# Patient Record
Sex: FEMALE | Race: BLACK OR AFRICAN AMERICAN | Employment: FULL TIME | URBAN - METROPOLITAN AREA
[De-identification: names, ages, dates, MRNs, and addresses within clinical notes are randomized per-mention and may not be internally consistent; named-entity substitution may affect disease eponyms.]

---

## 2017-06-19 ENCOUNTER — OFFICE VISIT (OUTPATIENT)
Dept: FAMILY MEDICINE CLINIC | Facility: CLINIC | Age: 42
End: 2017-06-19

## 2017-06-19 VITALS
HEART RATE: 68 BPM | HEIGHT: 65.5 IN | BODY MASS INDEX: 31.67 KG/M2 | SYSTOLIC BLOOD PRESSURE: 120 MMHG | DIASTOLIC BLOOD PRESSURE: 72 MMHG | WEIGHT: 192.38 LBS | TEMPERATURE: 98 F | RESPIRATION RATE: 12 BRPM

## 2017-06-19 DIAGNOSIS — Z13.1 SCREENING FOR DIABETES MELLITUS: ICD-10-CM

## 2017-06-19 DIAGNOSIS — Z12.31 ENCOUNTER FOR SCREENING MAMMOGRAM FOR BREAST CANCER: ICD-10-CM

## 2017-06-19 DIAGNOSIS — Z13.29 SCREENING FOR THYROID DISORDER: ICD-10-CM

## 2017-06-19 DIAGNOSIS — Z01.419 WELL WOMAN EXAM: Primary | ICD-10-CM

## 2017-06-19 DIAGNOSIS — Z13.220 SCREENING, LIPID: ICD-10-CM

## 2017-06-19 DIAGNOSIS — N83.201 RIGHT OVARIAN CYST: ICD-10-CM

## 2017-06-19 DIAGNOSIS — Z00.00 BLOOD TESTS FOR ROUTINE GENERAL PHYSICAL EXAMINATION: ICD-10-CM

## 2017-06-19 DIAGNOSIS — Z13.0 SCREENING, ANEMIA, DEFICIENCY, IRON: ICD-10-CM

## 2017-06-19 PROCEDURE — 90471 IMMUNIZATION ADMIN: CPT | Performed by: FAMILY MEDICINE

## 2017-06-19 PROCEDURE — 99396 PREV VISIT EST AGE 40-64: CPT | Performed by: FAMILY MEDICINE

## 2017-06-19 PROCEDURE — 90715 TDAP VACCINE 7 YRS/> IM: CPT | Performed by: FAMILY MEDICINE

## 2017-06-19 NOTE — PROGRESS NOTES
:HPI:   Isabelle Parsons is a 39year old female who presents for a complete physical exam.     No kidney stone pain. Avoiding hot foods. Watching diet-salads, grilled cheese. Splurges once a week.       Wt Readings from Last 3 Encounters:  06/19 Temp(Src) 97.7 °F (36.5 °C) (Oral)  Resp 12  Ht 65.5\"  Wt 192 lb 6.4 oz  BMI 31.52 kg/m2  Body mass index is 31.52 kg/(m^2). GENERAL: well developed, well nourished,in no apparent distress.   Mood, affect and behavior is normal.    SKIN: no rashes,no jose maria results found for: T4F      Lipids  (most recent labs)   No results found for: A1C       ASSESSMENT:     Well woman exam    PLAN:     Ifeoma Car was seen today for physical.    Diagnoses and all orders for this visit:    Well woman exam    Encounter for scre

## 2017-06-26 ENCOUNTER — LAB ENCOUNTER (OUTPATIENT)
Dept: LAB | Facility: HOSPITAL | Age: 42
End: 2017-06-26
Attending: FAMILY MEDICINE
Payer: COMMERCIAL

## 2017-06-26 ENCOUNTER — HOSPITAL ENCOUNTER (OUTPATIENT)
Dept: ULTRASOUND IMAGING | Facility: HOSPITAL | Age: 42
Discharge: HOME | End: 2017-06-26
Attending: FAMILY MEDICINE
Payer: COMMERCIAL

## 2017-06-26 DIAGNOSIS — Z00.00 BLOOD TESTS FOR ROUTINE GENERAL PHYSICAL EXAMINATION: ICD-10-CM

## 2017-06-26 DIAGNOSIS — Z13.29 SCREENING FOR THYROID DISORDER: ICD-10-CM

## 2017-06-26 DIAGNOSIS — N83.201 RIGHT OVARIAN CYST: ICD-10-CM

## 2017-06-26 DIAGNOSIS — Z13.0 SCREENING, ANEMIA, DEFICIENCY, IRON: ICD-10-CM

## 2017-06-26 DIAGNOSIS — Z13.1 SCREENING FOR DIABETES MELLITUS: ICD-10-CM

## 2017-06-26 PROCEDURE — 76856 US EXAM PELVIC COMPLETE: CPT | Performed by: FAMILY MEDICINE

## 2017-06-26 PROCEDURE — 76830 TRANSVAGINAL US NON-OB: CPT | Performed by: FAMILY MEDICINE

## 2017-06-26 PROCEDURE — 80053 COMPREHEN METABOLIC PANEL: CPT

## 2017-06-26 PROCEDURE — 84443 ASSAY THYROID STIM HORMONE: CPT

## 2017-06-26 PROCEDURE — 85025 COMPLETE CBC W/AUTO DIFF WBC: CPT

## 2017-06-26 PROCEDURE — 36415 COLL VENOUS BLD VENIPUNCTURE: CPT

## 2017-06-27 NOTE — PROGRESS NOTES
Discussed US of pelvis results with Coleman by phone, telling her the results were normal per [facility] [place]. Coleman verbalizes understanding.

## 2017-06-27 NOTE — PROGRESS NOTES
Discussed test results with Lester Mantilla by phone, telling her the labs are normal per [facility]. Patient verbalizes understanding.

## 2017-11-15 ENCOUNTER — OFFICE VISIT (OUTPATIENT)
Dept: FAMILY MEDICINE CLINIC | Facility: CLINIC | Age: 42
End: 2017-11-15

## 2017-11-15 VITALS
HEART RATE: 66 BPM | BODY MASS INDEX: 32.51 KG/M2 | WEIGHT: 197.5 LBS | TEMPERATURE: 98 F | SYSTOLIC BLOOD PRESSURE: 110 MMHG | RESPIRATION RATE: 18 BRPM | DIASTOLIC BLOOD PRESSURE: 72 MMHG | HEIGHT: 65.5 IN

## 2017-11-15 DIAGNOSIS — R14.0 SENSATION OF GASEOUS ABDOMINAL FULLNESS: Primary | ICD-10-CM

## 2017-11-15 DIAGNOSIS — Z23 NEED FOR VIRAL IMMUNIZATION: ICD-10-CM

## 2017-11-15 PROCEDURE — 99214 OFFICE O/P EST MOD 30 MIN: CPT | Performed by: FAMILY MEDICINE

## 2017-11-15 PROCEDURE — 90471 IMMUNIZATION ADMIN: CPT | Performed by: FAMILY MEDICINE

## 2017-11-15 PROCEDURE — 90686 IIV4 VACC NO PRSV 0.5 ML IM: CPT | Performed by: FAMILY MEDICINE

## 2017-11-15 NOTE — PATIENT INSTRUCTIONS
Take a daily probiotic (Diego's brand is fine!). Take calcium 1,000-1,200 daily. Take Benefiber once daily mixed in water.

## 2017-11-15 NOTE — PROGRESS NOTES
Facundo Whaley is a 43year old female. S:  Patient presents today with the following concerns:  · Eating healthy-raw veggies and smoothies.   Mixed greens, kale, spinach, onions, etc.  For 2 months feeling bloating, feels a \"movement\" in abd no edema  NEURO: Oriented times three,cranial nerves are intact,motor and sensory are grossly intact    ASSESSMENT AND PLAN:  Marshal Clifford is a 43year old female.   Sensation of gaseous abdominal fullness  (primary encounter diagnosis)  Need fo

## 2018-05-30 ENCOUNTER — OFFICE VISIT (OUTPATIENT)
Dept: FAMILY MEDICINE CLINIC | Facility: CLINIC | Age: 43
End: 2018-05-30

## 2018-05-30 VITALS
BODY MASS INDEX: 34.07 KG/M2 | DIASTOLIC BLOOD PRESSURE: 88 MMHG | WEIGHT: 207 LBS | SYSTOLIC BLOOD PRESSURE: 128 MMHG | HEIGHT: 65.5 IN | TEMPERATURE: 99 F | HEART RATE: 84 BPM | RESPIRATION RATE: 16 BRPM

## 2018-05-30 DIAGNOSIS — Z12.31 ENCOUNTER FOR SCREENING MAMMOGRAM FOR BREAST CANCER: ICD-10-CM

## 2018-05-30 DIAGNOSIS — Z00.00 LABORATORY EXAMINATION ORDERED AS PART OF A ROUTINE GENERAL MEDICAL EXAMINATION: ICD-10-CM

## 2018-05-30 DIAGNOSIS — Z13.21 ENCOUNTER FOR VITAMIN DEFICIENCY SCREENING: ICD-10-CM

## 2018-05-30 DIAGNOSIS — Z13.0 SCREENING, ANEMIA, DEFICIENCY, IRON: ICD-10-CM

## 2018-05-30 DIAGNOSIS — Z01.419 WELL WOMAN EXAM: Primary | ICD-10-CM

## 2018-05-30 PROCEDURE — 99396 PREV VISIT EST AGE 40-64: CPT | Performed by: FAMILY MEDICINE

## 2018-05-30 NOTE — PROGRESS NOTES
:HPI:   Nida Dumont is a 43year old female who presents for a complete physical exam.     Patient complains of weight gain. Wondering if related to IUD.     Wt Readings from Last 3 Encounters:  05/30/18 : 207 lb  11/15/17 : 197 lb 8 oz  06/19 Resp 16   Ht 65.5\"   Wt 207 lb   BMI 33.92 kg/m²   Body mass index is 33.92 kg/m². GENERAL: well developed, well nourished,in no apparent distress.   Mood, affect and behavior is normal.    SKIN: no rashes,no suspicious lesions  HEENT: atraumatic, norm (most recent labs)   No results found for: A1C       ASSESSMENT:     Well woman exam    PLAN:     Petra Bardales was seen today for physical and contraception.     Diagnoses and all orders for this visit:    Well woman exam    Encounter for screening mammogram fo

## 2018-06-26 ENCOUNTER — LAB ENCOUNTER (OUTPATIENT)
Dept: LAB | Age: 43
End: 2018-06-26
Attending: FAMILY MEDICINE
Payer: COMMERCIAL

## 2018-06-26 DIAGNOSIS — Z13.21 ENCOUNTER FOR VITAMIN DEFICIENCY SCREENING: ICD-10-CM

## 2018-06-26 DIAGNOSIS — Z00.00 LABORATORY EXAMINATION ORDERED AS PART OF A ROUTINE GENERAL MEDICAL EXAMINATION: ICD-10-CM

## 2018-06-26 DIAGNOSIS — Z13.0 SCREENING, ANEMIA, DEFICIENCY, IRON: ICD-10-CM

## 2018-06-26 PROCEDURE — 80061 LIPID PANEL: CPT

## 2018-06-26 PROCEDURE — 84443 ASSAY THYROID STIM HORMONE: CPT

## 2018-06-26 PROCEDURE — 80053 COMPREHEN METABOLIC PANEL: CPT

## 2018-06-26 PROCEDURE — 82306 VITAMIN D 25 HYDROXY: CPT

## 2018-06-26 PROCEDURE — 85025 COMPLETE CBC W/AUTO DIFF WBC: CPT

## 2018-06-27 DIAGNOSIS — R79.89 LOW VITAMIN D LEVEL: ICD-10-CM

## 2018-06-27 DIAGNOSIS — R79.89 ELEVATED SERUM CREATININE: ICD-10-CM

## 2018-06-27 DIAGNOSIS — D69.6 PLATELETS DECREASED (HCC): Primary | ICD-10-CM

## 2018-08-13 ENCOUNTER — OFFICE VISIT (OUTPATIENT)
Dept: FAMILY MEDICINE CLINIC | Facility: CLINIC | Age: 43
End: 2018-08-13

## 2018-08-13 VITALS
HEART RATE: 60 BPM | SYSTOLIC BLOOD PRESSURE: 118 MMHG | TEMPERATURE: 97 F | RESPIRATION RATE: 16 BRPM | DIASTOLIC BLOOD PRESSURE: 78 MMHG

## 2018-08-13 DIAGNOSIS — B37.3 YEAST VAGINITIS: ICD-10-CM

## 2018-08-13 DIAGNOSIS — M54.32 SCIATICA OF LEFT SIDE: Primary | ICD-10-CM

## 2018-08-13 PROCEDURE — 99213 OFFICE O/P EST LOW 20 MIN: CPT | Performed by: FAMILY MEDICINE

## 2018-08-13 RX ORDER — FLUCONAZOLE 150 MG/1
150 TABLET ORAL ONCE
Qty: 2 TABLET | Refills: 2 | Status: SHIPPED | OUTPATIENT
Start: 2018-08-13 | End: 2018-08-13

## 2018-08-13 RX ORDER — MELOXICAM 15 MG/1
15 TABLET ORAL DAILY
Qty: 30 TABLET | Refills: 0 | Status: SHIPPED | OUTPATIENT
Start: 2018-08-13 | End: 2019-06-11 | Stop reason: ALTCHOICE

## 2018-08-13 NOTE — PATIENT INSTRUCTIONS
Sciatica    Sciatica is a condition that causes pain in the lower back that spreads down into the buttock, hip, and leg. Sometimes the leg pain can happen without any back pain.  Sciatica happens when a spinal nerve is irritated or has pressure put on it · When in bed, try to find a position that is comfortable. A firm mattress is best. Try lying flat on your back with pillows under your knees. You can also try lying on your side with your knees bent up toward your chest and a pillow between your knees.   · © 9615-6872 [employer] [address]. [address] [facility], [facility] [place]. All rights reserved. This information is not intended as a substitute for professional medical care. Always follow your healthcare professional's instructions.

## 2018-08-13 NOTE — PROGRESS NOTES
Kaela Agustin is a 43year old female. S:  Patient presents today with the following concerns:  · Numbness/tingling from left hip to left knee-down side. No back pain. Uses hot compresses when gets very bad. Sits all day for job.   Standing intact    ASSESSMENT AND PLAN:  Liliane Gonzalez is a 43year old female. Sciatica of left side  (primary encounter diagnosis)  Yeast vaginitis    No orders of the defined types were placed in this encounter.     Meds & Refills for this Visit:  Edis Leblanc

## 2018-09-17 ENCOUNTER — OFFICE VISIT (OUTPATIENT)
Dept: FAMILY MEDICINE CLINIC | Facility: CLINIC | Age: 43
End: 2018-09-17

## 2018-09-17 VITALS
HEART RATE: 64 BPM | RESPIRATION RATE: 16 BRPM | HEIGHT: 65.75 IN | BODY MASS INDEX: 33.83 KG/M2 | WEIGHT: 208 LBS | SYSTOLIC BLOOD PRESSURE: 124 MMHG | DIASTOLIC BLOOD PRESSURE: 84 MMHG | TEMPERATURE: 98 F

## 2018-09-17 DIAGNOSIS — N76.0 BACTERIAL VAGINITIS: Primary | ICD-10-CM

## 2018-09-17 DIAGNOSIS — B96.89 BACTERIAL VAGINITIS: Primary | ICD-10-CM

## 2018-09-17 PROCEDURE — 99213 OFFICE O/P EST LOW 20 MIN: CPT | Performed by: FAMILY MEDICINE

## 2018-09-17 PROCEDURE — 87510 GARDNER VAG DNA DIR PROBE: CPT | Performed by: FAMILY MEDICINE

## 2018-09-17 PROCEDURE — 87660 TRICHOMONAS VAGIN DIR PROBE: CPT | Performed by: FAMILY MEDICINE

## 2018-09-17 PROCEDURE — 90471 IMMUNIZATION ADMIN: CPT | Performed by: FAMILY MEDICINE

## 2018-09-17 PROCEDURE — 87480 CANDIDA DNA DIR PROBE: CPT | Performed by: FAMILY MEDICINE

## 2018-09-17 PROCEDURE — 90686 IIV4 VACC NO PRSV 0.5 ML IM: CPT | Performed by: FAMILY MEDICINE

## 2018-09-17 RX ORDER — METRONIDAZOLE 500 MG/1
500 TABLET ORAL 2 TIMES DAILY
Qty: 14 TABLET | Refills: 0 | Status: SHIPPED | OUTPATIENT
Start: 2018-09-17 | End: 2019-06-11 | Stop reason: ALTCHOICE

## 2018-09-17 NOTE — PROGRESS NOTES
Hosea Redding is a 43year old female. S:  Patient presents today with the following concerns:  · Vaginal discharge. Metallic smell. Profuse discharge. Slight outer labial itching. Sometimes ache in the pubic bone. · No fevers, N/V/D. AND PLAN:  Vicky Cannon is a 43year old female.   Bacterial vaginitis  (primary encounter diagnosis)    Orders Placed This Encounter      Flulaval 6 months and older 0.5 ml Quad PF [02812]      Vaginitis/Vaginosis, Dna Probe    Meds & Refills f

## 2018-09-17 NOTE — PATIENT INSTRUCTIONS
Bacterial Vaginosis    You have a vaginal infection called bacterial vaginosis (BV). Both good and bad bacteria are present in a healthy vagina. BV occurs when these bacteria get out of balance. The number of bad bacteria increase.  And the number of good · You have a fever of 100.4ºF (38ºC) or higher, or as directed by your provider. · Your symptoms worsen, or they don’t go away within a few days of starting treatment. · You have new pain in the lower belly or pelvic region.   · You have side effects that · Avoid vaginal sprays, scented toilet paper and soaps, and deodorant tampons or pads, which can cause vaginal irritation  Staying healthy overall  Good overall health can help you resist infection.  To be healthier:  · Help protect yourself from STIs (sexu

## 2019-03-05 ENCOUNTER — HOSPITAL ENCOUNTER (OUTPATIENT)
Dept: MAMMOGRAPHY | Age: 44
Discharge: HOME | End: 2019-03-05
Attending: FAMILY MEDICINE
Payer: COMMERCIAL

## 2019-03-05 DIAGNOSIS — Z12.31 ENCOUNTER FOR SCREENING MAMMOGRAM FOR BREAST CANCER: ICD-10-CM

## 2019-03-05 PROCEDURE — 77067 SCR MAMMO BI INCL CAD: CPT | Performed by: FAMILY MEDICINE

## 2019-03-05 PROCEDURE — 77063 BREAST TOMOSYNTHESIS BI: CPT | Performed by: FAMILY MEDICINE

## 2019-03-12 ENCOUNTER — OFFICE VISIT (OUTPATIENT)
Dept: FAMILY MEDICINE CLINIC | Facility: CLINIC | Age: 44
End: 2019-03-12

## 2019-03-12 VITALS
WEIGHT: 204 LBS | DIASTOLIC BLOOD PRESSURE: 68 MMHG | BODY MASS INDEX: 33 KG/M2 | TEMPERATURE: 99 F | HEART RATE: 79 BPM | RESPIRATION RATE: 22 BRPM | SYSTOLIC BLOOD PRESSURE: 122 MMHG | OXYGEN SATURATION: 100 %

## 2019-03-12 DIAGNOSIS — B34.9 ACUTE VIRAL SYNDROME: Primary | ICD-10-CM

## 2019-03-12 PROCEDURE — 99213 OFFICE O/P EST LOW 20 MIN: CPT | Performed by: FAMILY MEDICINE

## 2019-03-12 NOTE — PROGRESS NOTES
CHIEF COMPLAINT: Patient presents with: Body ache and/or chills: x1 day  Fever  Cough        HPI:     Raegan Hickman is a 37year old female presents for body aches and cough x 2 days. Pt p/w a 2-day history of bodyaches and cough.  She starte fatigue. Negative for fever. HENT: Positive for congestion, rhinorrhea and sore throat. Negative for ear pain, postnasal drip and sinus pressure. Respiratory: Positive for cough. Negative for shortness of breath and wheezing.     Gastrointestinal: Nega Acute viral syndrome  Reassured pt this is likely a viral syndrome and will improve in the next 7-10 days. Influenza not suspected, given she has not had a fever.  Supportive care d/w pt: OTC analgesics/antipyretics prn, rest, fluids, OTC cold and flu meds

## 2019-03-12 NOTE — PATIENT INSTRUCTIONS
Viral Syndrome (Adult)  A viral illness may cause a number of symptoms such as fever. Other symptoms depend on the part of the body that the virus affects.  If it settles in your nose, throat, and lungs, it may cause cough, sore throat, congestion, runny · Your appetite may be poor, so a light diet is fine. Avoid dehydration by drinking 8 to 12, 8-ounce glasses of fluids each day.  This may include water; orange juice; lemonade; apple, grape, and cranberry juice; clear fruit drinks; electrolyte replacement © 9313-6595 [employer] [address]. 1407 [facility] [place] [place]. All rights reserved. This information is not intended as a substitute for professional medical care. Always follow your healthcare professional's instructions.

## 2019-06-11 ENCOUNTER — OFFICE VISIT (OUTPATIENT)
Dept: FAMILY MEDICINE CLINIC | Facility: CLINIC | Age: 44
End: 2019-06-11

## 2019-06-11 VITALS
TEMPERATURE: 97 F | WEIGHT: 211.13 LBS | SYSTOLIC BLOOD PRESSURE: 124 MMHG | BODY MASS INDEX: 34.75 KG/M2 | DIASTOLIC BLOOD PRESSURE: 68 MMHG | RESPIRATION RATE: 16 BRPM | HEIGHT: 65.5 IN | HEART RATE: 72 BPM

## 2019-06-11 DIAGNOSIS — Z00.00 WELL ADULT EXAM: Primary | ICD-10-CM

## 2019-06-11 DIAGNOSIS — Z00.00 BLOOD TESTS FOR ROUTINE GENERAL PHYSICAL EXAMINATION: ICD-10-CM

## 2019-06-11 DIAGNOSIS — Z12.4 SCREENING FOR CERVICAL CANCER: ICD-10-CM

## 2019-06-11 DIAGNOSIS — Z11.51 SCREENING FOR HUMAN PAPILLOMAVIRUS (HPV): ICD-10-CM

## 2019-06-11 PROCEDURE — 88175 CYTOPATH C/V AUTO FLUID REDO: CPT | Performed by: FAMILY MEDICINE

## 2019-06-11 PROCEDURE — 99396 PREV VISIT EST AGE 40-64: CPT | Performed by: FAMILY MEDICINE

## 2019-06-11 PROCEDURE — 87624 HPV HI-RISK TYP POOLED RSLT: CPT | Performed by: FAMILY MEDICINE

## 2019-06-11 RX ORDER — FLUCONAZOLE 150 MG/1
150 TABLET ORAL ONCE
Qty: 2 TABLET | Refills: 1 | Status: SHIPPED | OUTPATIENT
Start: 2019-06-11 | End: 2020-03-11

## 2019-06-11 NOTE — PROGRESS NOTES
:HPI:   Vicky Cannon is a 37year old female who presents for a complete physical exam.     Patient has no complaints.     Wt Readings from Last 3 Encounters:  06/11/19 : 211 lb 1.9 oz  03/12/19 : 204 lb  09/17/18 : 208 lb    Body mass index is /68   Pulse 72   Temp 96.8 °F (36 °C)   Resp 16   Ht 65.5\"   Wt 211 lb 1.9 oz   BMI 34.60 kg/m²   Body mass index is 34.6 kg/m². GENERAL: well developed, well nourished,in no apparent distress.   Mood, affect and behavior is normal.    SKIN: no r 06/26/2018 09:44 AM    HDL 48 06/26/2018 09:44 AM    LDL 75 06/26/2018 09:44 AM    NONHDLC 88 06/26/2018 09:44 AM          Thyroid  (most recent labs)   No results found for: T4F      Lipids  (most recent labs)   No results found for: A1C       ASSESSMENT:

## 2019-07-23 ENCOUNTER — LAB ENCOUNTER (OUTPATIENT)
Dept: LAB | Age: 44
End: 2019-07-23
Attending: FAMILY MEDICINE
Payer: COMMERCIAL

## 2019-07-23 DIAGNOSIS — Z00.00 BLOOD TESTS FOR ROUTINE GENERAL PHYSICAL EXAMINATION: ICD-10-CM

## 2019-07-23 LAB
ALBUMIN SERPL-MCNC: 4 G/DL (ref 3.4–5)
ALBUMIN/GLOB SERPL: 1.3 {RATIO} (ref 1–2)
ALP LIVER SERPL-CCNC: 90 U/L (ref 37–98)
ALT SERPL-CCNC: 42 U/L (ref 13–56)
ANION GAP SERPL CALC-SCNC: 6 MMOL/L (ref 0–18)
AST SERPL-CCNC: 19 U/L (ref 15–37)
BASOPHILS # BLD AUTO: 0.05 X10(3) UL (ref 0–0.2)
BASOPHILS NFR BLD AUTO: 0.8 %
BILIRUB SERPL-MCNC: 1 MG/DL (ref 0.1–2)
BUN BLD-MCNC: 15 MG/DL (ref 7–18)
BUN/CREAT SERPL: 16 (ref 10–20)
CALCIUM BLD-MCNC: 9.4 MG/DL (ref 8.5–10.1)
CHLORIDE SERPL-SCNC: 108 MMOL/L (ref 98–112)
CO2 SERPL-SCNC: 28 MMOL/L (ref 21–32)
CREAT BLD-MCNC: 0.94 MG/DL (ref 0.55–1.02)
DEPRECATED RDW RBC AUTO: 41.9 FL (ref 35.1–46.3)
EOSINOPHIL # BLD AUTO: 0.21 X10(3) UL (ref 0–0.7)
EOSINOPHIL NFR BLD AUTO: 3.2 %
ERYTHROCYTE [DISTWIDTH] IN BLOOD BY AUTOMATED COUNT: 13.7 % (ref 11–15)
GLOBULIN PLAS-MCNC: 3.2 G/DL (ref 2.8–4.4)
GLUCOSE BLD-MCNC: 91 MG/DL (ref 70–99)
HCT VFR BLD AUTO: 42.8 % (ref 35–48)
HGB BLD-MCNC: 13.3 G/DL (ref 12–16)
IMM GRANULOCYTES # BLD AUTO: 0.01 X10(3) UL (ref 0–1)
IMM GRANULOCYTES NFR BLD: 0.2 %
LYMPHOCYTES # BLD AUTO: 1.64 X10(3) UL (ref 1–4)
LYMPHOCYTES NFR BLD AUTO: 24.8 %
M PROTEIN MFR SERPL ELPH: 7.2 G/DL (ref 6.4–8.2)
MCH RBC QN AUTO: 26 PG (ref 26–34)
MCHC RBC AUTO-ENTMCNC: 31.1 G/DL (ref 31–37)
MCV RBC AUTO: 83.8 FL (ref 80–100)
MONOCYTES # BLD AUTO: 0.5 X10(3) UL (ref 0.1–1)
MONOCYTES NFR BLD AUTO: 7.6 %
NEUTROPHILS # BLD AUTO: 4.2 X10 (3) UL (ref 1.5–7.7)
NEUTROPHILS # BLD AUTO: 4.2 X10(3) UL (ref 1.5–7.7)
NEUTROPHILS NFR BLD AUTO: 63.4 %
OSMOLALITY SERPL CALC.SUM OF ELEC: 294 MOSM/KG (ref 275–295)
PLATELET # BLD AUTO: 171 10(3)UL (ref 150–450)
POTASSIUM SERPL-SCNC: 4.9 MMOL/L (ref 3.5–5.1)
RBC # BLD AUTO: 5.11 X10(6)UL (ref 3.8–5.3)
SODIUM SERPL-SCNC: 142 MMOL/L (ref 136–145)
VIT D+METAB SERPL-MCNC: 33.3 NG/ML (ref 30–100)
WBC # BLD AUTO: 6.6 X10(3) UL (ref 4–11)

## 2019-07-23 PROCEDURE — 82306 VITAMIN D 25 HYDROXY: CPT

## 2019-07-23 PROCEDURE — 85025 COMPLETE CBC W/AUTO DIFF WBC: CPT

## 2019-07-23 PROCEDURE — 80053 COMPREHEN METABOLIC PANEL: CPT

## 2019-08-26 ENCOUNTER — OFFICE VISIT (OUTPATIENT)
Dept: FAMILY MEDICINE CLINIC | Facility: CLINIC | Age: 44
End: 2019-08-26

## 2019-08-26 ENCOUNTER — LAB ENCOUNTER (OUTPATIENT)
Dept: LAB | Age: 44
End: 2019-08-26
Attending: FAMILY MEDICINE
Payer: COMMERCIAL

## 2019-08-26 VITALS
HEIGHT: 65.5 IN | OXYGEN SATURATION: 99 % | SYSTOLIC BLOOD PRESSURE: 124 MMHG | DIASTOLIC BLOOD PRESSURE: 88 MMHG | WEIGHT: 210.38 LBS | HEART RATE: 71 BPM | RESPIRATION RATE: 16 BRPM | BODY MASS INDEX: 34.63 KG/M2

## 2019-08-26 DIAGNOSIS — Z11.3 SCREENING FOR STD (SEXUALLY TRANSMITTED DISEASE): ICD-10-CM

## 2019-08-26 DIAGNOSIS — N76.0 ACUTE VAGINITIS: ICD-10-CM

## 2019-08-26 DIAGNOSIS — R30.9 PAINFUL URINATION: Primary | ICD-10-CM

## 2019-08-26 DIAGNOSIS — B37.3 YEAST VAGINITIS: ICD-10-CM

## 2019-08-26 LAB
BILIRUBIN: NEGATIVE
GLUCOSE (URINE DIPSTICK): NEGATIVE MG/DL
KETONES (URINE DIPSTICK): NEGATIVE MG/DL
MULTISTIX LOT#: ABNORMAL NUMERIC
NITRITE, URINE: NEGATIVE
PH, URINE: 6.5 (ref 4.5–8)
SPECIFIC GRAVITY: >1.03 (ref 1–1.03)
T PALLIDUM AB SER QL IA: NONREACTIVE
URINE-COLOR: YELLOW
UROBILINOGEN,SEMI-QN: 1 MG/DL (ref 0–1.9)

## 2019-08-26 PROCEDURE — 86780 TREPONEMA PALLIDUM: CPT

## 2019-08-26 PROCEDURE — 87086 URINE CULTURE/COLONY COUNT: CPT | Performed by: FAMILY MEDICINE

## 2019-08-26 PROCEDURE — 87510 GARDNER VAG DNA DIR PROBE: CPT | Performed by: FAMILY MEDICINE

## 2019-08-26 PROCEDURE — 87491 CHLMYD TRACH DNA AMP PROBE: CPT | Performed by: FAMILY MEDICINE

## 2019-08-26 PROCEDURE — 87480 CANDIDA DNA DIR PROBE: CPT | Performed by: FAMILY MEDICINE

## 2019-08-26 PROCEDURE — 87077 CULTURE AEROBIC IDENTIFY: CPT | Performed by: FAMILY MEDICINE

## 2019-08-26 PROCEDURE — 99214 OFFICE O/P EST MOD 30 MIN: CPT | Performed by: FAMILY MEDICINE

## 2019-08-26 PROCEDURE — 81003 URINALYSIS AUTO W/O SCOPE: CPT | Performed by: FAMILY MEDICINE

## 2019-08-26 PROCEDURE — 87591 N.GONORRHOEAE DNA AMP PROB: CPT | Performed by: FAMILY MEDICINE

## 2019-08-26 PROCEDURE — 87389 HIV-1 AG W/HIV-1&-2 AB AG IA: CPT

## 2019-08-26 PROCEDURE — 87660 TRICHOMONAS VAGIN DIR PROBE: CPT | Performed by: FAMILY MEDICINE

## 2019-08-26 RX ORDER — FLUCONAZOLE 150 MG/1
150 TABLET ORAL ONCE
Qty: 6 TABLET | Refills: 1 | Status: SHIPPED | OUTPATIENT
Start: 2019-08-26 | End: 2020-03-12

## 2019-08-26 NOTE — PATIENT INSTRUCTIONS
Preventing Vaginal Infection  These steps can help you stay comfortable during treatment of a vaginal infection. They also help prevent vaginal infections in the future.   Keeping a healthy balance  Factors that change the normal balance in the vagina can © 1785-4724 [employer] [address]. [address] [facility] [place], [address] [place]. All rights reserved. This information is not intended as a substitute for professional medical care. Always follow your healthcare professional's instructions.         Vaginal · Trichomoniasis (“trich”). Claudy Burgess is a parasite. It is passed from one person to another during sex. Men with trich often don’t have any symptoms. In women, it can take weeks or months before symptoms appear.   Date Last Reviewed: 3/1/2017  © 4810-4517 The

## 2019-08-27 LAB
C TRACH DNA SPEC QL NAA+PROBE: NEGATIVE
N GONORRHOEA DNA SPEC QL NAA+PROBE: NEGATIVE

## 2019-08-27 RX ORDER — METRONIDAZOLE 500 MG/1
500 TABLET ORAL 2 TIMES DAILY
Qty: 14 TABLET | Refills: 0 | Status: SHIPPED | OUTPATIENT
Start: 2019-08-27 | End: 2020-10-08

## 2019-08-27 NOTE — PROGRESS NOTES
Hillary Patten is a 37year old female. S:  Patient presents today with the following concerns:  Chief Complaint    Yeast Infection (New partner on 8/6/19 without condoms. Pt state that she noticed symptoms on 8/10/19.  Took RX and went away unt BACK:  No CVA tenderness to percussion. :  External genitalia:  Left vaginal introitus there is a healing area that appears to be abrasion. Thick creamy colored discharge present in vagina. Mild erythema of vaginal walls.   Cervix is normal.  IUD strin

## 2019-08-28 ENCOUNTER — TELEPHONE (OUTPATIENT)
Dept: FAMILY MEDICINE CLINIC | Facility: CLINIC | Age: 44
End: 2019-08-28

## 2019-08-28 DIAGNOSIS — N39.0 URINARY TRACT INFECTION WITHOUT HEMATURIA, SITE UNSPECIFIED: Primary | ICD-10-CM

## 2019-08-28 RX ORDER — AMOXICILLIN 875 MG/1
875 TABLET, COATED ORAL 2 TIMES DAILY
Qty: 14 TABLET | Refills: 0 | Status: SHIPPED | OUTPATIENT
Start: 2019-08-28 | End: 2020-02-24 | Stop reason: ALTCHOICE

## 2019-08-28 NOTE — TELEPHONE ENCOUNTER
Notes recorded by Derik Dixon PA-C on 8/28/2019 at 4:07 PM CDT  Culture with group B strep.  Let's treat with Amoxil 875 mg BID for 7 days. Murali Mosquera to order. Noe Curling.  ------

## 2019-10-30 ENCOUNTER — IMMUNIZATION (OUTPATIENT)
Dept: FAMILY MEDICINE CLINIC | Facility: CLINIC | Age: 44
End: 2019-10-30

## 2019-10-30 DIAGNOSIS — Z23 NEED FOR VACCINATION: ICD-10-CM

## 2019-10-30 PROCEDURE — 90471 IMMUNIZATION ADMIN: CPT | Performed by: FAMILY MEDICINE

## 2019-10-30 PROCEDURE — 90686 IIV4 VACC NO PRSV 0.5 ML IM: CPT | Performed by: FAMILY MEDICINE

## 2020-02-24 ENCOUNTER — OFFICE VISIT (OUTPATIENT)
Dept: FAMILY MEDICINE CLINIC | Facility: CLINIC | Age: 45
End: 2020-02-24

## 2020-02-24 ENCOUNTER — HOSPITAL ENCOUNTER (OUTPATIENT)
Dept: GENERAL RADIOLOGY | Facility: HOSPITAL | Age: 45
Discharge: HOME | End: 2020-02-24
Attending: NURSE PRACTITIONER
Payer: COMMERCIAL

## 2020-02-24 VITALS
WEIGHT: 194.5 LBS | TEMPERATURE: 98 F | HEIGHT: 65.5 IN | RESPIRATION RATE: 16 BRPM | HEART RATE: 64 BPM | BODY MASS INDEX: 32.01 KG/M2 | DIASTOLIC BLOOD PRESSURE: 68 MMHG | SYSTOLIC BLOOD PRESSURE: 110 MMHG

## 2020-02-24 DIAGNOSIS — M25.561 ACUTE PAIN OF RIGHT KNEE: ICD-10-CM

## 2020-02-24 DIAGNOSIS — M25.561 ACUTE PAIN OF RIGHT KNEE: Primary | ICD-10-CM

## 2020-02-24 PROCEDURE — 73560 X-RAY EXAM OF KNEE 1 OR 2: CPT | Performed by: NURSE PRACTITIONER

## 2020-02-24 PROCEDURE — 99213 OFFICE O/P EST LOW 20 MIN: CPT | Performed by: NURSE PRACTITIONER

## 2020-02-24 RX ORDER — NAPROXEN 500 MG/1
500 TABLET ORAL 2 TIMES DAILY WITH MEALS
Qty: 14 TABLET | Refills: 0 | Status: SHIPPED | OUTPATIENT
Start: 2020-02-24 | End: 2021-02-19 | Stop reason: ALTCHOICE

## 2020-02-24 NOTE — PATIENT INSTRUCTIONS
Wear over the counter knee brace as shown in office. Wear this for when up and around, being active. Do not wear for more than 8-10 hours per day. Take Naproxen 1 tab, twice daily, until all tabs are gone.  Space doses 12 hours apart

## 2020-02-24 NOTE — PROGRESS NOTES
Patient presents with:  Knee Pain: x 2 weeks, hurts to stand and to sit      HPI:  Presents with approx 2 week history of right knee pain. Stated pain started after she banged knee on furniture in public restroom.  Pain is worse with walking/weight bearing rales  MS: right knee w/o edema, erythema, masses or obvious deformity. Mild point tenderness to tibial tuberosity region. ROM right knee WNL w/o clicking, popping or crepitus. No laxity. Skin: Skin is warm and dry. No rash noted. No erythema. No pallor.

## 2020-03-05 ENCOUNTER — TELEPHONE (OUTPATIENT)
Dept: FAMILY MEDICINE CLINIC | Facility: CLINIC | Age: 45
End: 2020-03-05

## 2020-03-05 NOTE — TELEPHONE ENCOUNTER
Patient is calling she is having severe stomach pain on the left side. She has been taking Naproxen and is concerned if that was the cause or if it is something else.

## 2020-03-05 NOTE — TELEPHONE ENCOUNTER
Talked to patient she has stopped the naproxen but has a stomach upset suggested she take mylanta or maalox and if not better tomorrow needs to be seen she will do this.

## 2020-03-08 DIAGNOSIS — B37.3 YEAST VAGINITIS: ICD-10-CM

## 2020-03-11 NOTE — TELEPHONE ENCOUNTER
Refill request for:    Requested Prescriptions     Pending Prescriptions Disp Refills   • FLUCONAZOLE 150 MG Oral Tab [Pharmacy Med Name: FLUCONAZOLE 150MG TABLETS] 6 tablet 1     Sig: TAKE 1 TABLET BY MOUTH ONCE FOR 1 DOSE. MAY REPEAT IN 3 DAYS AS NEEDED

## 2020-03-12 RX ORDER — FLUCONAZOLE 150 MG/1
TABLET ORAL
Qty: 6 TABLET | Refills: 1 | Status: SHIPPED | OUTPATIENT
Start: 2020-03-12 | End: 2020-10-08

## 2020-09-15 ENCOUNTER — TELEPHONE (OUTPATIENT)
Dept: FAMILY MEDICINE CLINIC | Facility: CLINIC | Age: 45
End: 2020-09-15

## 2020-09-15 NOTE — TELEPHONE ENCOUNTER
Patient had called asking for a referral to a chiropractor who was not in her network.  Explained that in order to get a chiropractic referral approved we would have to see her first to get office notes into the referral request.   Patient voiced understand

## 2020-09-16 ENCOUNTER — OFFICE VISIT (OUTPATIENT)
Dept: FAMILY MEDICINE CLINIC | Facility: CLINIC | Age: 45
End: 2020-09-16

## 2020-09-16 VITALS
HEART RATE: 72 BPM | DIASTOLIC BLOOD PRESSURE: 80 MMHG | WEIGHT: 178.5 LBS | RESPIRATION RATE: 16 BRPM | HEIGHT: 65.5 IN | BODY MASS INDEX: 29.38 KG/M2 | SYSTOLIC BLOOD PRESSURE: 118 MMHG | TEMPERATURE: 98 F

## 2020-09-16 DIAGNOSIS — M54.41 ACUTE MIDLINE LOW BACK PAIN WITH RIGHT-SIDED SCIATICA: Primary | ICD-10-CM

## 2020-09-16 PROCEDURE — 3079F DIAST BP 80-89 MM HG: CPT | Performed by: NURSE PRACTITIONER

## 2020-09-16 PROCEDURE — 99213 OFFICE O/P EST LOW 20 MIN: CPT | Performed by: NURSE PRACTITIONER

## 2020-09-16 PROCEDURE — 3074F SYST BP LT 130 MM HG: CPT | Performed by: NURSE PRACTITIONER

## 2020-09-16 PROCEDURE — 3008F BODY MASS INDEX DOCD: CPT | Performed by: NURSE PRACTITIONER

## 2020-09-16 RX ORDER — NAPROXEN 500 MG/1
500 TABLET ORAL 2 TIMES DAILY WITH MEALS
Qty: 14 TABLET | Refills: 0 | Status: SHIPPED | OUTPATIENT
Start: 2020-09-16 | End: 2020-10-08

## 2020-09-16 RX ORDER — CYCLOBENZAPRINE HCL 10 MG
10 TABLET ORAL NIGHTLY PRN
Qty: 10 TABLET | Refills: 0 | Status: SHIPPED | OUTPATIENT
Start: 2020-09-16 | End: 2020-10-20

## 2020-09-16 NOTE — PROGRESS NOTES
Patient presents with:  Low Back Pain: injured back while working out a few weeks ago       HPI:  Presents with approx 2 week history of low back pain that started after doing a kettle bell workout. Stated pain is worse with lying down or sitting.  Stated p nourished,in no apparent distress  HEENT: Normocephalic and atraumatic. Cardiovascular: Normal rate, regular rhythm. No murmur. Pulmonary/Chest: No respiratory distress. Effort normal. Breath sounds clear bilaterally.  No wheezes, rhonchi or rales  MS: to 3 times daily. If you have extra-strength tylenol you may take 2 tabs up to 3 times daily. Take one cyclobenzaprine tab nightly, before bed, for three (3) nights. Then may take nightly as needed. This medication will likely make you drowsy.  Do not

## 2020-09-18 ENCOUNTER — TELEPHONE (OUTPATIENT)
Dept: FAMILY MEDICINE CLINIC | Facility: CLINIC | Age: 45
End: 2020-09-18

## 2020-09-21 ENCOUNTER — IMMUNIZATION (OUTPATIENT)
Dept: FAMILY MEDICINE CLINIC | Facility: CLINIC | Age: 45
End: 2020-09-21

## 2020-09-21 DIAGNOSIS — Z23 NEED FOR VACCINATION: ICD-10-CM

## 2020-09-21 PROCEDURE — 90471 IMMUNIZATION ADMIN: CPT | Performed by: FAMILY MEDICINE

## 2020-09-21 PROCEDURE — 90686 IIV4 VACC NO PRSV 0.5 ML IM: CPT | Performed by: FAMILY MEDICINE

## 2020-09-23 ENCOUNTER — TELEPHONE (OUTPATIENT)
Dept: PHYSICAL THERAPY | Age: 45
End: 2020-09-23

## 2020-09-28 ENCOUNTER — OFFICE VISIT (OUTPATIENT)
Dept: PHYSICAL THERAPY | Age: 45
End: 2020-09-28
Attending: NURSE PRACTITIONER
Payer: COMMERCIAL

## 2020-09-28 DIAGNOSIS — M54.41 ACUTE MIDLINE LOW BACK PAIN WITH RIGHT-SIDED SCIATICA: ICD-10-CM

## 2020-09-28 PROCEDURE — 97161 PT EVAL LOW COMPLEX 20 MIN: CPT

## 2020-09-28 PROCEDURE — 97110 THERAPEUTIC EXERCISES: CPT

## 2020-09-28 NOTE — PROGRESS NOTES
SPINE EVALUATION:   Referring Physician: Dr. Graciela Bowers  Diagnosis: discogenic low back pain with radicular symptoms R>L     Date of Service: 9/28/2020     PATIENT SUMMARY   Kaela Agustin is a 40year old female who presents to therapy today with com back brace anymore. Her doctor told her not to. Has not used it at all in the last week. CLOF: Lately her workouts have been supported bench workouts with upper body- 15#.  She do some squats 45 deg- normally she can do them fully and loaded with more daniel are unremarkable. Functional deficits include but are not limited to bending, sitting prolonged, lifting, impact activities, coughing/laughing. Signs and symptoms are consistent with diagnosis of discogenic low back pain with radicular symptoms R>L.  Pt a prone on elbows position: relief of sciatic nerve pain  Standing extension repeated: did not produce such relief *pain in posterior thigh   Lumbar Quadrant Testing: all painful for sciatic nerve- equally   Slump: TBA  SLR: R 80, L 80  SLR with DF DI: R: 68 strength to be able to perform lifting weights in gym with <3/10 pain   · Pt will be independent and compliant with comprehensive HEP to maintain progress achieved in PT     Frequency / Duration: Patient will be seen for 1-2 x/week or a total of 8 visits o

## 2020-10-01 ENCOUNTER — OFFICE VISIT (OUTPATIENT)
Dept: PHYSICAL THERAPY | Age: 45
End: 2020-10-01
Attending: NURSE PRACTITIONER
Payer: COMMERCIAL

## 2020-10-01 PROCEDURE — 97110 THERAPEUTIC EXERCISES: CPT

## 2020-10-01 PROCEDURE — 97140 MANUAL THERAPY 1/> REGIONS: CPT

## 2020-10-01 NOTE — PROGRESS NOTES
Dx: discogenic low back pain with radicular symptoms R>L         Insurance (Authorized # of Visits):   [facility] 8 visits           Authorizing Physician: Dr. Jes Gandhi  Next MD visit: none scheduled  Fall Risk: standard         Precautions: n/a             Subjective Prone position deferred  Supine knee to chest single leg 10 sec hold x 10 reps R/L  Supine double knee to chest 30 sec hold x 2 sets   Seated flexion stretch 30 sec hold x 3 sets   Seated nerve slider 10 reps x 2 sets   Supine posterior pelvic tilts 10 s

## 2020-10-06 ENCOUNTER — OFFICE VISIT (OUTPATIENT)
Dept: PHYSICAL THERAPY | Age: 45
End: 2020-10-06
Attending: NURSE PRACTITIONER
Payer: COMMERCIAL

## 2020-10-06 PROCEDURE — 97110 THERAPEUTIC EXERCISES: CPT

## 2020-10-06 NOTE — PROGRESS NOTES
Dx: discogenic low back pain with radicular symptoms R>L         Insurance (Authorized # of Visits):   [facility] 8 visits           Authorizing Physician: Dr. Jessica Diaz  Next MD visit: none scheduled  Fall Risk: standard         Precautions: n/a             Subjective TX#: 5/ Date:    Tx#: 6/   Warm up: walking TM 2.5 mph- Pain deferred at 4 min  -       There ex:   Prone position deferred  Supine knee to chest single leg 10 sec hold x 10 reps R/L  Supine double knee to chest 30 sec hold x 2 sets   Seated flexion

## 2020-10-08 ENCOUNTER — LAB ENCOUNTER (OUTPATIENT)
Dept: LAB | Age: 45
End: 2020-10-08
Attending: FAMILY MEDICINE
Payer: COMMERCIAL

## 2020-10-08 ENCOUNTER — OFFICE VISIT (OUTPATIENT)
Dept: PHYSICAL THERAPY | Age: 45
End: 2020-10-08
Attending: NURSE PRACTITIONER
Payer: COMMERCIAL

## 2020-10-08 ENCOUNTER — OFFICE VISIT (OUTPATIENT)
Dept: FAMILY MEDICINE CLINIC | Facility: CLINIC | Age: 45
End: 2020-10-08

## 2020-10-08 VITALS
HEIGHT: 66 IN | SYSTOLIC BLOOD PRESSURE: 118 MMHG | BODY MASS INDEX: 26.68 KG/M2 | RESPIRATION RATE: 16 BRPM | WEIGHT: 166 LBS | TEMPERATURE: 98 F | DIASTOLIC BLOOD PRESSURE: 70 MMHG | HEART RATE: 72 BPM

## 2020-10-08 DIAGNOSIS — Z13.21 ENCOUNTER FOR VITAMIN DEFICIENCY SCREENING: ICD-10-CM

## 2020-10-08 DIAGNOSIS — Z13.0 SCREENING FOR IRON DEFICIENCY ANEMIA: ICD-10-CM

## 2020-10-08 DIAGNOSIS — Z00.00 ROUTINE GENERAL MEDICAL EXAMINATION AT A HEALTH CARE FACILITY: Primary | ICD-10-CM

## 2020-10-08 DIAGNOSIS — N89.8 VAGINAL DISCHARGE: ICD-10-CM

## 2020-10-08 DIAGNOSIS — Z00.00 LABORATORY EXAMINATION ORDERED AS PART OF A ROUTINE GENERAL MEDICAL EXAMINATION: ICD-10-CM

## 2020-10-08 DIAGNOSIS — B96.89 BACTERIAL VAGINOSIS: ICD-10-CM

## 2020-10-08 DIAGNOSIS — N76.0 BACTERIAL VAGINOSIS: ICD-10-CM

## 2020-10-08 DIAGNOSIS — Z12.31 VISIT FOR SCREENING MAMMOGRAM: ICD-10-CM

## 2020-10-08 DIAGNOSIS — L08.9 INFECTED CYST OF SKIN: ICD-10-CM

## 2020-10-08 DIAGNOSIS — L72.9 INFECTED CYST OF SKIN: ICD-10-CM

## 2020-10-08 DIAGNOSIS — Z13.29 SCREENING FOR THYROID DISORDER: ICD-10-CM

## 2020-10-08 PROCEDURE — 84443 ASSAY THYROID STIM HORMONE: CPT

## 2020-10-08 PROCEDURE — 97530 THERAPEUTIC ACTIVITIES: CPT

## 2020-10-08 PROCEDURE — 82306 VITAMIN D 25 HYDROXY: CPT

## 2020-10-08 PROCEDURE — 99396 PREV VISIT EST AGE 40-64: CPT | Performed by: FAMILY MEDICINE

## 2020-10-08 PROCEDURE — 80061 LIPID PANEL: CPT

## 2020-10-08 PROCEDURE — 80053 COMPREHEN METABOLIC PANEL: CPT

## 2020-10-08 PROCEDURE — 97110 THERAPEUTIC EXERCISES: CPT

## 2020-10-08 PROCEDURE — 85025 COMPLETE CBC W/AUTO DIFF WBC: CPT

## 2020-10-08 PROCEDURE — 87660 TRICHOMONAS VAGIN DIR PROBE: CPT | Performed by: FAMILY MEDICINE

## 2020-10-08 PROCEDURE — 3008F BODY MASS INDEX DOCD: CPT | Performed by: FAMILY MEDICINE

## 2020-10-08 PROCEDURE — 87510 GARDNER VAG DNA DIR PROBE: CPT | Performed by: FAMILY MEDICINE

## 2020-10-08 PROCEDURE — 3074F SYST BP LT 130 MM HG: CPT | Performed by: FAMILY MEDICINE

## 2020-10-08 PROCEDURE — 3078F DIAST BP <80 MM HG: CPT | Performed by: FAMILY MEDICINE

## 2020-10-08 PROCEDURE — 87480 CANDIDA DNA DIR PROBE: CPT | Performed by: FAMILY MEDICINE

## 2020-10-08 RX ORDER — CEPHALEXIN 500 MG/1
500 CAPSULE ORAL 2 TIMES DAILY
Qty: 14 CAPSULE | Refills: 0 | Status: SHIPPED | OUTPATIENT
Start: 2020-10-08 | End: 2020-11-02 | Stop reason: ALTCHOICE

## 2020-10-08 RX ORDER — METRONIDAZOLE 500 MG/1
500 TABLET ORAL 2 TIMES DAILY
Qty: 14 TABLET | Refills: 0 | Status: SHIPPED | OUTPATIENT
Start: 2020-10-08 | End: 2020-11-02 | Stop reason: ALTCHOICE

## 2020-10-08 NOTE — PROGRESS NOTES
:HPI:   Jordon Fonseca is a 40year old female who presents for a complete physical exam.     Physical (well woman no pap no flu shot)   Yeast Infection (pt has had recurring yeast infections)-occurring monthly   Tried otc meds and did not help. vision  HEENT: denies nasal congestion, sinus pain or ST, earache  LUNGS: denies shortness of breath with exertion  CARDIOVASCULAR: denies chest pain on exertion  GI: denies abdominal pain,denies heartburn  : denies dysuria, vaginal discharge or itching, AM    BUN 17 10/08/2020 10:43 AM    CREATSERUM 1.13 (H) 10/08/2020 10:43 AM    GFR 72 06/26/2017 05:43 PM    CA 9.8 10/08/2020 10:43 AM    ALKPHO 97 10/08/2020 10:43 AM    AST 14 (L) 10/08/2020 10:43 AM    ALT 36 10/08/2020 10:43 AM    BILT 1.6 10/08/2020 exercise and diet. The patient indicates understanding of these issues and agrees to the plan. The patient is asked to return for CPX annually and sooner if needed.

## 2020-10-08 NOTE — PROGRESS NOTES
Dx: discogenic low back pain with radicular symptoms R>L         Insurance (Authorized # of Visits):   [facility] [place]           Authorizing Physician: Dr. Martha Isbell  Next MD visit: none scheduled  Fall Risk: standard         Precautions: n/a             Subjective progress achieved in PT     Plan: Continue per plan of care.  Plan for next therapy session: functional activities- normalize movements/mechanics, core strengthening progression   Date: 10/1/2020  TX#: 2/8 Date: 10/6/2020             TX#: 3/8 Date:    10/8/

## 2020-10-12 ENCOUNTER — OFFICE VISIT (OUTPATIENT)
Dept: PHYSICAL THERAPY | Age: 45
End: 2020-10-12
Attending: NURSE PRACTITIONER
Payer: COMMERCIAL

## 2020-10-12 PROCEDURE — 97110 THERAPEUTIC EXERCISES: CPT

## 2020-10-12 NOTE — PROGRESS NOTES
Dx: discogenic low back pain with radicular symptoms R>L         Insurance (Authorized # of Visits):   [place] 8 visits           Authorizing Physician: Dr. Sheffield Began  Next MD visit: none scheduled  Fall Risk: standard         Precautions: n/a             Subjective independent and compliant with comprehensive HEP to maintain progress achieved in PT     Plan: Continue per plan of care.  Plan for next therapy session: functional activities- normalize movements/mechanics, core strengthening progression   Date: 10/1/2020 HEP: seated flexion, knee to chest, and sciatic nerve glides  10/6/2020 ergonomics, supine bracing with marches; LTR to the R only; lumbar roll   10/8/2020 clam shell TRX squat  10/12/2020 try piriformis stretch ;light leg press if no worsening pain to

## 2020-10-14 ENCOUNTER — OFFICE VISIT (OUTPATIENT)
Dept: PHYSICAL THERAPY | Age: 45
End: 2020-10-14
Attending: NURSE PRACTITIONER
Payer: COMMERCIAL

## 2020-10-14 PROCEDURE — 97110 THERAPEUTIC EXERCISES: CPT

## 2020-10-14 NOTE — PROGRESS NOTES
Dx: discogenic low back pain with radicular symptoms R>L         Insurance (Authorized # of Visits):   [place] 8 visits           Authorizing Physician: Dr. Ana Duran  Next MD visit: none scheduled  Fall Risk: standard         Precautions: n/a             Subjective after >30 minutes for work and home activities with pain <3/10.    · Pt will demonstrate improved core strength to be able to perform lifting weights in gym with <3/10 pain   · Pt will be independent and compliant with comprehensive HEP to maintain progress marches 10 reps x 2 sets   Sit to stand 10 reps no UE assist   Leg press level 3 double leg 15 reps   Piriformis stretch 30 sec hold x 3 sets  There ex:   Shuttle level 2 very light double leg press *pain deferred   Supine bridge- unable  Seated HS curls y

## 2020-10-15 ENCOUNTER — PATIENT MESSAGE (OUTPATIENT)
Dept: FAMILY MEDICINE CLINIC | Facility: CLINIC | Age: 45
End: 2020-10-15

## 2020-10-15 ENCOUNTER — TELEPHONE (OUTPATIENT)
Dept: PHYSICAL THERAPY | Age: 45
End: 2020-10-15

## 2020-10-15 NOTE — TELEPHONE ENCOUNTER
From: Kenia Gastelum  To: Noah Marcus NP  Sent: 10/15/2020 9:37 AM CDT  Subject: Non-Urgent Medical Question    I was taking Naproxen and a muscle relaxer for the back and sciatic pain due to the back injury. I finished both about 2 weeks ago.

## 2020-10-19 ENCOUNTER — APPOINTMENT (OUTPATIENT)
Dept: PHYSICAL THERAPY | Age: 45
End: 2020-10-19
Attending: NURSE PRACTITIONER
Payer: COMMERCIAL

## 2020-10-20 RX ORDER — CYCLOBENZAPRINE HCL 10 MG
10 TABLET ORAL NIGHTLY PRN
Qty: 10 TABLET | Refills: 0 | Status: SHIPPED | OUTPATIENT
Start: 2020-10-20 | End: 2020-11-03

## 2020-10-20 RX ORDER — METHYLPREDNISOLONE 4 MG/1
TABLET ORAL
Qty: 1 KIT | Refills: 0 | Status: SHIPPED | OUTPATIENT
Start: 2020-10-20 | End: 2021-02-19 | Stop reason: ALTCHOICE

## 2020-10-21 ENCOUNTER — OFFICE VISIT (OUTPATIENT)
Dept: PHYSICAL THERAPY | Age: 45
End: 2020-10-21
Attending: NURSE PRACTITIONER
Payer: COMMERCIAL

## 2020-10-21 PROCEDURE — 97140 MANUAL THERAPY 1/> REGIONS: CPT

## 2020-10-21 PROCEDURE — 97110 THERAPEUTIC EXERCISES: CPT

## 2020-10-21 NOTE — PROGRESS NOTES
Dx: discogenic low back pain with radicular symptoms R>L         Insurance (Authorized # of Visits):   [place] 8 visits           Authorizing Physician: Dr. Graciela Bowers  Next MD visit: none scheduled  Fall Risk: standard         Precautions: n/a             Subjective symptoms for 3 consecutive days to improve function with ADL   · Pt will have decreased paraspinal mm tension to tolerate standing after >30 minutes for work and home activities with pain <3/10.    · Pt will demonstrate improved core strength to be able to shell 10 reps x 2 sets R/L  TRX squat 10 reps x 2 sets   Seated sciatic nerve glide 10 reps R/L   Supine bracing with marches 10 reps x 2 sets   Sit to stand 10 reps no UE assist   Leg press level 3 double leg 15 reps   Piriformis stretch 30 sec hold x 3 s

## 2020-10-26 ENCOUNTER — OFFICE VISIT (OUTPATIENT)
Dept: PHYSICAL THERAPY | Age: 45
End: 2020-10-26
Attending: NURSE PRACTITIONER
Payer: COMMERCIAL

## 2020-10-26 PROCEDURE — 97140 MANUAL THERAPY 1/> REGIONS: CPT

## 2020-10-26 PROCEDURE — 97110 THERAPEUTIC EXERCISES: CPT

## 2020-10-26 NOTE — PROGRESS NOTES
Dx: discogenic low back pain with radicular symptoms R>L         Insurance (Authorized # of Visits):   [facility] 8 visits           Authorizing Physician: Dr. Gary Kaplan  Next MD visit: none scheduled  Fall Risk: standard         Precautions: n/a            Progress Rios for 3 consecutive days to improve function with ADL   · Pt will have decreased paraspinal mm tension to tolerate standing after >30 minutes for work and home activities with pain <3/10.    · Pt will demonstrate improved core strength to be able to perform l small range tolerated  Standing TKE 10 reps R/L x 2 sets before walking  Emile pose 30 sec hold x 2 sets to relieve tension in HS There ex:   Supine hip held in 90 deg flexion passively with active knee flexion extension to comfort 10 reps x 2 sets   Seat

## 2020-10-27 ENCOUNTER — TELEPHONE (OUTPATIENT)
Dept: PHYSICAL THERAPY | Age: 45
End: 2020-10-27

## 2020-10-28 ENCOUNTER — OFFICE VISIT (OUTPATIENT)
Dept: PHYSICAL THERAPY | Age: 45
End: 2020-10-28
Attending: NURSE PRACTITIONER
Payer: COMMERCIAL

## 2020-10-28 ENCOUNTER — TELEPHONE (OUTPATIENT)
Dept: PHYSICAL THERAPY | Age: 45
End: 2020-10-28

## 2020-10-28 PROCEDURE — 97110 THERAPEUTIC EXERCISES: CPT

## 2020-10-28 NOTE — PROGRESS NOTES
Dx: discogenic low back pain with radicular symptoms R>L         Insurance (Authorized # of Visits):   [facility] 8 visits           Authorizing Physician: Dr. Jes Gandhi  Next MD visit: none scheduled  Fall Risk: standard         Precautions: n/a           Subjective: achieved in PT     Plan: Continue per plan of care.    Date:    10/8/2020              TX#: 4/8 Date: 10/12/2020            TX#: 5/8 Date:10/14/2020    Tx#: 6/8 10/21/2020   Tx#: 7/8 10/26/2020   Tx#: 8/8 10/28/2020   Tx#: 9/16   Warm up Nu step level 5 min tension in HS  Supine hip abduction green band 10 reps    There ex:   Supine hip held in 90 deg flexion passively with active knee flexion extension to comfort 10 reps x 2 sets   Clarine Brisk pose 30 sec hold x 2 sets to relieve tension in HS  Seated TKE 10 reps

## 2020-10-30 ENCOUNTER — TELEPHONE (OUTPATIENT)
Dept: FAMILY MEDICINE CLINIC | Facility: CLINIC | Age: 45
End: 2020-10-30

## 2020-10-30 NOTE — TELEPHONE ENCOUNTER
Called and talked to patient told her she should go to the ED to be seen for this to get pain meds and CT to see about where the stone was

## 2020-10-30 NOTE — TELEPHONE ENCOUNTER
Patient called requesting an appointment for today,states possbly has a kidney stone & is in a lot of pain.  No appointments available, please advise

## 2020-11-02 ENCOUNTER — OFFICE VISIT (OUTPATIENT)
Dept: FAMILY MEDICINE CLINIC | Facility: CLINIC | Age: 45
End: 2020-11-02

## 2020-11-02 ENCOUNTER — HOSPITAL ENCOUNTER (OUTPATIENT)
Dept: ULTRASOUND IMAGING | Facility: HOSPITAL | Age: 45
Discharge: HOME | End: 2020-11-02
Attending: FAMILY MEDICINE
Payer: COMMERCIAL

## 2020-11-02 VITALS
HEIGHT: 66 IN | SYSTOLIC BLOOD PRESSURE: 118 MMHG | DIASTOLIC BLOOD PRESSURE: 72 MMHG | RESPIRATION RATE: 16 BRPM | BODY MASS INDEX: 27 KG/M2 | HEART RATE: 68 BPM | WEIGHT: 168 LBS | TEMPERATURE: 98 F

## 2020-11-02 DIAGNOSIS — R10.11 RUQ PAIN: ICD-10-CM

## 2020-11-02 DIAGNOSIS — R10.10 PAIN OF UPPER ABDOMEN: ICD-10-CM

## 2020-11-02 DIAGNOSIS — R10.10 PAIN OF UPPER ABDOMEN: Primary | ICD-10-CM

## 2020-11-02 PROCEDURE — 99214 OFFICE O/P EST MOD 30 MIN: CPT | Performed by: FAMILY MEDICINE

## 2020-11-02 PROCEDURE — 3078F DIAST BP <80 MM HG: CPT | Performed by: FAMILY MEDICINE

## 2020-11-02 PROCEDURE — 3008F BODY MASS INDEX DOCD: CPT | Performed by: FAMILY MEDICINE

## 2020-11-02 PROCEDURE — 3074F SYST BP LT 130 MM HG: CPT | Performed by: FAMILY MEDICINE

## 2020-11-02 PROCEDURE — 76700 US EXAM ABDOM COMPLETE: CPT | Performed by: FAMILY MEDICINE

## 2020-11-02 PROCEDURE — 81003 URINALYSIS AUTO W/O SCOPE: CPT | Performed by: FAMILY MEDICINE

## 2020-11-02 RX ORDER — NICOTINE POLACRILEX 4 MG/1
20 GUM, CHEWING ORAL DAILY
Qty: 30 TABLET | Refills: 0 | Status: SHIPPED | OUTPATIENT
Start: 2020-11-02 | End: 2020-12-25

## 2020-11-02 NOTE — PROGRESS NOTES
Adamaris Giles is a 40year old female. S:  Patient presents today with the following concerns:   Abdominal Pain (Midabdominal,flank and back pain on and off over the past 2 week. Hx of kidney stones)  · Will shift sides from right to left. murmur  GI:  Normal BS. Soft. Tenderness on palpation of the RUQ and epigastric areas. No organomegaly or guarding.   BACK:  No CVA tenderness to percussion  EXTREMITIES: no edema  NEURO: Oriented times three,cranial nerves are intact,motor and sensory a

## 2020-11-04 ENCOUNTER — OFFICE VISIT (OUTPATIENT)
Dept: PHYSICAL THERAPY | Age: 45
End: 2020-11-04
Attending: NURSE PRACTITIONER
Payer: COMMERCIAL

## 2020-11-04 PROCEDURE — 97110 THERAPEUTIC EXERCISES: CPT

## 2020-11-04 NOTE — PROGRESS NOTES
Dx: discogenic low back pain with radicular symptoms R>L         Insurance (Authorized # of Visits):   [facility] [place]           Authorizing Physician: Dr. Jessica Diaz  Next MD visit: none scheduled  Fall Risk: standard         Precautions: n/a           Subjective: lifting weights in gym with <3/10 pain   · Pt will be independent and compliant with comprehensive HEP to maintain progress achieved in PT     Plan: Continue per plan of care.    Date:10/14/2020    Tx#: 6/8 10/21/2020   Tx#: 7/8 10/26/2020   Tx#: 8/8 10/28/ Seated HS curls yellow band 10 reps x 2 sets   Clam shell 10 reps , 5 reps on the L         Short axis and long axis traction deferred for pain down the TAYLOR Manual:   90/90 HS stretch dynamic 10 reps x 2 sets to comfort  Supine hooklying rolling to HS 5

## 2020-11-09 ENCOUNTER — OFFICE VISIT (OUTPATIENT)
Dept: PHYSICAL THERAPY | Age: 45
End: 2020-11-09
Attending: NURSE PRACTITIONER
Payer: COMMERCIAL

## 2020-11-09 PROCEDURE — 97110 THERAPEUTIC EXERCISES: CPT

## 2020-11-10 NOTE — PROGRESS NOTES
Dx: discogenic low back pain with radicular symptoms R>L         Insurance (Authorized # of Visits):   [facility] 16 visits           Authorizing Physician: Dr. Maddy Ch  Next MD visit: none scheduled  Fall Risk: standard         Precautions: n/a           Subjective: strength to be able to perform lifting weights in gym with <3/10 pain   · Pt will be independent and compliant with comprehensive HEP to maintain progress achieved in PT     Plan: Continue per plan of care.    Date:10/14/2020    Tx#: 6/8 10/21/2020   Tx#: 7 sec hold x 3 sets   Sciatic nerve glides seated TKE 10 reps x 2 sets on the L    Seated HS curls yellow band 10 reps x 2 sets   Clam shell 10 reps , 5 reps on the L      There ex:   [place] Retort pose 30 sec hold x 2 sets to relieve tension in HS  [place] pose 30

## 2020-11-11 ENCOUNTER — OFFICE VISIT (OUTPATIENT)
Dept: PHYSICAL THERAPY | Age: 45
End: 2020-11-11
Attending: NURSE PRACTITIONER
Payer: COMMERCIAL

## 2020-11-11 PROCEDURE — 97110 THERAPEUTIC EXERCISES: CPT

## 2020-11-11 PROCEDURE — 97140 MANUAL THERAPY 1/> REGIONS: CPT

## 2020-11-11 NOTE — PROGRESS NOTES
Dx: discogenic low back pain with radicular symptoms R>L         Insurance (Authorized # of Visits):   [place] 16 visits           Authorizing Physician: Dr. Shana Abdi  Next MD visit: none scheduled  Fall Risk: standard         Precautions: n/a           Pt running Tx#: 12/16   Nu step level 4- 5 min  Nu step level 5- 5 min  TM walking 2.2 mph 5 min  TM walking 2.3 mph 5 min  TM walking 2.3 mph 5 min    There ex:   Supine hip held in 90 deg flexion passively with active knee flexion extension to comfort 10 reps x 2

## 2020-11-16 ENCOUNTER — APPOINTMENT (OUTPATIENT)
Dept: PHYSICAL THERAPY | Age: 45
End: 2020-11-16
Attending: NURSE PRACTITIONER
Payer: COMMERCIAL

## 2020-11-16 ENCOUNTER — TELEPHONE (OUTPATIENT)
Dept: PHYSICAL THERAPY | Age: 45
End: 2020-11-16

## 2020-11-17 ENCOUNTER — TELEPHONE (OUTPATIENT)
Dept: PHYSICAL THERAPY | Age: 45
End: 2020-11-17

## 2020-11-18 ENCOUNTER — OFFICE VISIT (OUTPATIENT)
Dept: PHYSICAL THERAPY | Age: 45
End: 2020-11-18
Attending: NURSE PRACTITIONER
Payer: COMMERCIAL

## 2020-11-18 PROCEDURE — 97110 THERAPEUTIC EXERCISES: CPT

## 2020-11-18 PROCEDURE — 97140 MANUAL THERAPY 1/> REGIONS: CPT

## 2020-11-18 NOTE — PROGRESS NOTES
Dx: discogenic low back pain with radicular symptoms R>L         Insurance (Authorized # of Visits):   O 16 visits           Authorizing Physician: Dr. Andrea Mclaughlin MD visit: none scheduled  Fall Risk: standard         Precautions: n/a             Valeria Majano be able to perform lifting weights in gym with <3/10 pain   · Pt will be independent and compliant with comprehensive HEP to maintain progress achieved in PT     Plan: Continue per plan of care.    10/26/2020   Tx#: 8/8 10/28/2020   Tx#: 9/16 11/4/2020   Tx tension in HS  Ethel pose 30 sec hold x 3 sets on the L  Piriformis stretch 30 sec hold x 3  ASLR: 10 reps R/L   Abdominal bracing with marches 10 reps R/L   Shuttle double leg press level 3 15 reps     Manual:   Long axis traction 10 sec hold 10 reps   L

## 2020-11-19 ENCOUNTER — APPOINTMENT (OUTPATIENT)
Dept: PHYSICAL THERAPY | Age: 45
End: 2020-11-19
Attending: NURSE PRACTITIONER
Payer: COMMERCIAL

## 2020-12-02 ENCOUNTER — OFFICE VISIT (OUTPATIENT)
Dept: PHYSICAL THERAPY | Age: 45
End: 2020-12-02
Attending: NURSE PRACTITIONER
Payer: COMMERCIAL

## 2020-12-02 PROCEDURE — 97110 THERAPEUTIC EXERCISES: CPT

## 2020-12-02 NOTE — PROGRESS NOTES
Dx: discogenic low back pain with radicular symptoms R>L         Insurance (Authorized # of Visits):   O 16 visits           Authorizing Physician: Dr. Tiffany James  Next MD visit: none scheduled  Fall Risk: standard         Precautions: n/a             Eduar with pain <3/10.    · Pt will demonstrate improved core strength to be able to perform lifting weights in gym with <3/10 pain   · Pt will be independent and compliant with comprehensive HEP to maintain progress achieved in PT     Plan: Continue per plan of sets on the L        Manual:   Long axis traction 10 min  There ex:   Greene Ornelas pose 30 sec hold x 2 sets to relieve tension in HS  [place] pose 30 sec hold x 3 sets on the L  Piriformis stretch 30 sec hold x 3  ASLR: 10 reps R/L   Abdominal bracing with march

## 2020-12-07 ENCOUNTER — OFFICE VISIT (OUTPATIENT)
Dept: PHYSICAL THERAPY | Age: 45
End: 2020-12-07
Attending: INTERNAL MEDICINE
Payer: COMMERCIAL

## 2020-12-07 PROCEDURE — 97110 THERAPEUTIC EXERCISES: CPT

## 2020-12-07 NOTE — PROGRESS NOTES
Dx: discogenic low back pain with radicular symptoms R>L         Insurance (Authorized # of Visits):   O 16 visits           Authorizing Physician: Dr. Seble Winkler  Next MD visit: none scheduled  Fall Risk: standard         Precautions: n/a             Rios standing after >30 minutes for work and home activities with pain <3/10.    · Pt will demonstrate improved core strength to be able to perform lifting weights in gym with <3/10 pain   · Pt will be independent and compliant with comprehensive HEP to maintain with SLR actively 10 reps R/L   Core contraction with floating marches 10 reps R/L  [place] pose 30 sec hold x 3 sets on the L  TRX squats 20 reps   Leg press level 3 double leg 10 reps  There ex:  Single leg bridge R/L 10 reps each   Piriformis stretch 30

## 2020-12-10 RX ORDER — NICOTINE POLACRILEX 4 MG/1
20 GUM, CHEWING ORAL DAILY
Qty: 30 TABLET | Refills: 0 | OUTPATIENT
Start: 2020-12-10 | End: 2021-01-09

## 2020-12-10 NOTE — TELEPHONE ENCOUNTER
Refill request for:    Requested Prescriptions     Pending Prescriptions Disp Refills   • Omeprazole 20 MG Oral Tab EC 30 tablet 0     Sig: Take 20 mg by mouth daily.         Last Prescribed Quantity Refills   11/2/2020 30 0     LOV 11/2/2020 Omeprazole was

## 2020-12-14 ENCOUNTER — OFFICE VISIT (OUTPATIENT)
Dept: PHYSICAL THERAPY | Age: 45
End: 2020-12-14
Attending: NURSE PRACTITIONER
Payer: COMMERCIAL

## 2020-12-14 PROCEDURE — 97110 THERAPEUTIC EXERCISES: CPT

## 2020-12-14 NOTE — PROGRESS NOTES
Dx: discogenic low back pain with radicular symptoms R>L         Insurance (Authorized # of Visits):   O 16 visits           Authorizing Physician: Dr. Mark Rainey  Next MD visit: none scheduled  Fall Risk: standard         Precautions: n/a             Connie Harms be able to perform lifting weights in gym with <3/10 pain   · Pt will be independent and compliant with comprehensive HEP to maintain progress achieved in PT     Plan: Continue per plan of care.    11/4/2020   Tx#: 10/16 11/9/2020   Tx#: 11/16 11/11/2020 reps R/L  Delmar pose 30 sec hold x 3 sets on the L  TRX squats 20 reps   Leg press level 3 double leg 10 reps  There ex:  Single leg bridge R/L 10 reps each   Piriformis stretch 30 sec hold x 3 sets   Clam shell red band 10 reps x 2 sets   Shuttle double

## 2020-12-18 ENCOUNTER — TELEPHONE (OUTPATIENT)
Dept: PHYSICAL THERAPY | Facility: HOSPITAL | Age: 45
End: 2020-12-18

## 2020-12-21 ENCOUNTER — APPOINTMENT (OUTPATIENT)
Dept: PHYSICAL THERAPY | Age: 45
End: 2020-12-21
Attending: NURSE PRACTITIONER
Payer: COMMERCIAL

## 2020-12-23 RX ORDER — OMEPRAZOLE 20 MG/1
20 CAPSULE, DELAYED RELEASE ORAL DAILY
COMMUNITY
Start: 2020-11-02 | End: 2020-12-23

## 2020-12-23 RX ORDER — NITROFURANTOIN 25; 75 MG/1; MG/1
100 CAPSULE ORAL EVERY 12 HOURS
COMMUNITY
Start: 2020-12-19 | End: 2021-02-19 | Stop reason: ALTCHOICE

## 2020-12-23 NOTE — TELEPHONE ENCOUNTER
OMEPRAZOLE 20MG CAPSULES     Sig: TAKE ONE CAPSULE BY MOUTH EVERY DAY    Disp:  30 capsule    Refills:  0 (Pharmacy requested: Not specified)    Start: 12/22/2020

## 2020-12-23 NOTE — TELEPHONE ENCOUNTER
LOV 11/02/2020 marlin/Brianne upper abdominal pain  Requested pt report after using med.  No further correspondence from patient  No on Protocol, forward to JATIN Sams

## 2020-12-25 RX ORDER — OMEPRAZOLE 20 MG/1
CAPSULE, DELAYED RELEASE ORAL
Qty: 30 CAPSULE | Refills: 0 | Status: SHIPPED | OUTPATIENT
Start: 2020-12-25

## 2020-12-28 ENCOUNTER — OFFICE VISIT (OUTPATIENT)
Dept: PHYSICAL THERAPY | Age: 45
End: 2020-12-28
Attending: NURSE PRACTITIONER
Payer: COMMERCIAL

## 2020-12-28 PROCEDURE — 97110 THERAPEUTIC EXERCISES: CPT

## 2020-12-28 NOTE — PROGRESS NOTES
Dx: discogenic low back pain with radicular symptoms R>L         Insurance (Authorized # of Visits):   [facility] [place]    Authorizing Physician: Dr. Kaitlyn Hamilton  Next MD visit: none scheduled  Fall Risk: standard         Precautions: n/a            Progress Summary 11/18/2020   Tx#: 13/16 12/2/2020   Tx#: 14/16 12/7/2020   Tx#: 15/16 12/14/2020   Tx#: 16/16 12/28/2020   Tx#: 17/24   TM walking 2.3 mph 5 min  TM walking 2.3 mph 5 min  TM walking 2.3 mph 5 min  TM walking 2.3 mph 5 min  TM walking 2.3 mph 5 min  TM wal reps   - no pain after this      There ex:  Piriformis stretch 30 sec hold x 3 sets on the L  Bridge double leg 10 reps   Single leg bridge 10 reps R/L  Clam shell red band 20 reps R/L   RDL 10 reps R/L   Shuttle double leg press level 6 20 reps   Shuttle

## 2021-01-06 ENCOUNTER — APPOINTMENT (OUTPATIENT)
Dept: PHYSICAL THERAPY | Age: 46
End: 2021-01-06
Attending: FAMILY MEDICINE
Payer: COMMERCIAL

## 2021-01-07 ENCOUNTER — TELEPHONE (OUTPATIENT)
Dept: FAMILY MEDICINE CLINIC | Facility: CLINIC | Age: 46
End: 2021-01-07

## 2021-01-07 NOTE — TELEPHONE ENCOUNTER
Left message for patient on mobile phone that PT has been approved. She is given phone number to make appt.

## 2021-01-12 ENCOUNTER — OFFICE VISIT (OUTPATIENT)
Dept: PHYSICAL THERAPY | Age: 46
End: 2021-01-12
Attending: NURSE PRACTITIONER
Payer: COMMERCIAL

## 2021-01-12 PROCEDURE — 97110 THERAPEUTIC EXERCISES: CPT

## 2021-01-12 NOTE — PROGRESS NOTES
Dx: discogenic low back pain with radicular symptoms R>L         Ins[place]Authorized [place] of [place]its[place]   [facility] [place]    Authorizing Physician: Dr. Jesse Candelaria  Next MD visit: none scheduled  Fall Risk: standard         Precautions: n/a             Subjective: Pt s after >30 minutes for work and home activities with pain <3/10.    · Pt will demonstrate improved core strength to be able to perform lifting weights in gym with <3/10 pain   · Pt will be independent and compliant with comprehensive HEP to maintain progress band at shins, squat 20' R/L ; red band at ankles 15' R/L   Supine core strengthening: reviewed bracing with SLR; crunches for what doing at gym There ex:  Single leg bridge R/L 10 reps x 2 sets each   Piriformis stretch 30 sec hold x 3 sets   Clam shell r hold during assisted pull up to reduce lumbar extension moment    Charges:   There ex:3 Total Timed Treatment: 40 min  Total Treatment Time: 40 min

## 2021-02-03 ENCOUNTER — TELEPHONE (OUTPATIENT)
Dept: PHYSICAL THERAPY | Facility: HOSPITAL | Age: 46
End: 2021-02-03

## 2021-02-03 ENCOUNTER — APPOINTMENT (OUTPATIENT)
Dept: PHYSICAL THERAPY | Age: 46
End: 2021-02-03
Attending: NURSE PRACTITIONER
Payer: COMMERCIAL

## 2021-02-16 ENCOUNTER — TELEPHONE (OUTPATIENT)
Dept: FAMILY MEDICINE CLINIC | Facility: CLINIC | Age: 46
End: 2021-02-16

## 2021-02-16 ENCOUNTER — APPOINTMENT (OUTPATIENT)
Dept: PHYSICAL THERAPY | Age: 46
End: 2021-02-16
Attending: NURSE PRACTITIONER
Payer: COMMERCIAL

## 2021-02-16 NOTE — TELEPHONE ENCOUNTER
Pt is out of her Cephalexin 500 mg Metronidazole 500 mg please send to [facility] [address]. She is completely out.

## 2021-02-19 ENCOUNTER — OFFICE VISIT (OUTPATIENT)
Dept: FAMILY MEDICINE CLINIC | Facility: CLINIC | Age: 46
End: 2021-02-19

## 2021-02-19 VITALS
SYSTOLIC BLOOD PRESSURE: 128 MMHG | HEART RATE: 60 BPM | BODY MASS INDEX: 27.32 KG/M2 | HEIGHT: 66 IN | DIASTOLIC BLOOD PRESSURE: 78 MMHG | RESPIRATION RATE: 16 BRPM | TEMPERATURE: 98 F | WEIGHT: 170 LBS

## 2021-02-19 DIAGNOSIS — Z11.3 ROUTINE SCREENING FOR STI (SEXUALLY TRANSMITTED INFECTION): ICD-10-CM

## 2021-02-19 DIAGNOSIS — Z00.00 ROUTINE HEALTH MAINTENANCE: ICD-10-CM

## 2021-02-19 DIAGNOSIS — N89.8 VAGINAL DISCHARGE: Primary | ICD-10-CM

## 2021-02-19 PROCEDURE — 87591 N.GONORRHOEAE DNA AMP PROB: CPT | Performed by: FAMILY MEDICINE

## 2021-02-19 PROCEDURE — 3074F SYST BP LT 130 MM HG: CPT | Performed by: FAMILY MEDICINE

## 2021-02-19 PROCEDURE — 87660 TRICHOMONAS VAGIN DIR PROBE: CPT | Performed by: FAMILY MEDICINE

## 2021-02-19 PROCEDURE — 87480 CANDIDA DNA DIR PROBE: CPT | Performed by: FAMILY MEDICINE

## 2021-02-19 PROCEDURE — 87491 CHLMYD TRACH DNA AMP PROBE: CPT | Performed by: FAMILY MEDICINE

## 2021-02-19 PROCEDURE — 87510 GARDNER VAG DNA DIR PROBE: CPT | Performed by: FAMILY MEDICINE

## 2021-02-19 PROCEDURE — 3078F DIAST BP <80 MM HG: CPT | Performed by: FAMILY MEDICINE

## 2021-02-19 PROCEDURE — 99213 OFFICE O/P EST LOW 20 MIN: CPT | Performed by: FAMILY MEDICINE

## 2021-02-19 PROCEDURE — 3008F BODY MASS INDEX DOCD: CPT | Performed by: FAMILY MEDICINE

## 2021-02-19 RX ORDER — METRONIDAZOLE 500 MG/1
500 TABLET ORAL 2 TIMES DAILY
Qty: 14 TABLET | Refills: 0 | Status: SHIPPED | OUTPATIENT
Start: 2021-02-19 | End: 2021-04-29

## 2021-02-19 NOTE — PROGRESS NOTES
Chief Complaint:  Patient presents with:  Vaginal Discharge: x 1 week    HPI:  This is a 39year old female patient presenting for Vaginal Discharge (x 1 week)    Last week started with irritating malodorous discharge. Denies dysuria, pelvic pain.     Healt inspection   LUNGS: clear to auscultation bilaterally, no wheezes, rales or rhonchi, good air movement  CV: HRRR, no murmurs, no peripheral edema   EXTREMITIES: warm and well perfused  : Normal perineum, vulva, vaginal mucosa, cervix, uterus and adnexa,

## 2021-02-22 ENCOUNTER — LAB ENCOUNTER (OUTPATIENT)
Dept: LAB | Age: 46
End: 2021-02-22
Attending: FAMILY MEDICINE
Payer: COMMERCIAL

## 2021-02-22 DIAGNOSIS — Z00.00 ROUTINE HEALTH MAINTENANCE: ICD-10-CM

## 2021-02-22 DIAGNOSIS — Z11.3 ROUTINE SCREENING FOR STI (SEXUALLY TRANSMITTED INFECTION): ICD-10-CM

## 2021-02-22 DIAGNOSIS — N89.8 VAGINAL DISCHARGE: ICD-10-CM

## 2021-02-22 LAB
ALBUMIN SERPL-MCNC: 4.2 G/DL (ref 3.4–5)
ALBUMIN/GLOB SERPL: 1.2 {RATIO} (ref 1–2)
ALP LIVER SERPL-CCNC: 107 U/L
ALT SERPL-CCNC: 94 U/L
ANION GAP SERPL CALC-SCNC: 5 MMOL/L (ref 0–18)
AST SERPL-CCNC: 30 U/L (ref 15–37)
BILIRUB SERPL-MCNC: 1.1 MG/DL (ref 0.1–2)
BUN BLD-MCNC: 18 MG/DL (ref 7–18)
BUN/CREAT SERPL: 13.7 (ref 10–20)
C TRACH DNA SPEC QL NAA+PROBE: NEGATIVE
CALCIUM BLD-MCNC: 9.6 MG/DL (ref 8.5–10.1)
CHLORIDE SERPL-SCNC: 107 MMOL/L (ref 98–112)
CHOLEST SMN-MCNC: 142 MG/DL (ref ?–200)
CO2 SERPL-SCNC: 30 MMOL/L (ref 21–32)
CREAT BLD-MCNC: 1.31 MG/DL
DEPRECATED RDW RBC AUTO: 41.5 FL (ref 35.1–46.3)
ERYTHROCYTE [DISTWIDTH] IN BLOOD BY AUTOMATED COUNT: 13.6 % (ref 11–15)
GLOBULIN PLAS-MCNC: 3.4 G/DL (ref 2.8–4.4)
GLUCOSE BLD-MCNC: 85 MG/DL (ref 70–99)
HCT VFR BLD AUTO: 43.6 %
HDLC SERPL-MCNC: 64 MG/DL (ref 40–59)
HGB BLD-MCNC: 13.9 G/DL
LDLC SERPL CALC-MCNC: 68 MG/DL (ref ?–100)
M PROTEIN MFR SERPL ELPH: 7.6 G/DL (ref 6.4–8.2)
MCH RBC QN AUTO: 26.7 PG (ref 26–34)
MCHC RBC AUTO-ENTMCNC: 31.9 G/DL (ref 31–37)
MCV RBC AUTO: 83.8 FL
N GONORRHOEA DNA SPEC QL NAA+PROBE: NEGATIVE
NONHDLC SERPL-MCNC: 78 MG/DL (ref ?–130)
OSMOLALITY SERPL CALC.SUM OF ELEC: 295 MOSM/KG (ref 275–295)
PATIENT FASTING Y/N/NP: YES
PATIENT FASTING Y/N/NP: YES
PLATELET # BLD AUTO: 193 10(3)UL (ref 150–450)
POTASSIUM SERPL-SCNC: 4.3 MMOL/L (ref 3.5–5.1)
RBC # BLD AUTO: 5.2 X10(6)UL
SODIUM SERPL-SCNC: 142 MMOL/L (ref 136–145)
T PALLIDUM AB SER QL IA: NONREACTIVE
TRIGL SERPL-MCNC: 49 MG/DL (ref 30–149)
TSI SER-ACNC: 0.62 MIU/ML (ref 0.36–3.74)
VIT D+METAB SERPL-MCNC: 33.8 NG/ML (ref 30–100)
VLDLC SERPL CALC-MCNC: 10 MG/DL (ref 0–30)
WBC # BLD AUTO: 5.6 X10(3) UL (ref 4–11)

## 2021-02-22 PROCEDURE — 80053 COMPREHEN METABOLIC PANEL: CPT

## 2021-02-22 PROCEDURE — 84443 ASSAY THYROID STIM HORMONE: CPT

## 2021-02-22 PROCEDURE — 80061 LIPID PANEL: CPT

## 2021-02-22 PROCEDURE — 85027 COMPLETE CBC AUTOMATED: CPT

## 2021-02-22 PROCEDURE — 87389 HIV-1 AG W/HIV-1&-2 AB AG IA: CPT

## 2021-02-22 PROCEDURE — 36415 COLL VENOUS BLD VENIPUNCTURE: CPT

## 2021-02-22 PROCEDURE — 82306 VITAMIN D 25 HYDROXY: CPT

## 2021-02-22 PROCEDURE — 86780 TREPONEMA PALLIDUM: CPT

## 2021-02-23 ENCOUNTER — TELEPHONE (OUTPATIENT)
Dept: PHYSICAL THERAPY | Facility: HOSPITAL | Age: 46
End: 2021-02-23

## 2021-02-23 ENCOUNTER — TELEPHONE (OUTPATIENT)
Dept: PHYSICAL THERAPY | Age: 46
End: 2021-02-23

## 2021-02-26 DIAGNOSIS — R79.89 ELEVATED LFTS: ICD-10-CM

## 2021-02-26 DIAGNOSIS — R79.89 ELEVATED SERUM CREATININE: Primary | ICD-10-CM

## 2021-03-01 ENCOUNTER — OFFICE VISIT (OUTPATIENT)
Dept: PHYSICAL THERAPY | Age: 46
End: 2021-03-01
Attending: NURSE PRACTITIONER
Payer: COMMERCIAL

## 2021-03-01 PROCEDURE — 97110 THERAPEUTIC EXERCISES: CPT

## 2021-03-02 NOTE — PROGRESS NOTES
Dx: L side lumbar radiculopathy         Insurance (Authorized # of [place]its[place]   [facility] [place]    Authorizing Physician: Dr. Alpesh Sargent  Next MD visit: none scheduled  Fall Risk: standard         Precautions: n/a            Progress/Discharge Summary  Pt has attende function with ADL  - not yet MET  · Pt will have decreased paraspinal mm tension to tolerate standing after >30 minutes for work and home activities with pain <3/10.    · Pt will demonstrate improved core strength to be able to perform lifting weights in gy 10 reps x 2 sets   Lateral walk red band at shins, squat 20' R/L ; red band at ankles 15' R/L   Supine core strengthening: reviewed bracing with SLR; crunches for what doing at gym There ex:  Single leg bridge R/L 10 reps x 2 sets each   Piriformis stretch HS strengthening, POC update, plan for next therapy session Pt education: sleeping position, discharge plans, progress since starting, goals for HEP updates, questions or concerns, contact info provided, use of the stretches and goals of these stretches, d

## 2021-03-10 ENCOUNTER — PATIENT MESSAGE (OUTPATIENT)
Dept: FAMILY MEDICINE CLINIC | Facility: CLINIC | Age: 46
End: 2021-03-10

## 2021-03-10 NOTE — TELEPHONE ENCOUNTER
From: Ezequiel Gastelum  To:  Celio Infante DO  Sent: 3/10/2021 2:37 PM CST  Subject: Test Results Catalina Oliveira, Do I need to make an appointment to see you again or are you going to create lab orders for me to retest for the creatin

## 2021-03-15 ENCOUNTER — APPOINTMENT (OUTPATIENT)
Dept: PHYSICAL THERAPY | Age: 46
End: 2021-03-15
Attending: NURSE PRACTITIONER
Payer: COMMERCIAL

## 2021-03-22 ENCOUNTER — TELEPHONE (OUTPATIENT)
Dept: FAMILY MEDICINE CLINIC | Facility: CLINIC | Age: 46
End: 2021-03-22

## 2021-03-22 ENCOUNTER — LAB ENCOUNTER (OUTPATIENT)
Dept: LAB | Age: 46
End: 2021-03-22
Attending: FAMILY MEDICINE
Payer: COMMERCIAL

## 2021-03-22 DIAGNOSIS — R79.89 ELEVATED SERUM CREATININE: ICD-10-CM

## 2021-03-22 DIAGNOSIS — R79.89 ELEVATED LFTS: ICD-10-CM

## 2021-03-22 LAB
ALBUMIN SERPL-MCNC: 4.1 G/DL (ref 3.4–5)
ALBUMIN/GLOB SERPL: 1.3 {RATIO} (ref 1–2)
ALP LIVER SERPL-CCNC: 86 U/L
ALT SERPL-CCNC: 50 U/L
ANION GAP SERPL CALC-SCNC: 1 MMOL/L (ref 0–18)
AST SERPL-CCNC: 27 U/L (ref 15–37)
BILIRUB SERPL-MCNC: 0.7 MG/DL (ref 0.1–2)
BUN BLD-MCNC: 19 MG/DL (ref 7–18)
BUN/CREAT SERPL: 19.8 (ref 10–20)
CALCIUM BLD-MCNC: 9.5 MG/DL (ref 8.5–10.1)
CHLORIDE SERPL-SCNC: 108 MMOL/L (ref 98–112)
CO2 SERPL-SCNC: 32 MMOL/L (ref 21–32)
CREAT BLD-MCNC: 0.96 MG/DL
GLOBULIN PLAS-MCNC: 3.1 G/DL (ref 2.8–4.4)
GLUCOSE BLD-MCNC: 89 MG/DL (ref 70–99)
HBV SURFACE AB SER QL: REACTIVE
HBV SURFACE AB SERPL IA-ACNC: 19.78 MIU/ML
HBV SURFACE AG SER-ACNC: <0.1 [IU]/L
HBV SURFACE AG SERPL QL IA: NONREACTIVE
HCV AB SERPL QL IA: NONREACTIVE
IRON SATURATION: 20 %
IRON SERPL-MCNC: 69 UG/DL
M PROTEIN MFR SERPL ELPH: 7.2 G/DL (ref 6.4–8.2)
OSMOLALITY SERPL CALC.SUM OF ELEC: 294 MOSM/KG (ref 275–295)
PATIENT FASTING Y/N/NP: YES
POTASSIUM SERPL-SCNC: 4.4 MMOL/L (ref 3.5–5.1)
SODIUM SERPL-SCNC: 141 MMOL/L (ref 136–145)
TOTAL IRON BINDING CAPACITY: 343 UG/DL (ref 240–450)
TRANSFERRIN SERPL-MCNC: 230 MG/DL (ref 200–360)

## 2021-03-22 PROCEDURE — 86038 ANTINUCLEAR ANTIBODIES: CPT

## 2021-03-22 PROCEDURE — 83540 ASSAY OF IRON: CPT

## 2021-03-22 PROCEDURE — 36415 COLL VENOUS BLD VENIPUNCTURE: CPT

## 2021-03-22 PROCEDURE — 87340 HEPATITIS B SURFACE AG IA: CPT

## 2021-03-22 PROCEDURE — 80053 COMPREHEN METABOLIC PANEL: CPT

## 2021-03-22 PROCEDURE — 83550 IRON BINDING TEST: CPT

## 2021-03-22 PROCEDURE — 86706 HEP B SURFACE ANTIBODY: CPT

## 2021-03-22 PROCEDURE — 86803 HEPATITIS C AB TEST: CPT

## 2021-03-22 NOTE — TELEPHONE ENCOUNTER
Pt states she got her lab results back via [facility]. Advised pt results have not been resulted yet. Pt is requesting a call back once Dr. Henry Likes reviews them.

## 2021-03-26 LAB — ANA SCREEN: NEGATIVE

## 2021-04-29 ENCOUNTER — OFFICE VISIT (OUTPATIENT)
Dept: FAMILY MEDICINE CLINIC | Facility: CLINIC | Age: 46
End: 2021-04-29

## 2021-04-29 VITALS
TEMPERATURE: 98 F | SYSTOLIC BLOOD PRESSURE: 100 MMHG | HEART RATE: 62 BPM | RESPIRATION RATE: 16 BRPM | DIASTOLIC BLOOD PRESSURE: 68 MMHG | BODY MASS INDEX: 27.16 KG/M2 | WEIGHT: 169 LBS | HEIGHT: 66 IN

## 2021-04-29 DIAGNOSIS — M54.32 SCIATIC PAIN, LEFT: Primary | ICD-10-CM

## 2021-04-29 PROCEDURE — 3074F SYST BP LT 130 MM HG: CPT | Performed by: NURSE PRACTITIONER

## 2021-04-29 PROCEDURE — 3078F DIAST BP <80 MM HG: CPT | Performed by: NURSE PRACTITIONER

## 2021-04-29 PROCEDURE — 3008F BODY MASS INDEX DOCD: CPT | Performed by: NURSE PRACTITIONER

## 2021-04-29 PROCEDURE — 99213 OFFICE O/P EST LOW 20 MIN: CPT | Performed by: NURSE PRACTITIONER

## 2021-04-29 NOTE — PROGRESS NOTES
Patient presents with:  Leg Pain: sciatica pain in the left leg, has doen pt and would like to discuss next steps       HPI:  Presents with approx 7-8 month history of left buttock, posterior thigh and posterior knee pain which has been waxing and waning f cyclobenzaprine. Stated can manage pain for now but wants to get to root cause of issue and fix it. Referred to Dr. Dario Galvan for further eval. Instructed to notify me if pain/symptoms worsen before she can see Dr. Dario Galvan or if she has new symptoms.  Rupert Galvez

## 2021-04-29 NOTE — PATIENT INSTRUCTIONS
May take one of the following: Advil, Motrin or any ibuprofen product of your choice or Aleve as per  instructions for dose and time scheduling.       Apply warm compress or heating pad to back 3-4 times daily for 15-20 minutes eac

## 2021-05-04 ENCOUNTER — OFFICE VISIT (OUTPATIENT)
Dept: SURGERY | Facility: CLINIC | Age: 46
End: 2021-05-04

## 2021-05-04 VITALS
SYSTOLIC BLOOD PRESSURE: 110 MMHG | HEIGHT: 66 IN | WEIGHT: 169 LBS | DIASTOLIC BLOOD PRESSURE: 80 MMHG | BODY MASS INDEX: 27.16 KG/M2

## 2021-05-04 DIAGNOSIS — M54.16 LUMBAR RADICULOPATHY: Primary | ICD-10-CM

## 2021-05-04 PROCEDURE — 3008F BODY MASS INDEX DOCD: CPT | Performed by: PHYSICIAN ASSISTANT

## 2021-05-04 PROCEDURE — 3079F DIAST BP 80-89 MM HG: CPT | Performed by: PHYSICIAN ASSISTANT

## 2021-05-04 PROCEDURE — 3074F SYST BP LT 130 MM HG: CPT | Performed by: PHYSICIAN ASSISTANT

## 2021-05-04 PROCEDURE — 99214 OFFICE O/P EST MOD 30 MIN: CPT | Performed by: PHYSICIAN ASSISTANT

## 2021-05-04 NOTE — PROGRESS NOTES
Patient: Susi Hall [facility]  Medical Record Number: [id number]  PCP: Evert Gonzalez MD      HISTORY OF CHIEF COMPLAINT:    Hosea Redding is a 39year old female, who complains of 9 months h/o low back pain and leg pain.  Pain initially sta Activity      Alcohol use:  Yes        Alcohol/week: 1.0 standard drinks        Types: 1 Standard drinks or equivalent per week        Comment: 2 drinks a month      Drug use: No      Sexual activity: Yes        Partners: Male    Other Topics      Concerns: neck, trunk and arms/legs. No rashes, mottling or ulcerations. LYMPH EXAM: There is no edema in bilateral lower extremities. VASCULAR EXAM: pulses are normal bilaterally, with good distal perfusion. No clubbing or cyanosis.  Calves supple, NT   ABDOMINAL

## 2021-05-04 NOTE — PROGRESS NOTES
Pain for over 1 year  Did have an issue when she was working out and hurt her leg  Had PT for a year  She feels that she did get a lot better with PT, but the sciatic nerve didn't get better  Laying flat is painful, feels like everything is pulling down  M

## 2021-05-07 ENCOUNTER — HOSPITAL ENCOUNTER (OUTPATIENT)
Dept: MRI IMAGING | Age: 46
Discharge: HOME | End: 2021-05-07
Attending: PHYSICIAN ASSISTANT
Payer: COMMERCIAL

## 2021-05-07 ENCOUNTER — HOSPITAL ENCOUNTER (OUTPATIENT)
Dept: GENERAL RADIOLOGY | Age: 46
Discharge: HOME | End: 2021-05-07
Attending: PHYSICIAN ASSISTANT
Payer: COMMERCIAL

## 2021-05-07 DIAGNOSIS — M54.16 LUMBAR RADICULOPATHY: ICD-10-CM

## 2021-05-07 PROCEDURE — 72148 MRI LUMBAR SPINE W/O DYE: CPT | Performed by: PHYSICIAN ASSISTANT

## 2021-05-07 PROCEDURE — 72114 X-RAY EXAM L-S SPINE BENDING: CPT | Performed by: PHYSICIAN ASSISTANT

## 2021-05-11 ENCOUNTER — OFFICE VISIT (OUTPATIENT)
Dept: SURGERY | Facility: CLINIC | Age: 46
End: 2021-05-11

## 2021-05-11 VITALS
WEIGHT: 169 LBS | HEIGHT: 66 IN | DIASTOLIC BLOOD PRESSURE: 76 MMHG | SYSTOLIC BLOOD PRESSURE: 110 MMHG | BODY MASS INDEX: 27.16 KG/M2

## 2021-05-11 DIAGNOSIS — M54.16 LUMBAR RADICULOPATHY: ICD-10-CM

## 2021-05-11 DIAGNOSIS — M51.26 HNP (HERNIATED NUCLEUS PULPOSUS), LUMBAR: Primary | ICD-10-CM

## 2021-05-11 PROCEDURE — 3078F DIAST BP <80 MM HG: CPT | Performed by: PHYSICIAN ASSISTANT

## 2021-05-11 PROCEDURE — 3008F BODY MASS INDEX DOCD: CPT | Performed by: PHYSICIAN ASSISTANT

## 2021-05-11 PROCEDURE — 99213 OFFICE O/P EST LOW 20 MIN: CPT | Performed by: PHYSICIAN ASSISTANT

## 2021-05-11 PROCEDURE — 3074F SYST BP LT 130 MM HG: CPT | Performed by: PHYSICIAN ASSISTANT

## 2021-05-12 PROBLEM — M51.26 HNP (HERNIATED NUCLEUS PULPOSUS), LUMBAR: Status: ACTIVE | Noted: 2021-05-12

## 2021-05-12 NOTE — PROGRESS NOTES
Patient: Hope Lindsay  Medical Record Number: [id number]  Referring Physician: Luther Spangler  PCP: Petra Solis MD    HISTORY OF CHIEF COMPLAINT:    Hope Lindsay is a 39year old female, who presents for f/u of continued low back heights and disc heights are maintained.  There is disc desiccation at L5-S1.  No suspicious focal osseous lesion is identified.  The conus and cauda equina nerve roots are unremarkable in caliber and signal.       T12-L1:  Unremarkable.       L1-L2: Gray Mater gait      MEDICAL DECISION MAKING:   Impression: L5-S1 HNP  Lumbar radiculopathy    Plan: We discussed the diagnosis and treatment options today. The patient has continued pain despite PT and medications. She does have a disc herniation at L5-S1.  She was g

## 2021-05-19 ENCOUNTER — TELEPHONE (OUTPATIENT)
Dept: NEUROLOGY | Facility: CLINIC | Age: 46
End: 2021-05-19

## 2021-05-19 ENCOUNTER — OFFICE VISIT (OUTPATIENT)
Dept: PAIN CLINIC | Facility: CLINIC | Age: 46
End: 2021-05-19

## 2021-05-19 VITALS
BODY MASS INDEX: 27 KG/M2 | DIASTOLIC BLOOD PRESSURE: 68 MMHG | WEIGHT: 169 LBS | HEART RATE: 65 BPM | OXYGEN SATURATION: 97 % | SYSTOLIC BLOOD PRESSURE: 108 MMHG

## 2021-05-19 DIAGNOSIS — M54.16 LUMBAR RADICULOPATHY: Primary | ICD-10-CM

## 2021-05-19 DIAGNOSIS — M51.26 HNP (HERNIATED NUCLEUS PULPOSUS), LUMBAR: ICD-10-CM

## 2021-05-19 PROCEDURE — 3078F DIAST BP <80 MM HG: CPT | Performed by: ANESTHESIOLOGY

## 2021-05-19 PROCEDURE — 99243 OFF/OP CNSLTJ NEW/EST LOW 30: CPT | Performed by: ANESTHESIOLOGY

## 2021-05-19 PROCEDURE — 3074F SYST BP LT 130 MM HG: CPT | Performed by: ANESTHESIOLOGY

## 2021-05-19 NOTE — TELEPHONE ENCOUNTER
Created [facility] Referral for Left L5 TLESI ([id number]) x1   Uploaded Clinicals   Referral #:[id number]  Case Pending

## 2021-05-19 NOTE — H&P
Name: Facundo Whaley   : 1975   DOS: 2021     Chief complaint: Low back pain    History of present illness:  Facundo Whaley is a 39year old female referred today for evaluation of low back pain.   The patient began experie kg/m²    The patient is awake, alert, oriented and corporative. She has a normal affect. The patient ambulates with normal gait. HEENT: No gross lesion noted. PEERL. No icterus. Neck and Upper Extremity: Supple. No thyromegaly or lymphadenopathy.   Motor epidural steroid injection. Since her symptoms are 90% on the left, left L5 level. Procedural details reviewed. All questions addressed.         Shahana Lilly MD  Pain Management

## 2021-05-19 NOTE — PROGRESS NOTES
HPI/Subjective:   Patient ID: Liliane Gonzalez is a 39year old female.     HPI    History/Other:   Review of Systems  Current Outpatient Medications   Medication Sig Dispense Refill   • OMEPRAZOLE 20 MG Oral Capsule Delayed Release TAKE ONE CAPSULE

## 2021-05-24 NOTE — TELEPHONE ENCOUNTER
Patient advised of insurance approval to proceed with injections and is agreeable to scheduling. Patient scheduled for procedure, pre-procedure instructions reviewed. Patient prefers local sedation.  Reviewed sedation instructions including no need to be fa medications:  • Aggrenox 10 days   • Agrylin (Anagrelide) 10 days  • Brilinta (Ticagrelor) 7 days  • Imbruvica (Ibrutinib) 3 days   • Enbrel (Etanercept) 24 hours   • Fragmin (Dalteparin) 24 hours   • Pletal (Cilostazol) 7 days  • Effient (Prasugrel) 7 day If you are a diabetic, please increase the frequency of your glucose monitoring after the procedure as this may cause a temporary increase in your blood sugar.   Contact your primary care physician if your blood sugar rises as you may require some medicati

## 2021-05-24 NOTE — TELEPHONE ENCOUNTER
Left L5 TLESI ([id number]) x 1    I[facility] referral approved for 1 visit from 5/19/21-8/24/21    Ok to schedule

## 2021-05-24 NOTE — TELEPHONE ENCOUNTER
Question Answer Comment   Anesthesia Type Local    Provider [facility]    Procedure Transforaminal    Laterality/Level left L5    Medical clearance requested (will send to Pain Navigator) No    Patient has Medicare coverage?  No    Comments (Pleas

## 2021-06-01 ENCOUNTER — TELEPHONE (OUTPATIENT)
Dept: FAMILY MEDICINE CLINIC | Facility: CLINIC | Age: 46
End: 2021-06-01

## 2021-06-01 ENCOUNTER — HOSPITAL ENCOUNTER (OUTPATIENT)
Facility: HOSPITAL | Age: 46
Setting detail: HOSPITAL OUTPATIENT SURGERY
Discharge: HOME | End: 2021-06-01
Attending: ANESTHESIOLOGY | Admitting: ANESTHESIOLOGY
Payer: COMMERCIAL

## 2021-06-01 ENCOUNTER — APPOINTMENT (OUTPATIENT)
Dept: GENERAL RADIOLOGY | Facility: HOSPITAL | Age: 46
End: 2021-06-01
Attending: ANESTHESIOLOGY
Payer: COMMERCIAL

## 2021-06-01 VITALS
HEART RATE: 80 BPM | SYSTOLIC BLOOD PRESSURE: 117 MMHG | DIASTOLIC BLOOD PRESSURE: 85 MMHG | RESPIRATION RATE: 18 BRPM | OXYGEN SATURATION: 100 % | TEMPERATURE: 98 F

## 2021-06-01 DIAGNOSIS — M54.16 LUMBAR RADICULOPATHY: ICD-10-CM

## 2021-06-01 DIAGNOSIS — M54.16 LUMBAR RADICULOPATHY: Primary | ICD-10-CM

## 2021-06-01 PROCEDURE — 3E0R33Z INTRODUCTION OF ANTI-INFLAMMATORY INTO SPINAL CANAL, PERCUTANEOUS APPROACH: ICD-10-PCS | Performed by: ANESTHESIOLOGY

## 2021-06-01 PROCEDURE — 81025 URINE PREGNANCY TEST: CPT

## 2021-06-01 PROCEDURE — 81025 URINE PREGNANCY TEST: CPT | Performed by: ANESTHESIOLOGY

## 2021-06-01 RX ORDER — DIPHENHYDRAMINE HYDROCHLORIDE 50 MG/ML
50 INJECTION INTRAMUSCULAR; INTRAVENOUS ONCE AS NEEDED
Status: DISCONTINUED | OUTPATIENT
Start: 2021-06-01 | End: 2021-06-01

## 2021-06-01 RX ORDER — ONDANSETRON 2 MG/ML
4 INJECTION INTRAMUSCULAR; INTRAVENOUS ONCE AS NEEDED
Status: DISCONTINUED | OUTPATIENT
Start: 2021-06-01 | End: 2021-06-01

## 2021-06-01 RX ORDER — LIDOCAINE HYDROCHLORIDE 10 MG/ML
INJECTION, SOLUTION EPIDURAL; INFILTRATION; INTRACAUDAL; PERINEURAL
Status: DISCONTINUED | OUTPATIENT
Start: 2021-06-01 | End: 2021-06-01

## 2021-06-01 RX ORDER — SODIUM CHLORIDE 9 MG/ML
INJECTION INTRAVENOUS
Status: DISCONTINUED | OUTPATIENT
Start: 2021-06-01 | End: 2021-06-01

## 2021-06-01 RX ORDER — DEXAMETHASONE SODIUM PHOSPHATE 10 MG/ML
INJECTION, SOLUTION INTRAMUSCULAR; INTRAVENOUS
Status: DISCONTINUED | OUTPATIENT
Start: 2021-06-01 | End: 2021-06-01

## 2021-06-01 NOTE — TELEPHONE ENCOUNTER
Pt is requesting follow-up referral for Dr. Costa Capps. Pt had procedure done today is scheduled to see  on 6/13/21 at 10 am.     Please notify pt once it has been completed.

## 2021-06-01 NOTE — OPERATIVE REPORT
[facility]  Operative Report  2021     Sherri Gastelum Patient Status:  Hospital Outpatient Surgery    1975 MRN [id number]   Location [address] [place] [address] Attending Leola Samuel MD   [facility] Day # 0 BRAD Garza point, 2 cc of normal saline with 10 mg of dexamethasone was injected without complication. The needle was withdrawn with stylet in situ. The patient tolerated procedure very well. The patient was observed until discharge criteria met.   Discharge instruc

## 2021-06-01 NOTE — H&P
History & Physical Examination    Patient Name: Adriana Mark  MRN: [id number]  CSN: [id number]  YOB: 1975    Pre-Operative Diagnosis:  Lumbar radiculopathy [M54.16]    Present Illness: Patient with lumbar radiculopathy for transfo

## 2021-06-02 NOTE — TELEPHONE ENCOUNTER
Referral request Dr. Edith Lang M.D.   Office notes from Dr. Brooke Moffett M.D. 6/1/2021  Indication: Ishan Arauz is a 39year old female with lumbar radiculopathy  Preoperative Diagnosis:  Lumbar radiculopathy [M54.16]     Postoperative Diagnosis: Same as above.    The patient was observed until discharge criteria met. Discharge instructions were given and patient was released to a responsible adult.         Complications: None.     Follow up: The patient was followed in the pain clinic as needed basis.         Teo Lim

## 2021-06-16 ENCOUNTER — OFFICE VISIT (OUTPATIENT)
Dept: PAIN CLINIC | Facility: CLINIC | Age: 46
End: 2021-06-16

## 2021-06-16 VITALS
HEART RATE: 68 BPM | DIASTOLIC BLOOD PRESSURE: 60 MMHG | SYSTOLIC BLOOD PRESSURE: 110 MMHG | RESPIRATION RATE: 16 BRPM | WEIGHT: 167 LBS | BODY MASS INDEX: 27 KG/M2

## 2021-06-16 DIAGNOSIS — M54.16 LUMBAR RADICULOPATHY: Primary | ICD-10-CM

## 2021-06-16 PROCEDURE — 3078F DIAST BP <80 MM HG: CPT | Performed by: ANESTHESIOLOGY

## 2021-06-16 PROCEDURE — 99213 OFFICE O/P EST LOW 20 MIN: CPT | Performed by: ANESTHESIOLOGY

## 2021-06-16 PROCEDURE — 3074F SYST BP LT 130 MM HG: CPT | Performed by: ANESTHESIOLOGY

## 2021-06-16 NOTE — PROGRESS NOTES
Name: Jordon Fonseca   : 1975   DOS: 2021     Pain Clinic Follow Up Visit:   Patient presents with:  Procedure Follow Up: LEFT LUMBAR 5 TRANSFORAMINAL EPIDURAL STEROID INJECTION SINGLE LEVEL      Coleman Thomas is a 39 yea follow-up on as-needed basis.   Isabel Singh MD, 6/16/2021, 10:14 AM

## 2021-09-07 ENCOUNTER — TELEPHONE (OUTPATIENT)
Dept: FAMILY MEDICINE CLINIC | Facility: CLINIC | Age: 46
End: 2021-09-07

## 2021-09-07 DIAGNOSIS — Z97.5 IUD CONTRACEPTION: ICD-10-CM

## 2021-09-07 DIAGNOSIS — Z30.09 COUNSELING FOR BIRTH CONTROL REGARDING INTRAUTERINE DEVICE (IUD): Primary | ICD-10-CM

## 2021-09-08 NOTE — TELEPHONE ENCOUNTER
Referral for Mirena removal and reinsertion     Mirena Levonorgestrel -releasing intrauterine contraceptive system 52mgs.    [facility] code   CPT code 64114  Dx Code A02.448    Office notes from Dr. Maximilian Rosenberg, DO 2/19/21  Past Medical History:   Patrice Greenberg

## 2021-09-08 NOTE — TELEPHONE ENCOUNTER
Ward Gallardo please process a Referral for Mirena removal and reinsertion    Mirena Levonorgestrel -releasing intrauterine contraceptive system 52mgs.    [facility] code [id number]  CPT code 64876  Dx Code P49.553

## 2021-09-10 NOTE — TELEPHONE ENCOUNTER
[facility] staff, please let patient know the IUD has been approved by her insurance and she can move forward with scheduling an appt    Mirena will be ordered today

## 2021-10-08 ENCOUNTER — OFFICE VISIT (OUTPATIENT)
Dept: FAMILY MEDICINE CLINIC | Facility: CLINIC | Age: 46
End: 2021-10-08

## 2021-10-08 VITALS
DIASTOLIC BLOOD PRESSURE: 74 MMHG | WEIGHT: 166.19 LBS | HEART RATE: 76 BPM | RESPIRATION RATE: 16 BRPM | HEIGHT: 66.5 IN | TEMPERATURE: 98 F | SYSTOLIC BLOOD PRESSURE: 108 MMHG | BODY MASS INDEX: 26.39 KG/M2

## 2021-10-08 DIAGNOSIS — Z11.3 ROUTINE SCREENING FOR STI (SEXUALLY TRANSMITTED INFECTION): ICD-10-CM

## 2021-10-08 DIAGNOSIS — Z12.11 ENCOUNTER FOR SCREENING COLONOSCOPY: ICD-10-CM

## 2021-10-08 DIAGNOSIS — Z00.00 WELL WOMAN EXAM WITHOUT GYNECOLOGICAL EXAM: Primary | ICD-10-CM

## 2021-10-08 DIAGNOSIS — Z30.430 ENCOUNTER FOR IUD INSERTION: ICD-10-CM

## 2021-10-08 PROBLEM — Z97.5 IUD (INTRAUTERINE DEVICE) IN PLACE: Status: ACTIVE | Noted: 2021-10-08

## 2021-10-08 PROCEDURE — 90686 IIV4 VACC NO PRSV 0.5 ML IM: CPT | Performed by: FAMILY MEDICINE

## 2021-10-08 PROCEDURE — 90471 IMMUNIZATION ADMIN: CPT | Performed by: FAMILY MEDICINE

## 2021-10-08 PROCEDURE — 99396 PREV VISIT EST AGE 40-64: CPT | Performed by: FAMILY MEDICINE

## 2021-10-08 PROCEDURE — 58301 REMOVE INTRAUTERINE DEVICE: CPT | Performed by: FAMILY MEDICINE

## 2021-10-08 PROCEDURE — 3008F BODY MASS INDEX DOCD: CPT | Performed by: FAMILY MEDICINE

## 2021-10-08 PROCEDURE — 3078F DIAST BP <80 MM HG: CPT | Performed by: FAMILY MEDICINE

## 2021-10-08 PROCEDURE — 3074F SYST BP LT 130 MM HG: CPT | Performed by: FAMILY MEDICINE

## 2021-10-08 PROCEDURE — 58300 INSERT INTRAUTERINE DEVICE: CPT | Performed by: FAMILY MEDICINE

## 2021-10-08 PROCEDURE — 81025 URINE PREGNANCY TEST: CPT | Performed by: FAMILY MEDICINE

## 2021-10-08 NOTE — PROGRESS NOTES
SUBJECTIVE:  Patient presents with: Well Adult: Annual physical  Contraception: IUD insertion  Immunization/Injection: Flu shot    HPI:  No concerns today. Health Maintenance:  Vaccines: reviewed as below.  Indicated today:Influenza    Immunization Hi colonoscopy: 2015- do not have report  Breast Cancer screening:  Last mammogram: 2020  STI: Desires testing for STIs? yes  Tobacco use:  reports that she has never smoked.  She has never used smokeless tobacco.    ROS:   Review of Systems    HISTORY:  Past José Perry is a 39year old female is here for Well Adult (Annual physical), Contraception (IUD insertion), and Immunization/Injection (Flu shot)    Problem List Items Addressed This Visit     None      Visit Diagnoses     Well woman exam without gyne Future    Encounter for IUD insertion  -     URINE PREGNANCY TEST    Procedure note- IUD removal and insertion   Risks of the insertion procedure, including but not limited to cramping, displaced threads, ectopic pregnancy, embedment or fragmentation of IU

## 2021-10-11 ENCOUNTER — TELEPHONE (OUTPATIENT)
Dept: FAMILY MEDICINE CLINIC | Facility: CLINIC | Age: 46
End: 2021-10-11

## 2021-10-11 NOTE — TELEPHONE ENCOUNTER
Received Mirena card from Dr. Juan Guerrero. LM to advise pt she is able to  in office or it can be mailed. Card placed in front drawer.

## 2021-11-11 ENCOUNTER — LAB ENCOUNTER (OUTPATIENT)
Dept: LAB | Age: 46
End: 2021-11-11
Attending: FAMILY MEDICINE
Payer: COMMERCIAL

## 2021-11-11 DIAGNOSIS — Z00.00 WELL WOMAN EXAM WITHOUT GYNECOLOGICAL EXAM: ICD-10-CM

## 2021-11-11 DIAGNOSIS — Z11.3 ROUTINE SCREENING FOR STI (SEXUALLY TRANSMITTED INFECTION): ICD-10-CM

## 2021-11-11 PROCEDURE — 36415 COLL VENOUS BLD VENIPUNCTURE: CPT

## 2021-11-11 PROCEDURE — 87491 CHLMYD TRACH DNA AMP PROBE: CPT

## 2021-11-11 PROCEDURE — 87389 HIV-1 AG W/HIV-1&-2 AB AG IA: CPT

## 2021-11-11 PROCEDURE — 84443 ASSAY THYROID STIM HORMONE: CPT

## 2021-11-11 PROCEDURE — 80061 LIPID PANEL: CPT

## 2021-11-11 PROCEDURE — 85025 COMPLETE CBC W/AUTO DIFF WBC: CPT

## 2021-11-11 PROCEDURE — 87591 N.GONORRHOEAE DNA AMP PROB: CPT

## 2021-11-11 PROCEDURE — 80053 COMPREHEN METABOLIC PANEL: CPT

## 2021-11-11 PROCEDURE — 86780 TREPONEMA PALLIDUM: CPT

## 2021-11-12 ENCOUNTER — OFFICE VISIT (OUTPATIENT)
Dept: FAMILY MEDICINE CLINIC | Facility: CLINIC | Age: 46
End: 2021-11-12

## 2021-11-12 VITALS
DIASTOLIC BLOOD PRESSURE: 78 MMHG | WEIGHT: 170 LBS | SYSTOLIC BLOOD PRESSURE: 112 MMHG | TEMPERATURE: 97 F | RESPIRATION RATE: 16 BRPM | BODY MASS INDEX: 27.32 KG/M2 | HEART RATE: 68 BPM | HEIGHT: 66 IN

## 2021-11-12 DIAGNOSIS — J45.20 MILD INTERMITTENT ASTHMA WITHOUT COMPLICATION: Primary | ICD-10-CM

## 2021-11-12 DIAGNOSIS — Z30.431 IUD CHECK UP: ICD-10-CM

## 2021-11-12 PROCEDURE — 3008F BODY MASS INDEX DOCD: CPT | Performed by: FAMILY MEDICINE

## 2021-11-12 PROCEDURE — 3078F DIAST BP <80 MM HG: CPT | Performed by: FAMILY MEDICINE

## 2021-11-12 PROCEDURE — 99213 OFFICE O/P EST LOW 20 MIN: CPT | Performed by: FAMILY MEDICINE

## 2021-11-12 PROCEDURE — 3074F SYST BP LT 130 MM HG: CPT | Performed by: FAMILY MEDICINE

## 2021-11-12 RX ORDER — ALBUTEROL SULFATE 90 UG/1
2 AEROSOL, METERED RESPIRATORY (INHALATION) EVERY 6 HOURS PRN
Qty: 18 G | Refills: 2 | Status: SHIPPED | OUTPATIENT
Start: 2021-11-12

## 2021-11-12 NOTE — PROGRESS NOTES
Chief Complaint:  Patient presents with:  Test Results: F/u with labs completed 11/11/2021  Asthma: ACT due, refill for new inhaler    HPI:  This is a 55year old female patient presenting for Test Results (F/u with labs completed 11/11/2021) and Asthma (A Capsule Delayed Release TAKE ONE CAPSULE BY MOUTH EVERY DAY (Patient taking differently: Taking as needed) 30 capsule 0   • Multiple Vitamins-Minerals (MULTIVITAMIN OR) Take 2 tablets by mouth daily.  (Patient not taking: No sig reported)       Allergies:

## 2021-12-17 ENCOUNTER — HOSPITAL ENCOUNTER (OUTPATIENT)
Dept: MAMMOGRAPHY | Facility: HOSPITAL | Age: 46
Discharge: HOME | End: 2021-12-17
Attending: FAMILY MEDICINE
Payer: COMMERCIAL

## 2021-12-17 DIAGNOSIS — Z00.00 WELL WOMAN EXAM WITHOUT GYNECOLOGICAL EXAM: ICD-10-CM

## 2021-12-17 PROCEDURE — 77067 SCR MAMMO BI INCL CAD: CPT | Performed by: FAMILY MEDICINE

## 2021-12-17 PROCEDURE — 77063 BREAST TOMOSYNTHESIS BI: CPT | Performed by: FAMILY MEDICINE

## 2022-02-14 NOTE — PROGRESS NOTES
Last procedure: LEFT LUMBAR 5 TRANSFORAMINAL EPIDURAL STEROID INJECTION SINGLE LEVEL  Date: 06/01/2021  Percentage of relief obtained: 85%  Duration of relief: 6/8/2021    Current Pain Score: 3/10 Awake/Alert

## 2022-03-31 ENCOUNTER — HOSPITAL ENCOUNTER (EMERGENCY)
Facility: HOSPITAL | Age: 47
Discharge: HOME | End: 2022-03-31
Attending: STUDENT IN AN ORGANIZED HEALTH CARE EDUCATION/TRAINING PROGRAM
Payer: COMMERCIAL

## 2022-03-31 ENCOUNTER — PATIENT OUTREACH (OUTPATIENT)
Dept: CASE MANAGEMENT | Age: 47
End: 2022-03-31

## 2022-03-31 VITALS
HEIGHT: 66 IN | RESPIRATION RATE: 20 BRPM | OXYGEN SATURATION: 99 % | SYSTOLIC BLOOD PRESSURE: 121 MMHG | TEMPERATURE: 98 F | WEIGHT: 154 LBS | DIASTOLIC BLOOD PRESSURE: 88 MMHG | HEART RATE: 62 BPM | BODY MASS INDEX: 24.75 KG/M2

## 2022-03-31 DIAGNOSIS — K64.9 HEMORRHOIDS, UNSPECIFIED HEMORRHOID TYPE: Primary | ICD-10-CM

## 2022-03-31 DIAGNOSIS — R19.5 MUCUS IN STOOL: ICD-10-CM

## 2022-03-31 PROCEDURE — 87177 OVA AND PARASITES SMEARS: CPT | Performed by: STUDENT IN AN ORGANIZED HEALTH CARE EDUCATION/TRAINING PROGRAM

## 2022-03-31 PROCEDURE — 99282 EMERGENCY DEPT VISIT SF MDM: CPT

## 2022-03-31 PROCEDURE — 82272 OCCULT BLD FECES 1-3 TESTS: CPT | Performed by: STUDENT IN AN ORGANIZED HEALTH CARE EDUCATION/TRAINING PROGRAM

## 2022-03-31 PROCEDURE — 87209 SMEAR COMPLEX STAIN: CPT | Performed by: STUDENT IN AN ORGANIZED HEALTH CARE EDUCATION/TRAINING PROGRAM

## 2022-03-31 NOTE — PROGRESS NOTES
1st Attempt at scheduling         Jess Doherty MD  [address] [address] [facility] [phone]      Unable to contact     Unable to leave voice mail due to box being full

## 2022-03-31 NOTE — ED INITIAL ASSESSMENT (HPI)
Pt states that 20 minutes ago she saw a transparent with mucus appearance on her stools. Denies abd pain. Hat itching sensation to rectal area.  No bleeding

## 2022-04-01 NOTE — PROGRESS NOTES
2nd Attempt at scheduling         Kendra Reyez MD  [address] [place] [address] [address] [facility] [phone]      Unable to contact     Unable to leave voice mail due to box being full

## 2022-04-04 NOTE — PROGRESS NOTES
3rd Attempt at scheduling         Sherri Meyers MD  [address] [facility] [phone]      Unable to contact     Was able to leave voice mail for call back

## 2022-08-19 ENCOUNTER — TELEPHONE (OUTPATIENT)
Dept: FAMILY MEDICINE CLINIC | Facility: CLINIC | Age: 47
End: 2022-08-19

## 2022-08-19 NOTE — TELEPHONE ENCOUNTER
Patient called wants to make sure she's up to date with her covid vaccines.  Patient has had a total of 3 all done last year please call

## 2022-10-07 PROBLEM — Z12.11 SPECIAL SCREENING FOR MALIGNANT NEOPLASM OF COLON: Status: ACTIVE | Noted: 2022-10-07

## 2022-10-14 ENCOUNTER — OFFICE VISIT (OUTPATIENT)
Dept: FAMILY MEDICINE CLINIC | Facility: CLINIC | Age: 47
End: 2022-10-14
Payer: COMMERCIAL

## 2022-10-14 VITALS
TEMPERATURE: 97 F | HEIGHT: 66 IN | RESPIRATION RATE: 16 BRPM | HEART RATE: 58 BPM | OXYGEN SATURATION: 100 % | SYSTOLIC BLOOD PRESSURE: 118 MMHG | WEIGHT: 151 LBS | DIASTOLIC BLOOD PRESSURE: 68 MMHG | BODY MASS INDEX: 24.27 KG/M2

## 2022-10-14 DIAGNOSIS — J45.20 MILD INTERMITTENT ASTHMA WITHOUT COMPLICATION: ICD-10-CM

## 2022-10-14 DIAGNOSIS — Z12.4 SCREENING FOR CERVICAL CANCER: ICD-10-CM

## 2022-10-14 DIAGNOSIS — Z12.31 VISIT FOR SCREENING MAMMOGRAM: ICD-10-CM

## 2022-10-14 DIAGNOSIS — Z01.419 WELL WOMAN EXAM WITH ROUTINE GYNECOLOGICAL EXAM: Primary | ICD-10-CM

## 2022-10-14 DIAGNOSIS — Z11.3 ROUTINE SCREENING FOR STI (SEXUALLY TRANSMITTED INFECTION): ICD-10-CM

## 2022-10-14 LAB
ALBUMIN SERPL-MCNC: 3.8 G/DL (ref 3.4–5)
ALBUMIN/GLOB SERPL: 1.3 {RATIO} (ref 1–2)
ALP LIVER SERPL-CCNC: 94 U/L
ALT SERPL-CCNC: 183 U/L
ANION GAP SERPL CALC-SCNC: 3 MMOL/L (ref 0–18)
AST SERPL-CCNC: 64 U/L (ref 15–37)
BASOPHILS # BLD AUTO: 0.04 X10(3) UL (ref 0–0.2)
BASOPHILS NFR BLD AUTO: 0.9 %
BILIRUB SERPL-MCNC: 1.4 MG/DL (ref 0.1–2)
BUN BLD-MCNC: 17 MG/DL (ref 7–18)
CALCIUM BLD-MCNC: 9.2 MG/DL (ref 8.5–10.1)
CHLORIDE SERPL-SCNC: 112 MMOL/L (ref 98–112)
CHOLEST SERPL-MCNC: 147 MG/DL (ref ?–200)
CO2 SERPL-SCNC: 27 MMOL/L (ref 21–32)
CREAT BLD-MCNC: 0.97 MG/DL
EOSINOPHIL # BLD AUTO: 0.15 X10(3) UL (ref 0–0.7)
EOSINOPHIL NFR BLD AUTO: 3.3 %
ERYTHROCYTE [DISTWIDTH] IN BLOOD BY AUTOMATED COUNT: 14.1 %
FASTING PATIENT LIPID ANSWER: YES
FASTING STATUS PATIENT QL REPORTED: YES
GFR SERPLBLD BASED ON 1.73 SQ M-ARVRAT: 73 ML/MIN/1.73M2 (ref 60–?)
GLOBULIN PLAS-MCNC: 3 G/DL (ref 2.8–4.4)
GLUCOSE BLD-MCNC: 95 MG/DL (ref 70–99)
HCT VFR BLD AUTO: 39.2 %
HDLC SERPL-MCNC: 62 MG/DL (ref 40–59)
HGB BLD-MCNC: 12.4 G/DL
IMM GRANULOCYTES # BLD AUTO: 0.01 X10(3) UL (ref 0–1)
IMM GRANULOCYTES NFR BLD: 0.2 %
LDLC SERPL CALC-MCNC: 75 MG/DL (ref ?–100)
LYMPHOCYTES # BLD AUTO: 1.41 X10(3) UL (ref 1–4)
LYMPHOCYTES NFR BLD AUTO: 31.4 %
MCH RBC QN AUTO: 27.3 PG (ref 26–34)
MCHC RBC AUTO-ENTMCNC: 31.6 G/DL (ref 31–37)
MCV RBC AUTO: 86.2 FL
MONOCYTES # BLD AUTO: 0.29 X10(3) UL (ref 0.1–1)
MONOCYTES NFR BLD AUTO: 6.5 %
NEUTROPHILS # BLD AUTO: 2.59 X10 (3) UL (ref 1.5–7.7)
NEUTROPHILS # BLD AUTO: 2.59 X10(3) UL (ref 1.5–7.7)
NEUTROPHILS NFR BLD AUTO: 57.7 %
NONHDLC SERPL-MCNC: 85 MG/DL (ref ?–130)
OSMOLALITY SERPL CALC.SUM OF ELEC: 295 MOSM/KG (ref 275–295)
PLATELET # BLD AUTO: 148 10(3)UL (ref 150–450)
POTASSIUM SERPL-SCNC: 4.1 MMOL/L (ref 3.5–5.1)
PROT SERPL-MCNC: 6.8 G/DL (ref 6.4–8.2)
RBC # BLD AUTO: 4.55 X10(6)UL
SODIUM SERPL-SCNC: 142 MMOL/L (ref 136–145)
T PALLIDUM AB SER QL IA: NONREACTIVE
TRIGL SERPL-MCNC: 44 MG/DL (ref 30–149)
TSI SER-ACNC: 0.49 MIU/ML (ref 0.36–3.74)
VLDLC SERPL CALC-MCNC: 7 MG/DL (ref 0–30)
WBC # BLD AUTO: 4.5 X10(3) UL (ref 4–11)

## 2022-10-14 PROCEDURE — 3074F SYST BP LT 130 MM HG: CPT | Performed by: FAMILY MEDICINE

## 2022-10-14 PROCEDURE — 85025 COMPLETE CBC W/AUTO DIFF WBC: CPT | Performed by: FAMILY MEDICINE

## 2022-10-14 PROCEDURE — 90686 IIV4 VACC NO PRSV 0.5 ML IM: CPT | Performed by: FAMILY MEDICINE

## 2022-10-14 PROCEDURE — 87624 HPV HI-RISK TYP POOLED RSLT: CPT | Performed by: FAMILY MEDICINE

## 2022-10-14 PROCEDURE — 87591 N.GONORRHOEAE DNA AMP PROB: CPT | Performed by: FAMILY MEDICINE

## 2022-10-14 PROCEDURE — 87389 HIV-1 AG W/HIV-1&-2 AB AG IA: CPT | Performed by: FAMILY MEDICINE

## 2022-10-14 PROCEDURE — 84443 ASSAY THYROID STIM HORMONE: CPT | Performed by: FAMILY MEDICINE

## 2022-10-14 PROCEDURE — 99396 PREV VISIT EST AGE 40-64: CPT | Performed by: FAMILY MEDICINE

## 2022-10-14 PROCEDURE — 90471 IMMUNIZATION ADMIN: CPT | Performed by: FAMILY MEDICINE

## 2022-10-14 PROCEDURE — 80061 LIPID PANEL: CPT | Performed by: FAMILY MEDICINE

## 2022-10-14 PROCEDURE — 3008F BODY MASS INDEX DOCD: CPT | Performed by: FAMILY MEDICINE

## 2022-10-14 PROCEDURE — 80053 COMPREHEN METABOLIC PANEL: CPT | Performed by: FAMILY MEDICINE

## 2022-10-14 PROCEDURE — 87491 CHLMYD TRACH DNA AMP PROBE: CPT | Performed by: FAMILY MEDICINE

## 2022-10-14 PROCEDURE — 3078F DIAST BP <80 MM HG: CPT | Performed by: FAMILY MEDICINE

## 2022-10-14 PROCEDURE — 86780 TREPONEMA PALLIDUM: CPT | Performed by: FAMILY MEDICINE

## 2022-10-14 RX ORDER — ALBUTEROL SULFATE 90 UG/1
2 AEROSOL, METERED RESPIRATORY (INHALATION) EVERY 6 HOURS PRN
Qty: 18 G | Refills: 2 | Status: SHIPPED | OUTPATIENT
Start: 2022-10-14

## 2022-10-17 ENCOUNTER — PATIENT MESSAGE (OUTPATIENT)
Dept: FAMILY MEDICINE CLINIC | Facility: CLINIC | Age: 47
End: 2022-10-17

## 2022-10-17 DIAGNOSIS — R79.89 ELEVATED LFTS: Primary | ICD-10-CM

## 2022-10-17 LAB
C TRACH DNA SPEC QL NAA+PROBE: NEGATIVE
HPV I/H RISK 1 DNA SPEC QL NAA+PROBE: NEGATIVE
N GONORRHOEA DNA SPEC QL NAA+PROBE: NEGATIVE

## 2022-10-18 NOTE — TELEPHONE ENCOUNTER
From: Wu Gastelum  To: Eve Huizar DO  Sent: 10/17/2022 8:42 PM CDT  Subject: AST/alt levels     Could you explain what my AST and ALT levels mean as far as the results that I received?

## 2022-11-03 ENCOUNTER — OFFICE VISIT (OUTPATIENT)
Dept: FAMILY MEDICINE CLINIC | Facility: CLINIC | Age: 47
End: 2022-11-03
Payer: COMMERCIAL

## 2022-11-03 ENCOUNTER — LAB ENCOUNTER (OUTPATIENT)
Dept: LAB | Facility: HOSPITAL | Age: 47
End: 2022-11-03
Attending: FAMILY MEDICINE
Payer: COMMERCIAL

## 2022-11-03 VITALS
SYSTOLIC BLOOD PRESSURE: 118 MMHG | TEMPERATURE: 97 F | HEART RATE: 88 BPM | OXYGEN SATURATION: 99 % | HEIGHT: 66 IN | BODY MASS INDEX: 23.78 KG/M2 | DIASTOLIC BLOOD PRESSURE: 70 MMHG | WEIGHT: 148 LBS | RESPIRATION RATE: 16 BRPM

## 2022-11-03 DIAGNOSIS — D69.6 THROMBOCYTOPENIA (HCC): ICD-10-CM

## 2022-11-03 DIAGNOSIS — R79.89 ELEVATED LFTS: Primary | ICD-10-CM

## 2022-11-03 LAB
ALBUMIN SERPL-MCNC: 4.3 G/DL (ref 3.4–5)
ALP LIVER SERPL-CCNC: 91 U/L
ALT SERPL-CCNC: 49 U/L
AST SERPL-CCNC: 20 U/L (ref 15–37)
BILIRUB DIRECT SERPL-MCNC: 0.3 MG/DL (ref 0–0.2)
BILIRUB SERPL-MCNC: 1.6 MG/DL (ref 0.1–2)
ERYTHROCYTE [DISTWIDTH] IN BLOOD BY AUTOMATED COUNT: 14 %
HBV SURFACE AB SER QL: REACTIVE
HBV SURFACE AB SERPL IA-ACNC: 44.35 MIU/ML
HBV SURFACE AG SER-ACNC: <0.1 [IU]/L
HBV SURFACE AG SERPL QL IA: NONREACTIVE
HCT VFR BLD AUTO: 42.9 %
HCV AB SERPL QL IA: NONREACTIVE
HGB BLD-MCNC: 13.8 G/DL
IRON SATN MFR SERPL: 32 %
IRON SERPL-MCNC: 144 UG/DL
MCH RBC QN AUTO: 27.4 PG (ref 26–34)
MCHC RBC AUTO-ENTMCNC: 32.2 G/DL (ref 31–37)
MCV RBC AUTO: 85.3 FL
PLATELET # BLD AUTO: 187 10(3)UL (ref 150–450)
PROT SERPL-MCNC: 8 G/DL (ref 6.4–8.2)
RBC # BLD AUTO: 5.03 X10(6)UL
TIBC SERPL-MCNC: 447 UG/DL (ref 240–450)
TRANSFERRIN SERPL-MCNC: 300 MG/DL (ref 200–360)
WBC # BLD AUTO: 5.3 X10(3) UL (ref 4–11)

## 2022-11-03 PROCEDURE — 3074F SYST BP LT 130 MM HG: CPT | Performed by: FAMILY MEDICINE

## 2022-11-03 PROCEDURE — 86706 HEP B SURFACE ANTIBODY: CPT

## 2022-11-03 PROCEDURE — 86225 DNA ANTIBODY NATIVE: CPT

## 2022-11-03 PROCEDURE — 80076 HEPATIC FUNCTION PANEL: CPT

## 2022-11-03 PROCEDURE — 83540 ASSAY OF IRON: CPT

## 2022-11-03 PROCEDURE — 36415 COLL VENOUS BLD VENIPUNCTURE: CPT

## 2022-11-03 PROCEDURE — 3008F BODY MASS INDEX DOCD: CPT | Performed by: FAMILY MEDICINE

## 2022-11-03 PROCEDURE — 3078F DIAST BP <80 MM HG: CPT | Performed by: FAMILY MEDICINE

## 2022-11-03 PROCEDURE — 99214 OFFICE O/P EST MOD 30 MIN: CPT | Performed by: FAMILY MEDICINE

## 2022-11-03 PROCEDURE — 86038 ANTINUCLEAR ANTIBODIES: CPT

## 2022-11-03 PROCEDURE — 85027 COMPLETE CBC AUTOMATED: CPT

## 2022-11-03 PROCEDURE — 86803 HEPATITIS C AB TEST: CPT

## 2022-11-03 PROCEDURE — 87340 HEPATITIS B SURFACE AG IA: CPT

## 2022-11-03 PROCEDURE — 83550 IRON BINDING TEST: CPT

## 2022-11-04 LAB
DSDNA IGG SERPL IA-ACNC: 0.8 IU/ML
ENA AB SER QL IA: 0.1 UG/L
ENA AB SER QL IA: NEGATIVE

## 2022-11-15 ENCOUNTER — HOSPITAL ENCOUNTER (OUTPATIENT)
Dept: ULTRASOUND IMAGING | Age: 47
Discharge: HOME | End: 2022-11-15
Attending: FAMILY MEDICINE
Payer: COMMERCIAL

## 2022-11-15 DIAGNOSIS — R79.89 ELEVATED LFTS: ICD-10-CM

## 2022-11-15 PROCEDURE — 76700 US EXAM ABDOM COMPLETE: CPT | Performed by: FAMILY MEDICINE

## 2022-12-19 ENCOUNTER — HOSPITAL ENCOUNTER (OUTPATIENT)
Dept: MAMMOGRAPHY | Facility: HOSPITAL | Age: 47
Discharge: HOME | End: 2022-12-19
Attending: FAMILY MEDICINE
Payer: COMMERCIAL

## 2022-12-19 DIAGNOSIS — Z12.31 VISIT FOR SCREENING MAMMOGRAM: ICD-10-CM

## 2022-12-19 PROCEDURE — 77067 SCR MAMMO BI INCL CAD: CPT | Performed by: FAMILY MEDICINE

## 2022-12-19 PROCEDURE — 77063 BREAST TOMOSYNTHESIS BI: CPT | Performed by: FAMILY MEDICINE

## 2023-07-20 ENCOUNTER — OFFICE VISIT (OUTPATIENT)
Dept: FAMILY MEDICINE CLINIC | Facility: CLINIC | Age: 48
End: 2023-07-20
Payer: COMMERCIAL

## 2023-07-20 VITALS
SYSTOLIC BLOOD PRESSURE: 108 MMHG | TEMPERATURE: 99 F | BODY MASS INDEX: 24.75 KG/M2 | HEART RATE: 64 BPM | WEIGHT: 150.38 LBS | RESPIRATION RATE: 16 BRPM | DIASTOLIC BLOOD PRESSURE: 70 MMHG | HEIGHT: 65.5 IN

## 2023-07-20 DIAGNOSIS — N89.8 VAGINAL DISCHARGE: Primary | ICD-10-CM

## 2023-07-20 PROCEDURE — 87510 GARDNER VAG DNA DIR PROBE: CPT | Performed by: PHYSICIAN ASSISTANT

## 2023-07-20 PROCEDURE — 3078F DIAST BP <80 MM HG: CPT | Performed by: PHYSICIAN ASSISTANT

## 2023-07-20 PROCEDURE — 87660 TRICHOMONAS VAGIN DIR PROBE: CPT | Performed by: PHYSICIAN ASSISTANT

## 2023-07-20 PROCEDURE — 99213 OFFICE O/P EST LOW 20 MIN: CPT | Performed by: PHYSICIAN ASSISTANT

## 2023-07-20 PROCEDURE — 87591 N.GONORRHOEAE DNA AMP PROB: CPT | Performed by: PHYSICIAN ASSISTANT

## 2023-07-20 PROCEDURE — 87491 CHLMYD TRACH DNA AMP PROBE: CPT | Performed by: PHYSICIAN ASSISTANT

## 2023-07-20 PROCEDURE — 3074F SYST BP LT 130 MM HG: CPT | Performed by: PHYSICIAN ASSISTANT

## 2023-07-20 PROCEDURE — 87480 CANDIDA DNA DIR PROBE: CPT | Performed by: PHYSICIAN ASSISTANT

## 2023-07-20 PROCEDURE — 3008F BODY MASS INDEX DOCD: CPT | Performed by: PHYSICIAN ASSISTANT

## 2023-07-21 LAB
C TRACH DNA SPEC QL NAA+PROBE: NEGATIVE
N GONORRHOEA DNA SPEC QL NAA+PROBE: NEGATIVE

## 2023-08-01 ENCOUNTER — TELEPHONE (OUTPATIENT)
Dept: FAMILY MEDICINE CLINIC | Facility: CLINIC | Age: 48
End: 2023-08-01

## 2023-08-01 NOTE — TELEPHONE ENCOUNTER
Pt call because is Covid positive with , fever, mucus, headache , little warm, pt request a nurse to call back for Covid instructions.

## 2023-08-03 NOTE — PATIENT INSTRUCTIONS
Take Naproxen, one tab twice daily until all tabs are gone. Space doses 12 hours apart for best effect. TAKE DOSES WITH FOOD. Do not take any Advil, Motrin, Aleve or ibuprofen products while taking this medication.       It is ok to take aceta Undermining Type: Entire Wound

## 2023-10-09 ENCOUNTER — TELEPHONE (OUTPATIENT)
Dept: FAMILY MEDICINE CLINIC | Facility: CLINIC | Age: 48
End: 2023-10-09

## 2023-10-09 DIAGNOSIS — E07.9 THYROID DISORDER: ICD-10-CM

## 2023-10-09 DIAGNOSIS — Z13.1 SCREENING FOR DIABETES MELLITUS: ICD-10-CM

## 2023-10-09 DIAGNOSIS — Z13.0 SCREENING FOR IRON DEFICIENCY ANEMIA: ICD-10-CM

## 2023-10-09 DIAGNOSIS — Z13.6 SCREENING FOR CARDIOVASCULAR CONDITION: ICD-10-CM

## 2023-10-09 DIAGNOSIS — Z12.31 VISIT FOR SCREENING MAMMOGRAM: Primary | ICD-10-CM

## 2023-10-09 DIAGNOSIS — Z00.00 LABORATORY EXAMINATION ORDERED AS PART OF A ROUTINE GENERAL MEDICAL EXAMINATION: ICD-10-CM

## 2023-10-09 DIAGNOSIS — Z13.29 SCREENING FOR THYROID DISORDER: ICD-10-CM

## 2023-10-09 NOTE — TELEPHONE ENCOUNTER
Please enter lab orders for the patient's upcoming physical appointment. Physical scheduled: Your appointments       Date & Time Appointment Department [facility])    Oct 16, 2023  9:00 AM CDT Physical - Established with Sarah Fitzpatrick DO [facility], [address], [place] ([address])              [address] [place],[address], [address], [place] [address] [place] [address] [phone]           [facility])     The patient has been notified to complete fasting labs prior to their physical appointment.

## 2023-10-16 ENCOUNTER — OFFICE VISIT (OUTPATIENT)
Dept: FAMILY MEDICINE CLINIC | Facility: CLINIC | Age: 48
End: 2023-10-16
Payer: COMMERCIAL

## 2023-10-16 VITALS
BODY MASS INDEX: 25.7 KG/M2 | HEIGHT: 65.75 IN | HEART RATE: 54 BPM | SYSTOLIC BLOOD PRESSURE: 122 MMHG | OXYGEN SATURATION: 100 % | DIASTOLIC BLOOD PRESSURE: 64 MMHG | WEIGHT: 158 LBS | RESPIRATION RATE: 16 BRPM

## 2023-10-16 DIAGNOSIS — Z00.00 WELL WOMAN EXAM WITHOUT GYNECOLOGICAL EXAM: Primary | ICD-10-CM

## 2023-10-16 DIAGNOSIS — Z13.29 SCREENING FOR THYROID DISORDER: ICD-10-CM

## 2023-10-16 DIAGNOSIS — Z00.00 LABORATORY EXAMINATION ORDERED AS PART OF A ROUTINE GENERAL MEDICAL EXAMINATION: ICD-10-CM

## 2023-10-16 DIAGNOSIS — E07.9 THYROID DISORDER: ICD-10-CM

## 2023-10-16 DIAGNOSIS — Z13.1 SCREENING FOR DIABETES MELLITUS: ICD-10-CM

## 2023-10-16 DIAGNOSIS — Z13.6 SCREENING FOR CARDIOVASCULAR CONDITION: ICD-10-CM

## 2023-10-16 DIAGNOSIS — Z13.0 SCREENING FOR IRON DEFICIENCY ANEMIA: ICD-10-CM

## 2023-10-16 LAB
ALBUMIN SERPL-MCNC: 3.8 G/DL (ref 3.4–5)
ALBUMIN/GLOB SERPL: 1.2 {RATIO} (ref 1–2)
ALP LIVER SERPL-CCNC: 87 U/L
ALT SERPL-CCNC: 80 U/L
ANION GAP SERPL CALC-SCNC: 3 MMOL/L (ref 0–18)
AST SERPL-CCNC: 43 U/L (ref 15–37)
BASOPHILS # BLD AUTO: 0.06 X10(3) UL (ref 0–0.2)
BASOPHILS NFR BLD AUTO: 1.3 %
BILIRUB SERPL-MCNC: 1 MG/DL (ref 0.1–2)
BUN BLD-MCNC: 21 MG/DL (ref 7–18)
CALCIUM BLD-MCNC: 9.4 MG/DL (ref 8.5–10.1)
CHLORIDE SERPL-SCNC: 108 MMOL/L (ref 98–112)
CHOLEST SERPL-MCNC: 150 MG/DL (ref ?–200)
CO2 SERPL-SCNC: 28 MMOL/L (ref 21–32)
CREAT BLD-MCNC: 1.02 MG/DL
EGFRCR SERPLBLD CKD-EPI 2021: 68 ML/MIN/1.73M2 (ref 60–?)
EOSINOPHIL # BLD AUTO: 0.15 X10(3) UL (ref 0–0.7)
EOSINOPHIL NFR BLD AUTO: 3.2 %
ERYTHROCYTE [DISTWIDTH] IN BLOOD BY AUTOMATED COUNT: 14.2 %
FASTING PATIENT LIPID ANSWER: YES
FASTING STATUS PATIENT QL REPORTED: YES
GLOBULIN PLAS-MCNC: 3.2 G/DL (ref 2.8–4.4)
GLUCOSE BLD-MCNC: 87 MG/DL (ref 70–99)
HCT VFR BLD AUTO: 39 %
HDLC SERPL-MCNC: 74 MG/DL (ref 40–59)
HGB BLD-MCNC: 12.7 G/DL
IMM GRANULOCYTES # BLD AUTO: 0 X10(3) UL (ref 0–1)
IMM GRANULOCYTES NFR BLD: 0 %
LDLC SERPL CALC-MCNC: 68 MG/DL (ref ?–100)
LYMPHOCYTES # BLD AUTO: 1.35 X10(3) UL (ref 1–4)
LYMPHOCYTES NFR BLD AUTO: 29 %
MCH RBC QN AUTO: 26.7 PG (ref 26–34)
MCHC RBC AUTO-ENTMCNC: 32.6 G/DL (ref 31–37)
MCV RBC AUTO: 82.1 FL
MONOCYTES # BLD AUTO: 0.31 X10(3) UL (ref 0.1–1)
MONOCYTES NFR BLD AUTO: 6.7 %
NEUTROPHILS # BLD AUTO: 2.79 X10 (3) UL (ref 1.5–7.7)
NEUTROPHILS # BLD AUTO: 2.79 X10(3) UL (ref 1.5–7.7)
NEUTROPHILS NFR BLD AUTO: 59.8 %
NONHDLC SERPL-MCNC: 76 MG/DL (ref ?–130)
OSMOLALITY SERPL CALC.SUM OF ELEC: 290 MOSM/KG (ref 275–295)
PLATELET # BLD AUTO: 164 10(3)UL (ref 150–450)
POTASSIUM SERPL-SCNC: 4.1 MMOL/L (ref 3.5–5.1)
PROT SERPL-MCNC: 7 G/DL (ref 6.4–8.2)
RBC # BLD AUTO: 4.75 X10(6)UL
SODIUM SERPL-SCNC: 139 MMOL/L (ref 136–145)
TRIGL SERPL-MCNC: 33 MG/DL (ref 30–149)
TSI SER-ACNC: 0.88 MIU/ML (ref 0.36–3.74)
VLDLC SERPL CALC-MCNC: 5 MG/DL (ref 0–30)
WBC # BLD AUTO: 4.7 X10(3) UL (ref 4–11)

## 2023-10-16 PROCEDURE — 3074F SYST BP LT 130 MM HG: CPT | Performed by: FAMILY MEDICINE

## 2023-10-16 PROCEDURE — 84443 ASSAY THYROID STIM HORMONE: CPT | Performed by: FAMILY MEDICINE

## 2023-10-16 PROCEDURE — 90686 IIV4 VACC NO PRSV 0.5 ML IM: CPT | Performed by: FAMILY MEDICINE

## 2023-10-16 PROCEDURE — 85025 COMPLETE CBC W/AUTO DIFF WBC: CPT | Performed by: FAMILY MEDICINE

## 2023-10-16 PROCEDURE — 3078F DIAST BP <80 MM HG: CPT | Performed by: FAMILY MEDICINE

## 2023-10-16 PROCEDURE — 80061 LIPID PANEL: CPT | Performed by: FAMILY MEDICINE

## 2023-10-16 PROCEDURE — 3008F BODY MASS INDEX DOCD: CPT | Performed by: FAMILY MEDICINE

## 2023-10-16 PROCEDURE — 90471 IMMUNIZATION ADMIN: CPT | Performed by: FAMILY MEDICINE

## 2023-10-16 PROCEDURE — 80053 COMPREHEN METABOLIC PANEL: CPT | Performed by: FAMILY MEDICINE

## 2023-10-16 PROCEDURE — 99396 PREV VISIT EST AGE 40-64: CPT | Performed by: FAMILY MEDICINE

## 2023-10-17 DIAGNOSIS — R79.89 ELEVATED LFTS: Primary | ICD-10-CM

## 2023-11-09 ENCOUNTER — NURSE TRIAGE (OUTPATIENT)
Dept: FAMILY MEDICINE CLINIC | Facility: CLINIC | Age: 48
End: 2023-11-09

## 2023-11-09 NOTE — TELEPHONE ENCOUNTER
Answer Assessment - Initial Assessment Questions  1. DISCHARGE: \"Describe the discharge. \" (e.g., white, yellow, green, gray, foamy, cottage cheese-like)      Yes  2. ODOR: \"Is there a bad odor? \"      Uyes  3. ONSET:\"When did the discharge begin? \"  Yes  4. RASH: \"Is there a rash in that area? \" If Yes, ask: \"Describe it. \" (e.g., redness, blisters, sores, bumps)      f  5. ABDOMINAL PAIN: Gaile Merritt you having any abdominal pain? \" If Yes, ask: \"What does it feel like? \" (e.g., crampy, dull, intermittent, constant)       yovanny  6. ABDOMINAL PAIN SEVERITY: If present, ask: \"How bad is it? \"  (e.g., mild, moderate, severe)   - MILD - doesn't interfere with normal activities    - MODERATE - interferes with normal activities or awakens from sleep    - SEVERE - patient doesn't want to move (R/O peritonitis)       madina  7. CAUSE: \"What do you think is causing the discharge? \" \"Have you had the same problem before? What happened then? \"      madina  8. OTHER SYMPTOMS: \"Do you have any other symptoms? \" (e.g., fever, itching, vaginal bleeding, pain with urination, injury to genital area, vaginal foreign body)      Gianfranco;americo  9. PREGNANCY: \"Is there any chance you are pregnant? \" \"When was your last menstrual period? \"      hermilo    Protocols used: Vaginal Bqihinizg-O-HS

## 2023-11-14 NOTE — TELEPHONE ENCOUNTER
Pt has white, non-itchy vaginal discharge for one week. Pt has appt today to be assessed.     Future Appointments   Date Time Provider Dian Gilmore   11/14/2023 11:00 AM Marzetta Libman, MD EMG 3 EMG Bill   11/16/2023  3:30 PM BUDDY Monroy [facility] E[facility] SUB GI   12/20/2023 11:20 AM [facility] RM1 [address] [facility]

## 2023-11-15 ENCOUNTER — HOSPITAL ENCOUNTER (OUTPATIENT)
Age: 48
Discharge: HOME | End: 2023-11-15
Payer: COMMERCIAL

## 2023-11-15 VITALS
TEMPERATURE: 98 F | SYSTOLIC BLOOD PRESSURE: 121 MMHG | HEART RATE: 63 BPM | RESPIRATION RATE: 18 BRPM | OXYGEN SATURATION: 99 % | DIASTOLIC BLOOD PRESSURE: 85 MMHG

## 2023-11-15 DIAGNOSIS — N76.0 BACTERIAL VAGINITIS: ICD-10-CM

## 2023-11-15 DIAGNOSIS — B96.89 BACTERIAL VAGINITIS: ICD-10-CM

## 2023-11-15 DIAGNOSIS — N89.8 VAGINAL DISCHARGE: Primary | ICD-10-CM

## 2023-11-15 PROCEDURE — 99213 OFFICE O/P EST LOW 20 MIN: CPT | Performed by: PHYSICIAN ASSISTANT

## 2023-11-16 LAB
BV BACTERIA DNA VAG QL NAA+PROBE: POSITIVE
C GLABRATA DNA VAG QL NAA+PROBE: NEGATIVE
C KRUSEI DNA VAG QL NAA+PROBE: NEGATIVE
C TRACH DNA SPEC QL NAA+PROBE: NEGATIVE
CANDIDA DNA VAG QL NAA+PROBE: NEGATIVE
N GONORRHOEA DNA SPEC QL NAA+PROBE: NEGATIVE
T VAGINALIS DNA VAG QL NAA+PROBE: NEGATIVE

## 2023-11-16 RX ORDER — METRONIDAZOLE 500 MG/1
500 TABLET ORAL 2 TIMES DAILY
Qty: 14 TABLET | Refills: 0 | Status: SHIPPED | OUTPATIENT
Start: 2023-11-16 | End: 2023-11-23

## 2023-11-22 ENCOUNTER — OFFICE VISIT (OUTPATIENT)
Dept: FAMILY MEDICINE CLINIC | Facility: CLINIC | Age: 48
End: 2023-11-22
Payer: COMMERCIAL

## 2023-11-22 VITALS
RESPIRATION RATE: 18 BRPM | DIASTOLIC BLOOD PRESSURE: 80 MMHG | WEIGHT: 160.38 LBS | HEART RATE: 68 BPM | TEMPERATURE: 98 F | SYSTOLIC BLOOD PRESSURE: 106 MMHG | BODY MASS INDEX: 26 KG/M2

## 2023-11-22 DIAGNOSIS — J45.20 MILD INTERMITTENT ASTHMA WITHOUT COMPLICATION: ICD-10-CM

## 2023-11-22 PROCEDURE — 3074F SYST BP LT 130 MM HG: CPT | Performed by: PHYSICIAN ASSISTANT

## 2023-11-22 PROCEDURE — 3079F DIAST BP 80-89 MM HG: CPT | Performed by: PHYSICIAN ASSISTANT

## 2023-11-22 RX ORDER — ALBUTEROL SULFATE 90 UG/1
2 AEROSOL, METERED RESPIRATORY (INHALATION) EVERY 6 HOURS PRN
Qty: 18 G | Refills: 0 | Status: SHIPPED | OUTPATIENT
Start: 2023-11-22

## 2023-11-22 NOTE — PROGRESS NOTES
Pt seen at - positive for BV. Taking flagyl, avoid alcohol. Finishing abx prescription. Symptoms improved. tHought needed follow up here. No charge for visit.     Silver Hanley PA-C

## 2023-12-04 ENCOUNTER — OFFICE VISIT (OUTPATIENT)
Dept: FAMILY MEDICINE CLINIC | Facility: CLINIC | Age: 48
End: 2023-12-04
Payer: COMMERCIAL

## 2023-12-04 VITALS
OXYGEN SATURATION: 99 % | WEIGHT: 160 LBS | SYSTOLIC BLOOD PRESSURE: 100 MMHG | DIASTOLIC BLOOD PRESSURE: 80 MMHG | TEMPERATURE: 98 F | HEART RATE: 68 BPM | BODY MASS INDEX: 26 KG/M2 | RESPIRATION RATE: 16 BRPM

## 2023-12-04 DIAGNOSIS — N89.8 VAGINAL DISCHARGE: Primary | ICD-10-CM

## 2023-12-04 PROCEDURE — 3074F SYST BP LT 130 MM HG: CPT | Performed by: NURSE PRACTITIONER

## 2023-12-04 PROCEDURE — 3079F DIAST BP 80-89 MM HG: CPT | Performed by: NURSE PRACTITIONER

## 2023-12-04 PROCEDURE — 99214 OFFICE O/P EST MOD 30 MIN: CPT | Performed by: NURSE PRACTITIONER

## 2023-12-04 RX ORDER — CLINDAMYCIN PHOSPHATE 20 MG/G
1 CREAM VAGINAL NIGHTLY
Qty: 40 G | Refills: 0 | Status: SHIPPED | OUTPATIENT
Start: 2023-12-04

## 2023-12-05 ENCOUNTER — TELEPHONE (OUTPATIENT)
Dept: FAMILY MEDICINE CLINIC | Facility: CLINIC | Age: 48
End: 2023-12-05

## 2023-12-05 NOTE — TELEPHONE ENCOUNTER
Kadie Kolb from [facility] [employer] is calling because Vaginitis test was canceled because the wrong swab was used

## 2023-12-05 NOTE — TELEPHONE ENCOUNTER
No need to repeat. Please let her know test was unable to be run but she should use medication I prescribed and let me know if symptoms do not resolve. Thanks.

## 2023-12-05 NOTE — TELEPHONE ENCOUNTER
Called [facility] in detail that they could not run the BV test and she should finish the medication and if not better call us back.

## 2023-12-20 ENCOUNTER — LAB ENCOUNTER (OUTPATIENT)
Dept: LAB | Age: 48
End: 2023-12-20
Attending: NURSE PRACTITIONER
Payer: COMMERCIAL

## 2023-12-20 ENCOUNTER — HOSPITAL ENCOUNTER (OUTPATIENT)
Dept: MAMMOGRAPHY | Facility: HOSPITAL | Age: 48
Discharge: HOME | End: 2023-12-20
Attending: FAMILY MEDICINE
Payer: COMMERCIAL

## 2023-12-20 DIAGNOSIS — R74.01 ELEVATED ALT MEASUREMENT: ICD-10-CM

## 2023-12-20 DIAGNOSIS — Z12.31 VISIT FOR SCREENING MAMMOGRAM: ICD-10-CM

## 2023-12-20 DIAGNOSIS — R74.01 ELEVATED AST (SGOT): ICD-10-CM

## 2023-12-20 LAB
A1AT SERPL-MCNC: 129 MG/DL (ref 90–200)
ALBUMIN SERPL-MCNC: 4.1 G/DL (ref 3.4–5)
ALBUMIN/GLOB SERPL: 1.2 {RATIO} (ref 1–2)
ALP LIVER SERPL-CCNC: 92 U/L
ALT SERPL-CCNC: 46 U/L
ANION GAP SERPL CALC-SCNC: 3 MMOL/L (ref 0–18)
AST SERPL-CCNC: 20 U/L (ref 15–37)
BILIRUB SERPL-MCNC: 1.2 MG/DL (ref 0.1–2)
BUN BLD-MCNC: 23 MG/DL (ref 9–23)
CALCIUM BLD-MCNC: 9.8 MG/DL (ref 8.5–10.1)
CERULOPLASMIN SERPL-MCNC: 27.1 MG/DL (ref 20–60)
CHLORIDE SERPL-SCNC: 110 MMOL/L (ref 98–112)
CK SERPL-CCNC: 107 U/L
CO2 SERPL-SCNC: 28 MMOL/L (ref 21–32)
CREAT BLD-MCNC: 1.22 MG/DL
DEPRECATED HBV CORE AB SER IA-ACNC: 83 NG/ML
EGFRCR SERPLBLD CKD-EPI 2021: 55 ML/MIN/1.73M2 (ref 60–?)
FASTING STATUS PATIENT QL REPORTED: YES
GLOBULIN PLAS-MCNC: 3.3 G/DL (ref 2.8–4.4)
GLUCOSE BLD-MCNC: 90 MG/DL (ref 70–99)
HAV IGM SER QL: NONREACTIVE
HBV CORE IGM SER QL: NONREACTIVE
HBV SURFACE AG SERPL QL IA: NONREACTIVE
HCV AB SERPL QL IA: NONREACTIVE
IGA SERPL-MCNC: 171 MG/DL (ref 70–312)
INR BLD: 1.03 (ref 0.8–1.2)
OSMOLALITY SERPL CALC.SUM OF ELEC: 295 MOSM/KG (ref 275–295)
POTASSIUM SERPL-SCNC: 4.5 MMOL/L (ref 3.5–5.1)
PROT SERPL-MCNC: 7.4 G/DL (ref 6.4–8.2)
PROTHROMBIN TIME: 13.5 SECONDS (ref 11.6–14.8)
SODIUM SERPL-SCNC: 141 MMOL/L (ref 136–145)

## 2023-12-20 PROCEDURE — 82784 ASSAY IGA/IGD/IGG/IGM EACH: CPT

## 2023-12-20 PROCEDURE — 80074 ACUTE HEPATITIS PANEL: CPT

## 2023-12-20 PROCEDURE — 85610 PROTHROMBIN TIME: CPT

## 2023-12-20 PROCEDURE — 83516 IMMUNOASSAY NONANTIBODY: CPT

## 2023-12-20 PROCEDURE — 86038 ANTINUCLEAR ANTIBODIES: CPT

## 2023-12-20 PROCEDURE — 82728 ASSAY OF FERRITIN: CPT

## 2023-12-20 PROCEDURE — 82390 ASSAY OF CERULOPLASMIN: CPT

## 2023-12-20 PROCEDURE — 82550 ASSAY OF CK (CPK): CPT

## 2023-12-20 PROCEDURE — 36415 COLL VENOUS BLD VENIPUNCTURE: CPT

## 2023-12-20 PROCEDURE — 86364 TISS TRNSGLTMNASE EA IG CLAS: CPT

## 2023-12-20 PROCEDURE — 77063 BREAST TOMOSYNTHESIS BI: CPT | Performed by: FAMILY MEDICINE

## 2023-12-20 PROCEDURE — 80053 COMPREHEN METABOLIC PANEL: CPT

## 2023-12-20 PROCEDURE — 82103 ALPHA-1-ANTITRYPSIN TOTAL: CPT

## 2023-12-20 PROCEDURE — 77067 SCR MAMMO BI INCL CAD: CPT | Performed by: FAMILY MEDICINE

## 2023-12-21 LAB
ACTIN SMOOTH MUSCLE AB: 6 UNITS
M2 MITOCHONDRIAL AB: <20 UNITS
NUCLEAR IGG TITR SER IF: NEGATIVE {TITER}
TTG IGA SER-ACNC: 0.4 U/ML (ref ?–7)

## 2023-12-22 ENCOUNTER — HOSPITAL ENCOUNTER (OUTPATIENT)
Dept: MRI IMAGING | Facility: HOSPITAL | Age: 48
Discharge: HOME | End: 2023-12-22
Attending: NURSE PRACTITIONER
Payer: COMMERCIAL

## 2023-12-22 DIAGNOSIS — R74.01 ELEVATED AST (SGOT): ICD-10-CM

## 2023-12-22 DIAGNOSIS — R74.01 ELEVATED ALT MEASUREMENT: ICD-10-CM

## 2023-12-22 PROCEDURE — 74181 MRI ABDOMEN W/O CONTRAST: CPT | Performed by: NURSE PRACTITIONER

## 2023-12-22 PROCEDURE — 76376 3D RENDER W/INTRP POSTPROCES: CPT | Performed by: NURSE PRACTITIONER

## 2024-01-30 ENCOUNTER — OFFICE VISIT (OUTPATIENT)
Dept: FAMILY MEDICINE CLINIC | Facility: CLINIC | Age: 49
End: 2024-01-30
Payer: COMMERCIAL

## 2024-01-30 VITALS
TEMPERATURE: 97 F | HEART RATE: 62 BPM | BODY MASS INDEX: 26.42 KG/M2 | SYSTOLIC BLOOD PRESSURE: 104 MMHG | WEIGHT: 164.38 LBS | HEIGHT: 66 IN | OXYGEN SATURATION: 98 % | RESPIRATION RATE: 16 BRPM | DIASTOLIC BLOOD PRESSURE: 80 MMHG

## 2024-01-30 DIAGNOSIS — M25.511 ACUTE PAIN OF RIGHT SHOULDER: Primary | ICD-10-CM

## 2024-01-30 PROCEDURE — 99213 OFFICE O/P EST LOW 20 MIN: CPT | Performed by: NURSE PRACTITIONER

## 2024-01-30 PROCEDURE — 3008F BODY MASS INDEX DOCD: CPT | Performed by: NURSE PRACTITIONER

## 2024-01-30 PROCEDURE — 3079F DIAST BP 80-89 MM HG: CPT | Performed by: NURSE PRACTITIONER

## 2024-01-30 PROCEDURE — 3074F SYST BP LT 130 MM HG: CPT | Performed by: NURSE PRACTITIONER

## 2024-01-30 RX ORDER — NAPROXEN 500 MG/1
500 TABLET ORAL 2 TIMES DAILY WITH MEALS
Qty: 10 TABLET | Refills: 0 | Status: SHIPPED | OUTPATIENT
Start: 2024-01-30

## 2024-01-30 NOTE — PROGRESS NOTES
Chief Complaint   Patient presents with    Pain     Right shoulder about 4 months        HPI:  Presents with approx 4 month history of intermittent right shoulder pain, worse with reaching behind her or with certain lifting exercises at the gym. Denies known injury to shoulder. Denies redness, swelling, locking or limited ROM to shoulder. Has not been treating with anything consistently.       Past Medical History:   Diagnosis Date    Abdominal pain     Amenorrhea     IUD    Asthma     Back pain     Bloating     Body piercing     Calculus of kidney     Constipation     Dysmenorrhea     Flatulence/gas pain/belching     GERD (gastroesophageal reflux disease)     Headache disorder     Heartburn 09/01/2016    Irregular bowel habits     Wears glasses        Patient Active Problem List   Diagnosis    Kidney stone    Lumbar radiculopathy    HNP (herniated nucleus pulposus), lumbar    IUD (intrauterine device) in place    Special screening for malignant neoplasm of colon       Current Outpatient Medications   Medication Sig Dispense Refill    naproxen 500 MG Oral Tab Take 1 tablet (500 mg total) by mouth 2 (two) times daily with meals. 10 tablet 0    clindamycin 2 % Vaginal Cream Place 1 applicator vaginally nightly. 40 g 0    albuterol 108 (90 Base) MCG/ACT Inhalation Aero Soln Inhale 2 puffs into the lungs every 6 (six) hours as needed for Wheezing (cough). 18 g 0    OMEPRAZOLE 20 MG Oral Capsule Delayed Release TAKE ONE CAPSULE BY MOUTH EVERY DAY (Patient taking differently: Taking as needed) 30 capsule 0       Physical Exam  /80   Pulse 62   Temp 96.7 °F (35.9 °C)   Resp 16   Ht 5' 6\" (1.676 m)   Wt 164 lb 6.4 oz (74.6 kg)   SpO2 98%   BMI 26.53 kg/m²   Constitutional: well developed, well nourished, in no apparent distress  HEENT: Normocephalic and atraumatic.   MS: right shoulder w/o edema, erythema, masses, deformity or TTP. Muscle strength bilat shoulders 5/5. Negative drop test. Positive internal  rotation test. Pain not reproduced with lateral and anterior elevation of right arm to level of shoulder. Cross body reach normal.   Skin: Skin is warm and dry. No rash noted. No erythema. No pallor.     A/P:    Encounter Diagnosis   Name Primary?    Acute pain of right shoulder- suspect rotator cuff strain. Naproxen BID. Medication administration, use and side effects discussed. Physical therapy. Discussed shoulder rest and exercise modification at length. Notify office if no improvement with PT. Verbalized understanding of instructions and agreeable to this plan of care.    Yes       No orders of the defined types were placed in this encounter.      Meds & Refills for this Visit:  Requested Prescriptions     Signed Prescriptions Disp Refills    naproxen 500 MG Oral Tab 10 tablet 0     Sig: Take 1 tablet (500 mg total) by mouth 2 (two) times daily with meals.       Imaging & Consults:  OP REFERRAL TO [facility]    No follow-ups on file.  Patient Instructions                             Take Naproxen 1 tab, twice daily, until all tabs are gone. Space doses 12 hours apart for best effect. TAKE WITH FOOD. Do not take any Advil, Motrin, Aleve or ibuprofen products while taking this medication.         It is ok to take acetaminophen (Tylenol) while taking this medication. If you have regular strength tylenol may take 3 tabs up to 3 times daily. If you have extra-strength tylenol may take 2 tabs up to 3 times daily.         All questions were answered and the patient understands the plan.

## 2024-01-30 NOTE — PATIENT INSTRUCTIONS
Take Naproxen 1 tab, twice daily, until all tabs are gone. Space doses 12 hours apart for best effect. TAKE WITH FOOD. Do not take any Advil, Motrin, Aleve or ibuprofen products while taking this medication.         It is ok to take acetaminophen (Tylenol) while taking this medication. If you have regular strength tylenol may take 3 tabs up to 3 times daily. If you have extra-strength tylenol may take 2 tabs up to 3 times daily.

## 2024-01-31 ENCOUNTER — PATIENT MESSAGE (OUTPATIENT)
Dept: FAMILY MEDICINE CLINIC | Facility: CLINIC | Age: 49
End: 2024-01-31

## 2024-02-01 NOTE — TELEPHONE ENCOUNTER
From: Coleman Gastelum  To: Aramis Castro  Sent: 1/31/2024 10:26 PM CST  Subject: Victor Manuel Omalleyu     I have an authorization for self administration of medication form for Victor Manuel Zelaya for his inhaler. I will fax it into you tomorrow February 1. Will you fill out your portion and sign it and then you can fax it to the school which is [employer] and it’s the nurses office and the fax number is [phone]. The nurses name is Win Duke.     Thanks, Marion

## 2024-02-16 ENCOUNTER — TELEPHONE (OUTPATIENT)
Dept: PHYSICAL THERAPY | Facility: HOSPITAL | Age: 49
End: 2024-02-16

## 2024-02-21 ENCOUNTER — OFFICE VISIT (OUTPATIENT)
Dept: PHYSICAL THERAPY | Age: 49
End: 2024-02-21
Attending: NURSE PRACTITIONER
Payer: COMMERCIAL

## 2024-02-21 ENCOUNTER — APPOINTMENT (OUTPATIENT)
Dept: PHYSICAL THERAPY | Age: 49
End: 2024-02-21
Attending: NURSE PRACTITIONER
Payer: COMMERCIAL

## 2024-02-21 DIAGNOSIS — M25.511 ACUTE PAIN OF RIGHT SHOULDER: Primary | ICD-10-CM

## 2024-02-21 PROCEDURE — 97161 PT EVAL LOW COMPLEX 20 MIN: CPT

## 2024-02-21 PROCEDURE — 97110 THERAPEUTIC EXERCISES: CPT

## 2024-02-21 NOTE — PROGRESS NOTES
SHOULDER EVALUATION:     Diagnosis:   Acute pain of right shoulder (M25.511)       Referring Provider: Matthew  Date of Evaluation:    2/21/2024    Precautions:  None Next MD visit:   none scheduled  Date of Surgery: n/a     PATIENT SUMMARY   Coleman Gastelum is a 48 year old female who presents to therapy today with complaints of right shoulder pain x 1 year.  States that she had gradual onset of right shoulder pain and pain is not getting better. left shoulder started hurting a couple months ago too. Went to see GP> referral to OP, prescribed naproxen, has not started that yet. No formal imaging. 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 Tries to get up often to walk around.     Pt describes pain level current 6/10, at best 4/10, at worst 10/10.   Current functional limitations include difficulty with reaching back seat of car, reaching overhead, lifting/carrying >15#, sleeping on right side, donning/doffing shirt, donning doffing bra and coat.     Coleman describes prior level of function able to use right UE without pain or discomfort. Pt goals include to be able to use right shoulder normally and without pain  Past medical history was reviewed with Coleman. Significant findings include   Past Medical History:   Diagnosis Date    Abdominal pain     Amenorrhea     IUD    Asthma (HCC)     Back pain     Bloating     Body piercing     Calculus of kidney     Constipation     Dysmenorrhea     Flatulence/gas pain/belching     GERD (gastroesophageal reflux disease)     Headache disorder     Heartburn 09/01/2016    Irregular bowel habits     Wears glasses          ASSESSMENT  Coleman presents to physical therapy evaluation with primary c/o right shoulder pain. The results of the objective tests and measures show impaired posture,  flexibility, ROM, joint mobility, muscle performance and motor function.  Functional deficits include but are not limited to difficulty with reaching back seat of car, reaching  overhead, lifting/carrying >15#, sleeping on right side, donning/doffing shirt, donning doffing bra and coat.  Signs and symptoms are consistent with diagnosis of impingement syndrome and rotator cuff tendonitis, right shoulder. Pt and PT discussed evaluation findings, pathology, POC and HEP.  Pt voiced understanding and performs HEP correctly without reported pain. Skilled Physical Therapy is medically necessary to address the above impairments and reach functional goals.     OBJECTIVE:   Observation/Posture: rounded shoulder posture  Palpation: moderate tenderness on palpation of right supraspinatus and bicep tendons  Sensation: Sensation intact to light touch.      AROM: (* denotes performed with pain)  Shoulder  Elbow   Flexion: R 130 deg*; L 160 deg  Abduction: R 175 deg* (painful arc  deg); L 180 deg  ER: R 85 deg*; L 90 deg*  IR: R L4*; L T6 WNL     Accessory motion: ACJ: WNL, GHJ: mild hypomobility with AP glides; thoracic spine hypomobility noted    Flexibility: pectorals: Moderate restrictions    Strength/MMT: (* denotes performed with pain)  Shoulder Elbow Scapular   Flexion: R 5/5*; L 5/5*  Abduction: R 4+/5*; L 4+/5*  ER: R 4/5*; L 5/5  IR: R 5/5; L 5/5 Flexion: R 5/5; L 5/5  Extension: R 5/5; L 5/5   Rhomboids: R3+/5, L 3+/5  Mid trap: R 3+/5; L 3+/5        Special tests:   Labrum Special Testing:   Biceps Load II: R neg, L neg    Instability Special Testing:   Apprehension Test: R neg, L neg  Sulcus Sign: R neg, L neg    Subacromial Pain Syndrome:  Empty Can test: R pos, L neg  Painful Arc Sign: R pos, L neg  External Rotation Resistance: R pos, L neg  Velázquez-Willian Impingement Sign: R Pos, L pos  Neer Impingement Test: R neg, L neg  Palpable Tenderness Infraspinatus and Supraspinatus: R pos, L neg    Long Head Biceps Tendinopathy:   Speed Test: R pos-mild, L neg    Rotator Cuff Tears:   ER Lag Sign: R neg, L neg  Dropping Sign at 90 Deg ABD and 45 deg ER: R neg, L neg  IR Lag Sign: R neg, L  neg    AC Compression: R neg, L neg      Today’s Treatment and Response:   Pt education was provided on exam findings, treatment diagnosis, treatment plan, expectations, and prognosis. Pt was also provided recommendations for activity modifications, possible soreness after evaluation, modalities as needed [ice/heat], postural corrections, ergonomics, and importance of remaining active.  Patient was instructed in and issued a HEP for: See AVS  Access Code: [email or web address]  URL: [email or web address]  Date: 02/21/2024  Prepared by: Lee Adams    Exercises  - Doorway Pec Stretch at 60 Elevation  - 3 x daily - 7 x weekly - 1 sets - 3 reps - 10 sec hold  - Supine Shoulder Flexion Extension AAROM with Dowel  - 3 x daily - 7 x weekly - 1 sets - 10 reps - 2 sec hold  - Seated Shoulder External Rotation AAROM with Cane and Hand in Neutral  - 3 x daily - 7 x weekly - 1 sets - 10 reps - 3 sec hold  - Standing shoulder flexion wall slides  - 3 x daily - 7 x weekly - 1 sets - 10 reps - 2 sec hold  - Standing Shoulder Internal Rotation Stretch with Dowel  - 3 x daily - 7 x weekly - 1 sets - 10 reps - 3 sec hold  - Standing Shoulder Row with Anchored Resistance  - 3 x daily - 7 x weekly - 1 sets - 15 reps - 3 sec hold  Charges: PT Eval Low Complexity, Ex 1      Total Timed Treatment: 15 min     Total Treatment Time: 45 min     Based on clinical rationale and outcome measures, this evaluation involved Low Complexity decision making .  PLAN OF CARE:    Goals: (to be met in 10 visits)   Pt will report improved ability to sleep without waking due to shoulder pain   Pt will improve shoulder flexion AROM to 160 degrees pain free to be able to reach into overhead cabinets without pain or restriction   Pt will improve shoulder abduction AROM to >175 degrees painfree to improve ability to don deodorant, don/doff shirts, and wash hair   Pt will increase shoulder AROM ER to 85 to be able to reach and fasten seatbelt   Pt will increase  shoulder AROM IR to T7 to be able to reach in back pocket, tuck in shirt, and turn steering wheel without pain  Pt will improve shoulder strength throughout to 5/5 to improve function with lifting overhead   Pt will demonstrate increased mid/low trap strength to 4+/5 to promote improved shoulder mechanics and stabilization with lifting and reaching   Pt will be independent and compliant with comprehensive HEP to maintain progress achieved in PT       Frequency / Duration: Patient will be seen for 2 x/week or a total of 10 visits over a 90 day period. Treatment will include: Manual Therapy, Neuromuscular Re-education, Therapeutic Exercise, and Home Exercise Program instruction    Education or treatment limitation: None  Rehab Potential:good    QuickDASH Outcome Score  36.36% 2/21/2024      Patient/Family/Caregiver was advised of these findings, precautions, and treatment options and has agreed to actively participate in planning and for this course of care.    Thank you for your referral. Please co-sign or sign and return this letter via fax as soon as possible to [phone]. If you have any questions, please contact me at Dept: [phone]    Sincerely,  Electronically signed by therapist: Lee Adams, PT  Physician's certification required: Yes  I certify the need for these services furnished under this plan of treatment and while under my care.    X___________________________________________________ Date____________________    Certification From: 2/21/2024  To:5/21/2024

## 2024-02-21 NOTE — PATIENT INSTRUCTIONS
Access Code: [email or web address]  URL: [email or web address]  Date: 02/21/2024  Prepared by: Lee Adams    Exercises  - Doorway Pec Stretch at 60 Elevation  - 3 x daily - 7 x weekly - 1 sets - 3 reps - 10 sec hold  - Supine Shoulder Flexion Extension AAROM with Dowel  - 3 x daily - 7 x weekly - 1 sets - 10 reps - 2 sec hold  - Seated Shoulder External Rotation AAROM with Cane and Hand in Neutral  - 3 x daily - 7 x weekly - 1 sets - 10 reps - 3 sec hold  - Standing shoulder flexion wall slides  - 3 x daily - 7 x weekly - 1 sets - 10 reps - 2 sec hold  - Standing Shoulder Internal Rotation Stretch with Dowel  - 3 x daily - 7 x weekly - 1 sets - 10 reps - 3 sec hold  - Standing Shoulder Row with Anchored Resistance  - 3 x daily - 7 x weekly - 1 sets - 15 reps - 3 sec hold

## 2024-02-23 ENCOUNTER — OFFICE VISIT (OUTPATIENT)
Dept: PHYSICAL THERAPY | Age: 49
End: 2024-02-23
Attending: NURSE PRACTITIONER
Payer: COMMERCIAL

## 2024-02-23 PROCEDURE — 97110 THERAPEUTIC EXERCISES: CPT

## 2024-02-23 NOTE — PROGRESS NOTES
Diagnosis:   Acute pain of right shoulder (M25.511)       Referring Provider: Matthew  Date of Evaluation:    2/21/2024    Precautions:  None Next MD visit:   none scheduled  Date of Surgery: n/a   Insurance Primary/Secondary: [employer] [place]MO / N/A     # Auth Visits: 5            Subjective: I have been doing the exercises and stretches and I have felt pretty good     Pain: 0/10      Objective: Shoulder AROM  Flexion: 140* (after arm bike), 152* (at end range, at end of session)  Abduction: 175* at the end range, 175 * (at end range, less intense; at end of session)      Assessment: Patient tolerated session well. Patient presented with no complaints of shoulder pain. Active shoulder flexion and abduction was assessed and reassessed throughout session, with patient making improvements during session. Patient continues to demonstrate difficulty with behind the back IR, and increased pain with motion. Therefore exercise was removed for HEP. Patient demonstrates independence with current HEP. Patient educated on importance of working through pain-free range, and listening to body when rest is needed. Patient verbalized understanding.       Goals: (to be met in 10 visits)   Pt will report improved ability to sleep without waking due to shoulder pain   Pt will improve shoulder flexion AROM to 160 degrees pain free to be able to reach into overhead cabinets without pain or restriction   Pt will improve shoulder abduction AROM to >175 degrees painfree to improve ability to don deodorant, don/doff shirts, and wash hair   Pt will increase shoulder AROM ER to 85 to be able to reach and fasten seatbelt   Pt will increase shoulder AROM IR to T7 to be able to reach in back pocket, tuck in shirt, and turn steering wheel without pain  Pt will improve shoulder strength throughout to 5/5 to improve function with lifting overhead   Pt will demonstrate increased mid/low trap strength to 4+/5 to promote improved shoulder mechanics and  stabilization with lifting and reaching   Pt will be independent and compliant with comprehensive HEP to maintain progress achieved in PT     Plan: continue with AROM and strengthening of the right shoulder   Date: 2/23/2024  TX#: 2/5 Date:                 TX#: 3/ Date:                 TX#: 4/ Date:                 TX#: 5/ Date:   Tx#: 6/   TherEx- 40 min  Arm bike 3 min forward 3 min backward  Sidelying ER with towel 2 x 8  Seated IR with towel 2 x 10  Banded rows RTB 2 x 10  Towel slides into shoulder flexion 2 x 10  Towel slides into shoulder abduction 2 x 10  Behind the back IR AAROM* discontinued d/t increased pain        Cold pack for 5 minutes to right shoulder                     HEP: Access Code: [email or web address]  URL: [email or web address]  Date: 02/23/2024  Prepared by: Delio Wood    Program Notes  Coleman Gastelum    Exercises  - Seated Scapular Retraction  - 5 x daily - 7 x weekly - 2 sets - 10 reps - 3 hold  - Doorway Pec Stretch at 60 Elevation  - 2 x daily - 7 x weekly - 2 sets - 5 reps - 10 hold  - Supine Shoulder Flexion Extension AAROM with Dowel  - 2 x daily - 7 x weekly - 2 sets - 10 reps - 3 hold  - Seated Shoulder External Rotation AAROM with Cane and Hand in Neutral  - 2 x daily - 7 x weekly - 2 sets - 10 reps - 3 hold  - Standing shoulder flexion wall slides  - 2 x daily - 7 x weekly - 2 sets - 10 reps  - Standing Shoulder Abduction Wall Slide with Thumb Out  - 2 x daily - 7 x weekly - 2 sets - 10 reps  - Standing Shoulder Row with Anchored Resistance  - 2 x daily - 7 x weekly - 2 sets - 10 reps    Charges: 3 TherEx       Total Timed Treatment: 40 min  Total Treatment Time: 45 min

## 2024-02-28 ENCOUNTER — OFFICE VISIT (OUTPATIENT)
Dept: PHYSICAL THERAPY | Age: 49
End: 2024-02-28
Attending: NURSE PRACTITIONER
Payer: COMMERCIAL

## 2024-02-28 PROCEDURE — 97110 THERAPEUTIC EXERCISES: CPT

## 2024-02-28 NOTE — PROGRESS NOTES
Diagnosis:   Acute pain of right shoulder (M25.511)       Referring Provider: Matthew  Date of Evaluation:    2/21/2024    Precautions:  None Next MD visit:   none scheduled  Date of Surgery: n/a   Insurance Primary/Secondary: [employer] / N/A     # Auth Visits: 5            Subjective: I've been getting a tiny bit of pain with the exercises. I am feeling better than when I first came.     Pain: 3/10 posterior right shoulder, 1/10 posterior right shoulder      Objective: Shoulder AROM  Flexion: 142* (pinch on superior aspect of shoulder, after arm bike), 162 (no pain, after AAROM)  Abduction: 110* through the whole range, 132* (pain concentrated at the deltoid muscle, after AAROM )    AAROM abduction R - 170, no pain     Grade 3 mobs: posterior ( no hypo/hyper-mobility) R felt relief ; inferior glides R slight hypomobility     Assessment: Patient tolerated session well. Patient presented with mild complaints of right shoulder pain. Patient demonstrated decreased right active shoulder abduction compared to previous session, although she made within session improvements with AROM, and decreased intensity of pain. Patient continued to report pain with pec door stretch, therefore adjustments were made to HEP. Mobs of the right shoulder provided relief with posterior mobs, and slight hypomobility with inferior glides. Attempted to teach posterior capsule stretch, although it was discontinued due to anterior shoulder pinching.      Goals: (to be met in 10 visits)   Pt will report improved ability to sleep without waking due to shoulder pain   Pt will improve shoulder flexion AROM to 160 degrees pain free to be able to reach into overhead cabinets without pain or restriction   Pt will improve shoulder abduction AROM to >175 degrees painfree to improve ability to don deodorant, don/doff shirts, and wash hair   Pt will increase shoulder AROM ER to 85 to be able to reach and fasten seatbelt   Pt will increase shoulder AROM IR  to T7 to be able to reach in back pocket, tuck in shirt, and turn steering wheel without pain  Pt will improve shoulder strength throughout to 5/5 to improve function with lifting overhead   Pt will demonstrate increased mid/low trap strength to 4+/5 to promote improved shoulder mechanics and stabilization with lifting and reaching   Pt will be independent and compliant with comprehensive HEP to maintain progress achieved in PT     Plan: continue with AROM and strengthening of the right shoulder   Date: 2/23/2024  TX#: 2/5 Date:2/28/2024                 TX#: 3/5 Date:                 TX#: 4/ Date:                 TX#: 5/ Date:   Tx#: 6/   TherEx- 40 min  Arm bike 3 min forward 3 min backward  Sidelying ER with towel 2 x 8  Seated IR with towel 2 x 10  Banded rows RTB 2 x 10  Towel slides into shoulder flexion 2 x 10  Towel slides into shoulder abduction 2 x 10  Behind the back IR AAROM* discontinued d/t increased pain  TherEx   Arm bike level 1, 3 min forward, 3 min backward  Towel slides into shoulder flexion 2 x 10  Towel slides into shoulder abduction 2 x 10  Banded rows GTB 2 x 10  Banded scapular retractions RTB 2 x 10   Posterior capsule stretch, discontinued d/t anterior shoulder pinching      Cold pack for 5 minutes to right shoulder Grade 3 posterior and inferior mobs to right shoulder                     HEP: Access Code: [email or web address]  URL: [email or web address]  Date: 02/28/2024  Prepared by: Delio Wood    Program Notes  Coleman Gastelum    Exercises  - Seated Scapular Retraction  - 5 x daily - 7 x weekly - 2 sets - 10 reps - 3 hold  - Doorway Pec Stretch at 60 Degrees Abduction with Arm Straight  - 2 x daily - 7 x weekly - 2 sets - 5 reps - 10 hold  - Supine Shoulder Flexion Extension AAROM with Dowel  - 2 x daily - 7 x weekly - 2 sets - 10 reps - 3 hold  - Seated Shoulder External Rotation AAROM with Cane and Hand in Neutral  - 2 x daily - 7 x weekly - 2 sets - 10 reps - 3 hold  - Standing  shoulder flexion wall slides  - 2 x daily - 7 x weekly - 2 sets - 10 reps  - Standing Shoulder Abduction Wall Slide with Thumb Out  - 2 x daily - 7 x weekly - 2 sets - 10 reps  - Standing Shoulder Row with Anchored Resistance  - 2 x daily - 7 x weekly - 2 sets - 8 reps  - Shoulder extension with resistance - Neutral  - 2 x daily - 7 x weekly - 2 sets - 10 reps    Charges: 3 TherEx       Total Timed Treatment: 40 min  Total Treatment Time: 40 min

## 2024-03-01 ENCOUNTER — OFFICE VISIT (OUTPATIENT)
Dept: PHYSICAL THERAPY | Age: 49
End: 2024-03-01
Attending: NURSE PRACTITIONER
Payer: COMMERCIAL

## 2024-03-01 PROCEDURE — 97110 THERAPEUTIC EXERCISES: CPT

## 2024-03-01 NOTE — PROGRESS NOTES
Diagnosis:   Acute pain of right shoulder (M25.511)       Referring Provider: Matthew  Date of Evaluation:    2/21/2024    Precautions:  None Next MD visit:   none scheduled  Date of Surgery: n/a   Insurance Primary/Secondary: [employer] / N/A     # Auth Visits: 5            Subjective: The exercises are going well, I have been able to get back into the exercises classes with a little modifications     Pain: 4/10 triceps area soreness, 1/10 posterior right shoulder      Objective: Shoulder AROM  Flexion: 152 (no pain, after arm bike), 162 (no pain)  Abduction: 150 (no pain, after arm bike), 158  ER: 85    Assessment: Patient tolerated session well. Patient presented to session with soreness in the triceps area and mild pain in the posterior right shoulder. Patient demonstrated improvements with shoulder AROM and moved through range without increased pain ratings. Patient's exercises were progressed to focus on active range through the beginning range, and all exercises were completed without increased pain. Patient's HEP was progressed to active range of motion, with education that if pain returns to go back to completing AAROM exercises in that plane of motion. Patient verbalized understanding. Right posterior capsule stretch was completed with the desired stretch and no reports of pain. Patient continues to demonstrate difficulty and pain with behind the back motion.       Goals: (to be met in 10 visits)   Pt will report improved ability to sleep without waking due to shoulder pain   Pt will improve shoulder flexion AROM to 160 degrees pain free to be able to reach into overhead cabinets without pain or restriction MET  Pt will improve shoulder abduction AROM to >175 degrees painfree to improve ability to don deodorant, don/doff shirts, and wash hair   Pt will increase shoulder AROM ER to 85 to be able to reach and fasten seatbelt MET  Pt will increase shoulder AROM IR to T7 to be able to reach in back pocket, ck  in shirt, and turn steering wheel without pain  Pt will improve shoulder strength throughout to 5/5 to improve function with lifting overhead   Pt will demonstrate increased mid/low trap strength to 4+/5 to promote improved shoulder mechanics and stabilization with lifting and reaching   Pt will be independent and compliant with comprehensive HEP to maintain progress achieved in PT     Plan: continue with AROM and strengthening of the right shoulder.  Date: 2/23/2024  TX#: 2/5 Date:2/28/2024                 TX#: 3/5 Date:3/1/2024                 TX#: 4/5 Date:                 TX#: 5/ Date:   Tx#: 6/   TherEx- 40 min  Arm bike 3 min forward 3 min backward  Sidelying ER with towel 2 x 8  Seated IR with towel 2 x 10  Banded rows RTB 2 x 10  Towel slides into shoulder flexion 2 x 10  Towel slides into shoulder abduction 2 x 10  Behind the back IR AAROM* discontinued d/t increased pain  TherEx   Arm bike level 1, 3 min forward, 3 min backward  Towel slides into shoulder flexion 2 x 10  Towel slides into shoulder abduction 2 x 10  Banded rows GTB 2 x 10  Banded scapular retractions RTB 2 x 10   Posterior capsule stretch, discontinued d/t anterior shoulder pinching TherEx   Arm Bike level 1, 4 min forward, 4 min backward  Seated shoulder flexion to 90 degrees 2 x 10  Seated shoulder abduction to 90 degrees 2 x 10  Posterior capsule stretch 3 x 15 sec  Banded rows GTB 2 x 10  Banded scapular retractions RTB 2 x 10  Banded ER with YTB 2 x 10       Cold pack for 5 minutes to right shoulder Grade 3 posterior and inferior mobs to right shoulder  Grade 3 posterior and inferior mobs to right shoulder 4 mins       Cold pack for 7 min to right shoulder            HEP: Access Code: [email or web address]  URL: [email or web address]  Date: 03/01/2024  Prepared by: Delio Wood    Program Notes  Coleman Gastelum    Exercises  - Seated Scapular Retraction  - 5 x daily - 7 x weekly - 2 sets - 10 reps - 3 hold  - Doorway Pec Stretch at  60 Degrees Abduction with Arm Straight  - 2 x daily - 7 x weekly - 2 sets - 5 reps - 10 hold  - Standing Shoulder Posterior Capsule Stretch  - 2 x daily - 7 x weekly - 3 sets - 15 hold  - Standing Shoulder Flexion to 90 Degrees  - 2 x daily - 7 x weekly - 2 sets - 10 reps  - Shoulder Abduction - Thumbs Up  - 2 x daily - 7 x weekly - 2 sets - 10 reps  - Standing Shoulder Row with Anchored Resistance  - 2 x daily - 7 x weekly - 2 sets - 8 reps  - Shoulder extension with resistance - Neutral  - 2 x daily - 7 x weekly - 2 sets - 10 reps  - Shoulder Internal Rotation with Resistance  - 2 x daily - 7 x weekly - 2 sets - 10 reps  - Shoulder External Rotation with Anchored Resistance  - 2 x daily - 7 x weekly - 2 sets - 10 reps    Charges: 3 TherEx       Total Timed Treatment: 45 min  Total Treatment Time: 45 min

## 2024-03-05 ENCOUNTER — OFFICE VISIT (OUTPATIENT)
Dept: PHYSICAL THERAPY | Age: 49
End: 2024-03-05
Attending: NURSE PRACTITIONER
Payer: COMMERCIAL

## 2024-03-05 PROCEDURE — 97110 THERAPEUTIC EXERCISES: CPT

## 2024-03-05 NOTE — PROGRESS NOTES
Diagnosis:   Acute pain of right shoulder (M25.511)       Referring Provider: Matthew  Date of Evaluation:    2/21/2024    Precautions:  None Next MD visit:   none scheduled  Date of Surgery: n/a   Insurance Primary/Secondary: [employer] / N/A     # Auth Visits: 5           Progress Summary  Pt has attended 5 visits in Physical Therapy.      Subjective: I have been feeling okay. Not as much pain.    Pain: 3/10 triceps area soreness, 0/10 posterior right shoulder, end of session no reports of soreness or pain.      Objective: Right Shoulder AROM  Flexion: 150 (no pain, after arm bike), 168 (no pain)  Abduction: 150 (no pain, after arm bike), 175 (no pain)   IR: T10  MMT: R shoulder flexion: 3+/5; R shoulder abduction 3+*/5; R shoulder IR 4/5; R shoulder ER 3+*/5    Assessment: Patient tolerated session well. Patient presented to session with no reports of right shoulder pain and mild complaints of posterior arm soreness. Patient demonstrated improvements with active range of motion in shoulder flexion, abduction, and internal rotation. Patient reports improvements with bathing and dressing. Patient continues to demonstrated decreased strength in all planes of motion.  Patient also reports increased posterior shoulder pain during MMT. Patient continues to demonstrate ability to complete all exercises without increased reports of pain. Patient's Quickdash improved from 36.36% to 29.55% from evaluation to 3/5. Patient would continue to benefit from skilled therapy services.       Goals: (to be met in 10 visits)   Pt will report improved ability to sleep without waking due to shoulder pain   Pt will improve shoulder flexion AROM to 160 degrees pain free to be able to reach into overhead cabinets without pain or restriction MET  Pt will improve shoulder abduction AROM to >175 degrees painfree to improve ability to don deodorant, don/doff shirts, and wash hair MET  Pt will increase shoulder AROM ER to 85 to be able to reach  and fasten seatbelt MET  Pt will increase shoulder AROM IR to T7 to be able to reach in back pocket, tuck in shirt, and turn steering wheel without pain  Pt will improve shoulder strength throughout to 5/5 to improve function with lifting overhead   Pt will demonstrate increased mid/low trap strength to 4+/5 to promote improved shoulder mechanics and stabilization with lifting and reaching   Pt will be independent and compliant with comprehensive HEP to maintain progress achieved in PT MET    Plan: Continue with AROM and strengthening of the right shoulder.  Date: 2/23/2024  TX#: 2/5 Date:2/28/2024                 TX#: 3/5 Date:3/1/2024                 TX#: 4/5 Date:3/5/2024                 TX#: 5/5 Date:   Tx#: 6/   TherEx- 40 min  Arm bike 3 min forward 3 min backward  Sidelying ER with towel 2 x 8  Seated IR with towel 2 x 10  Banded rows RTB 2 x 10  Towel slides into shoulder flexion 2 x 10  Towel slides into shoulder abduction 2 x 10  Behind the back IR AAROM* discontinued d/t increased pain  TherEx   Arm bike level 1, 3 min forward, 3 min backward  Towel slides into shoulder flexion 2 x 10  Towel slides into shoulder abduction 2 x 10  Banded rows GTB 2 x 10  Banded scapular retractions RTB 2 x 10   Posterior capsule stretch, discontinued d/t anterior shoulder pinching TherEx   Arm Bike level 1, 4 min forward, 4 min backward  Seated shoulder flexion to 90 degrees 2 x 10  Seated shoulder abduction to 90 degrees 2 x 10  Posterior capsule stretch 3 x 15 sec  Banded rows GTB 2 x 10  Banded scapular retractions RTB 2 x 10  Banded ER with YTB 2 x 10   TherEx   Arm Bike level 1, 4 min forward, 4 min backward  Seated shoulder flexion to 90 degrees 2 x 10  Seated shoulder abduction to 90 degrees 2 x 10   Posterior capsule stretch 3 x 15 sec holds  Banded rows GTB 2 x 10  Banded scapular retractions RTB 2 x 10  Banded ER with YTB 2 x 10       Cold pack for 5 minutes to right shoulder Grade 3 posterior and inferior mobs  to right shoulder  Grade 3 posterior and inferior mobs to right shoulder 4 mins Grade 3 posterior and inferior mobs to right shoulder 5 mins       Cold pack for 7 min to right shoulder Cold pack for 7 min to right shoulder           HEP: Access Code: [email or web address]  URL: [email or web address]  Date: 03/01/2024  Prepared by: Delio Wood    Program Notes  Coleman Gastelum    Exercises  - Seated Scapular Retraction  - 5 x daily - 7 x weekly - 2 sets - 10 reps - 3 hold  - Doorway Pec Stretch at 60 Degrees Abduction with Arm Straight  - 2 x daily - 7 x weekly - 2 sets - 5 reps - 10 hold  - Standing Shoulder Posterior Capsule Stretch  - 2 x daily - 7 x weekly - 3 sets - 15 hold  - Standing Shoulder Flexion to 90 Degrees  - 2 x daily - 7 x weekly - 2 sets - 10 reps  - Shoulder Abduction - Thumbs Up  - 2 x daily - 7 x weekly - 2 sets - 10 reps  - Standing Shoulder Row with Anchored Resistance  - 2 x daily - 7 x weekly - 2 sets - 8 reps  - Shoulder extension with resistance - Neutral  - 2 x daily - 7 x weekly - 2 sets - 10 reps  - Shoulder Internal Rotation with Resistance  - 2 x daily - 7 x weekly - 2 sets - 10 reps  - Shoulder External Rotation with Anchored Resistance  - 2 x daily - 7 x weekly - 2 sets - 10 reps    Charges: 3 TherEx       Total Timed Treatment: 45 min  Total Treatment Time: 45 min  QuickDASH Outcome Score  Score: 29.55 % (3/5/2024  1:26 PM)      Plan: Continue skilled Physical Therapy 1 x/week or a total of 5 visits over a 90 day period. Treatment will include: Therapeutic exercise, therapeutic activity, neuromuscular re-education, manual therapy.     Patient/Family/Caregiver was advised of these findings, precautions, and treatment options and has agreed to actively participate in planning and for this course of care.    Thank you for your referral. If you have any questions, please contact me at Dept: [phone].    Sincerely,  Electronically signed by therapist: Delio Wood, PT      Physician's certification required:  Yes  Please co-sign or sign and return this letter via fax as soon as possible to [phone].   I certify the need for these services furnished under this plan of treatment and while under my care.    X___________________________________________________ Date____________________    Certification From: 3/5/2024  To:6/3/2024

## 2024-03-14 ENCOUNTER — LAB ENCOUNTER (OUTPATIENT)
Dept: LAB | Age: 49
End: 2024-03-14
Attending: NURSE PRACTITIONER
Payer: COMMERCIAL

## 2024-03-14 ENCOUNTER — OFFICE VISIT (OUTPATIENT)
Dept: PHYSICAL THERAPY | Age: 49
End: 2024-03-14
Attending: NURSE PRACTITIONER
Payer: COMMERCIAL

## 2024-03-14 DIAGNOSIS — R74.01 ELEVATED ALT MEASUREMENT: ICD-10-CM

## 2024-03-14 LAB
ALBUMIN SERPL-MCNC: 4.1 G/DL (ref 3.4–5)
ALP LIVER SERPL-CCNC: 119 U/L
ALT SERPL-CCNC: 57 U/L
AST SERPL-CCNC: 28 U/L (ref 15–37)
BILIRUB DIRECT SERPL-MCNC: 0.3 MG/DL (ref 0–0.2)
BILIRUB SERPL-MCNC: 1.2 MG/DL (ref 0.1–2)
PROT SERPL-MCNC: 7.3 G/DL (ref 6.4–8.2)

## 2024-03-14 PROCEDURE — 97110 THERAPEUTIC EXERCISES: CPT

## 2024-03-14 PROCEDURE — 36415 COLL VENOUS BLD VENIPUNCTURE: CPT

## 2024-03-14 PROCEDURE — 80076 HEPATIC FUNCTION PANEL: CPT

## 2024-03-14 NOTE — PROGRESS NOTES
Diagnosis:   Acute pain of right shoulder (M25.511)       Referring Provider: Matthew  Date of Evaluation:    2/21/2024    Precautions:  None Next MD visit:   none scheduled  Date of Surgery: n/a   Insurance Primary/Secondary: [employer] / N/A     # Auth Visits: 5             Subjective: I have been feeling a lot better. I have been trying to use my right arm more and am not having too many issues.    Pain: 0/10     Objective: Right Shoulder AROM  Flexion: 152 (no pain, after arm bike), 170 (no pain)  Abduction: 158 (no pain, after arm bike), 175 (no pain)       Assessment: Patient tolerated session well. Patient presented to session with no reports of right shoulder pain. Patient continues to demonstrate improvement with AROM of shoulder flexion and abduction. Strengthening exercises were progressed and patient tolerated all exercises without pain. No reports of any soreness throughout entire session. Patient continues to report improvements with recreational activities and completion of ADLs.       Goals: (to be met in 10 visits)   Pt will report improved ability to sleep without waking due to shoulder pain   Pt will improve shoulder flexion AROM to 160 degrees pain free to be able to reach into overhead cabinets without pain or restriction MET  Pt will improve shoulder abduction AROM to >175 degrees painfree to improve ability to don deodorant, don/doff shirts, and wash hair MET  Pt will increase shoulder AROM ER to 85 to be able to reach and fasten seatbelt MET  Pt will increase shoulder AROM IR to T7 to be able to reach in back pocket, tuck in shirt, and turn steering wheel without pain  Pt will improve shoulder strength throughout to 5/5 to improve function with lifting overhead   Pt will demonstrate increased mid/low trap strength to 4+/5 to promote improved shoulder mechanics and stabilization with lifting and reaching   Pt will be independent and compliant with comprehensive HEP to maintain progress achieved  in PT MET    Plan: Continue with strengthening of the right shoulder and AROM.  Date: 2/23/2024  TX#: 2/5 Date:2/28/2024                 TX#: 3/5 Date:3/1/2024                 TX#: 4/5 Date:3/5/2024                 TX#: 5/5 progress note Date:3/14/2024   Tx#: 6/10   TherEx- 40 min  Arm bike 3 min forward 3 min backward  Sidelying ER with towel 2 x 8  Seated IR with towel 2 x 10  Banded rows RTB 2 x 10  Towel slides into shoulder flexion 2 x 10  Towel slides into shoulder abduction 2 x 10  Behind the back IR AAROM* discontinued d/t increased pain  TherEx   Arm bike level 1, 3 min forward, 3 min backward  Towel slides into shoulder flexion 2 x 10  Towel slides into shoulder abduction 2 x 10  Banded rows GTB 2 x 10  Banded scapular retractions RTB 2 x 10   Posterior capsule stretch, discontinued d/t anterior shoulder pinching TherEx   Arm Bike level 1, 4 min forward, 4 min backward  Seated shoulder flexion to 90 degrees 2 x 10  Seated shoulder abduction to 90 degrees 2 x 10  Posterior capsule stretch 3 x 15 sec  Banded rows GTB 2 x 10  Banded scapular retractions RTB 2 x 10  Banded ER with YTB 2 x 10   TherEx   Arm Bike level 1, 4 min forward, 4 min backward  Seated shoulder flexion to 90 degrees 2 x 10  Seated shoulder abduction to 90 degrees 2 x 10   Posterior capsule stretch 3 x 15 sec holds  Banded rows GTB 2 x 10  Banded scapular retractions RTB 2 x 10  Banded ER with YTB 2 x 10    TherEx - 45 min  Arm bike level 1, 4 min forward, 4 min backward  Towel slides into shoulder flexion 2 x 10  Towel slides into shoulder abduction 2 x 10   Banded rows GTB 2 x 10  Banded scapular retractions RTB 2 x 10  Banded ER with RTB 2 x 10  Seated shoulder abduction to 90 deg 2 x 10   Seated shoulder flexion to 90 deg 2 x 10 1 lbs  Wall pushups with arms wide 2 x 10    Cold pack for 5 minutes to right shoulder Grade 3 posterior and inferior mobs to right shoulder  Grade 3 posterior and inferior mobs to right shoulder 4 mins Grade 3  posterior and inferior mobs to right shoulder 5 mins  Cold pack for 8 min to right shoulder     Cold pack for 7 min to right shoulder Cold pack for 7 min to right shoulder           HEP: Access Code: [email or web address]  URL: [email or web address]  Date: 03/14/2024  Prepared by: Delio Wood    Program Notes  Coleman Gastelum    Exercises  - Supine Shoulder Flexion Extension AAROM with Dowel  - 2 x daily - 7 x weekly - 2 sets - 10 reps - 3 hold  - Doorway Pec Stretch at 60 Degrees Abduction with Arm Straight  - 2 x daily - 7 x weekly - 2 sets - 5 reps - 10 hold  - Standing Shoulder Posterior Capsule Stretch  - 2 x daily - 7 x weekly - 3 sets - 15 hold  - Standing Shoulder Flexion to 90 Degrees  - 2 x daily - 7 x weekly - 2 sets - 10 reps  - Shoulder Abduction - Thumbs Up  - 2 x daily - 7 x weekly - 2 sets - 10 reps  - Standing Shoulder Row with Anchored Resistance  - 2 x daily - 7 x weekly - 2 sets - 8 reps  - Shoulder extension with resistance - Neutral  - 2 x daily - 7 x weekly - 2 sets - 10 reps  - Shoulder Internal Rotation with Resistance  - 2 x daily - 7 x weekly - 2 sets - 10 reps  - Shoulder External Rotation with Anchored Resistance  - 2 x daily - 7 x weekly - 2 sets - 10 reps  - Standing Shoulder Abduction Wall Slide with Thumb Out  - 2 x daily - 7 x weekly - 2 sets - 10 reps    Charges: 3 TherEx       Total Timed Treatment: 45 min  Total Treatment Time: 45 min

## 2024-04-01 ENCOUNTER — OFFICE VISIT (OUTPATIENT)
Dept: PHYSICAL THERAPY | Age: 49
End: 2024-04-01
Attending: NURSE PRACTITIONER
Payer: COMMERCIAL

## 2024-04-01 PROCEDURE — 97110 THERAPEUTIC EXERCISES: CPT

## 2024-04-01 NOTE — PROGRESS NOTES
Diagnosis:   Acute pain of right shoulder (M25.511)       Referring Provider: Matthew  Date of Evaluation:    2/21/2024    Precautions:  None Next MD visit:   none scheduled  Date of Surgery: n/a   Insurance Primary/Secondary: [employer] / N/A     # Auth Visits: 5             Subjective: I have been feeling a lot of tension in the shoulder. It doesn't hurt right now, but if I am reaching up or behind me there is more pain than before    Pain: 0/10     Objective: Right Shoulder AROM  Flexion: 135 (start of session) 162(no pain, after stretches),   Abduction: 170 (start of session) 175 (mild pain at end range, after stretches)       Assessment: Patient tolerated session well. Patient presented to session with no reports of right shoulder pain, although patient has reports of bilateral shoulder and neck tightness. Patient reports that her stress levels have been elevated due to work. Educated patient on importance of mindfulness and taking time for self. Patient educated on using heat and completed stretches. Patient demonstrated slight decrease of shoulder range compared to previous session, although patient demonstrated improvements following completion of stretches. Stretches added to HEP.       Goals: (to be met in 10 visits)   Pt will report improved ability to sleep without waking due to shoulder pain   Pt will improve shoulder flexion AROM to 160 degrees pain free to be able to reach into overhead cabinets without pain or restriction MET  Pt will improve shoulder abduction AROM to >175 degrees painfree to improve ability to don deodorant, don/doff shirts, and wash hair MET  Pt will increase shoulder AROM ER to 85 to be able to reach and fasten seatbelt MET  Pt will increase shoulder AROM IR to T7 to be able to reach in back pocket, tuck in shirt, and turn steering wheel without pain  Pt will improve shoulder strength throughout to 5/5 to improve function with lifting overhead   Pt will demonstrate increased  mid/low trap strength to 4+/5 to promote improved shoulder mechanics and stabilization with lifting and reaching   Pt will be independent and compliant with comprehensive HEP to maintain progress achieved in PT MET    Plan: Continue with strengthening of the right shoulder and AROM.  Date: 2/23/2024  TX#: 2/5 Date:2/28/2024                 TX#: 3/5 Date:3/1/2024                 TX#: 4/5 Date:3/5/2024                 TX#: 5/5 progress note Date:3/14/2024   Tx#: 6/10 Date:4/1/2024  Tx#:7/10   TherEx- 40 min  Arm bike 3 min forward 3 min backward  Sidelying ER with towel 2 x 8  Seated IR with towel 2 x 10  Banded rows RTB 2 x 10  Towel slides into shoulder flexion 2 x 10  Towel slides into shoulder abduction 2 x 10  Behind the back IR AAROM* discontinued d/t increased pain  TherEx   Arm bike level 1, 3 min forward, 3 min backward  Towel slides into shoulder flexion 2 x 10  Towel slides into shoulder abduction 2 x 10  Banded rows GTB 2 x 10  Banded scapular retractions RTB 2 x 10   Posterior capsule stretch, discontinued d/t anterior shoulder pinching TherEx   Arm Bike level 1, 4 min forward, 4 min backward  Seated shoulder flexion to 90 degrees 2 x 10  Seated shoulder abduction to 90 degrees 2 x 10  Posterior capsule stretch 3 x 15 sec  Banded rows GTB 2 x 10  Banded scapular retractions RTB 2 x 10  Banded ER with YTB 2 x 10   TherEx   Arm Bike level 1, 4 min forward, 4 min backward  Seated shoulder flexion to 90 degrees 2 x 10  Seated shoulder abduction to 90 degrees 2 x 10   Posterior capsule stretch 3 x 15 sec holds  Banded rows GTB 2 x 10  Banded scapular retractions RTB 2 x 10  Banded ER with YTB 2 x 10    TherEx - 45 min  Arm bike level 1, 4 min forward, 4 min backward  Towel slides into shoulder flexion 2 x 10  Towel slides into shoulder abduction 2 x 10   Banded rows GTB 2 x 10  Banded scapular retractions RTB 2 x 10  Banded ER with RTB 2 x 10  Seated shoulder abduction to 90 deg 2 x 10   Seated shoulder  flexion to 90 deg 2 x 10 1 lbs  Wall pushups with arms wide 2 x 10  TherEx  Arm bike level 1, 4 min forward, 4 min backward  Gentle upper trap stretch 3 x 30 sec ea side  Gentle levator scapulae stretch 3 x 30 sec ea side  Towel slides into shoulder flexion 2 x 10  Towel slides into shoulder abduction 2 x 10   Shoulder flexion to 90 deg 2 x 10 1 lbs  Shoulder abduction to 90 deg 2 x 10 1 lbs         Cold pack for 5 minutes to right shoulder Grade 3 posterior and inferior mobs to right shoulder  Grade 3 posterior and inferior mobs to right shoulder 4 mins Grade 3 posterior and inferior mobs to right shoulder 5 mins  Cold pack for 8 min to right shoulder Hot pack to right shoulder for 5 min     Cold pack for 7 min to right shoulder Cold pack for 7 min to right shoulder             HEP: Access Code: [email or web address]  URL: [email or web address]  Date: 03/14/2024  Prepared by: Delio Wood    Program Notes  Coleman Gastelum    Exercises  - Supine Shoulder Flexion Extension AAROM with Dowel  - 2 x daily - 7 x weekly - 2 sets - 10 reps - 3 hold  - Doorway Pec Stretch at 60 Degrees Abduction with Arm Straight  - 2 x daily - 7 x weekly - 2 sets - 5 reps - 10 hold  - Standing Shoulder Posterior Capsule Stretch  - 2 x daily - 7 x weekly - 3 sets - 15 hold  - Standing Shoulder Flexion to 90 Degrees  - 2 x daily - 7 x weekly - 2 sets - 10 reps  - Shoulder Abduction - Thumbs Up  - 2 x daily - 7 x weekly - 2 sets - 10 reps  - Standing Shoulder Row with Anchored Resistance  - 2 x daily - 7 x weekly - 2 sets - 8 reps  - Shoulder extension with resistance - Neutral  - 2 x daily - 7 x weekly - 2 sets - 10 reps  - Shoulder Internal Rotation with Resistance  - 2 x daily - 7 x weekly - 2 sets - 10 reps  - Shoulder External Rotation with Anchored Resistance  - 2 x daily - 7 x weekly - 2 sets - 10 reps  - Standing Shoulder Abduction Wall Slide with Thumb Out  - 2 x daily - 7 x weekly - 2 sets - 10 reps    Charges: 3 TherEx        Total Timed Treatment: 45 min  Total Treatment Time: 45 min

## 2024-04-08 ENCOUNTER — OFFICE VISIT (OUTPATIENT)
Dept: PHYSICAL THERAPY | Age: 49
End: 2024-04-08
Attending: NURSE PRACTITIONER
Payer: COMMERCIAL

## 2024-04-08 PROCEDURE — 97110 THERAPEUTIC EXERCISES: CPT

## 2024-04-08 PROCEDURE — 97140 MANUAL THERAPY 1/> REGIONS: CPT

## 2024-04-08 NOTE — PROGRESS NOTES
Diagnosis:   Acute pain of right shoulder (M25.511)       Referring Provider: Matthew  Date of Evaluation:    2/21/2024    Precautions:  None Next MD visit:   none scheduled  Date of Surgery: n/a   Insurance Primary/Secondary: [employer] [place] / N/A     # Auth Visits: 5             Subjective: the tension has come down a lot since last time. I'm not having any pain today, just having some pain with sleeping    Pain: 0/10     Objective: Right Shoulder AROM  Flexion: 140 (start of session, pain through last bit of range) 152(pain at end range, U shaped around GH joint, after stretches),   Abduction: 150 (start of session, pain through last bit of range) 180 (mild pain at end range, none at end range after stretches)   IR: 10 inches from C7, 8 inches from C7 (following stretches)      Assessment: Patient tolerated session well. Patient presents to session without complaints of right shoulder pain. Patient reports that the pain seems to come on the most while she is trying to sleep and get comfortable, although she also reports that the pain comes and goes. Patient demonstrated decreased shoulder range of motion compared to previous session, with increased reports of posterior tightness at end range. Following completion on manual inferior and posterior capsule stretch, patient reported improvement of symptoms. HEP will remain the same, and patient was instructed to continue with hot/cold modalities as needed. Patient reports that in the recent weeks, she has had to stop taking Naproxen as she has been having testing done on her liver, and believes that this has caused her pain to increase.       Goals: (to be met in 10 visits)   Pt will report improved ability to sleep without waking due to shoulder pain   Pt will improve shoulder flexion AROM to 160 degrees pain free to be able to reach into overhead cabinets without pain or restriction MET  Pt will improve shoulder abduction AROM to >175 degrees painfree to improve ability  to don deodorant, don/doff shirts, and wash hair MET  Pt will increase shoulder AROM ER to 85 to be able to reach and fasten seatbelt MET  Pt will increase shoulder AROM IR to T7 to be able to reach in back pocket, tuck in shirt, and turn steering wheel without pain  Pt will improve shoulder strength throughout to 5/5 to improve function with lifting overhead   Pt will demonstrate increased mid/low trap strength to 4+/5 to promote improved shoulder mechanics and stabilization with lifting and reaching   Pt will be independent and compliant with comprehensive HEP to maintain progress achieved in PT MET    Plan: Continue with strengthening of the right shoulder and AROM.  Date: 2/23/2024  TX#: 2/5 Date:2/28/2024                 TX#: 3/5 Date:3/1/2024                 TX#: 4/5 Date:3/5/2024                 TX#: 5/5 progress note Date:3/14/2024   Tx#: 6/10 Date:4/1/2024  Tx#:7/10 Date:4/8/2024  Tx#: 8/10   TherEx- 40 min  Arm bike 3 min forward 3 min backward  Sidelying ER with towel 2 x 8  Seated IR with towel 2 x 10  Banded rows RTB 2 x 10  Towel slides into shoulder flexion 2 x 10  Towel slides into shoulder abduction 2 x 10  Behind the back IR AAROM* discontinued d/t increased pain  TherEx   Arm bike level 1, 3 min forward, 3 min backward  Towel slides into shoulder flexion 2 x 10  Towel slides into shoulder abduction 2 x 10  Banded rows GTB 2 x 10  Banded scapular retractions RTB 2 x 10   Posterior capsule stretch, discontinued d/t anterior shoulder pinching TherEx   Arm Bike level 1, 4 min forward, 4 min backward  Seated shoulder flexion to 90 degrees 2 x 10  Seated shoulder abduction to 90 degrees 2 x 10  Posterior capsule stretch 3 x 15 sec  Banded rows GTB 2 x 10  Banded scapular retractions RTB 2 x 10  Banded ER with YTB 2 x 10   TherEx   Arm Bike level 1, 4 min forward, 4 min backward  Seated shoulder flexion to 90 degrees 2 x 10  Seated shoulder abduction to 90 degrees 2 x 10   Posterior capsule stretch 3 x  15 sec holds  Banded rows GTB 2 x 10  Banded scapular retractions RTB 2 x 10  Banded ER with YTB 2 x 10    TherEx - 45 min  Arm bike level 1, 4 min forward, 4 min backward  Towel slides into shoulder flexion 2 x 10  Towel slides into shoulder abduction 2 x 10   Banded rows GTB 2 x 10  Banded scapular retractions RTB 2 x 10  Banded ER with RTB 2 x 10  Seated shoulder abduction to 90 deg 2 x 10   Seated shoulder flexion to 90 deg 2 x 10 1 lbs  Wall pushups with arms wide 2 x 10  TherEx  Arm bike level 1, 4 min forward, 4 min backward  Gentle upper trap stretch 3 x 30 sec ea side  Gentle levator scapulae stretch 3 x 30 sec ea side  Towel slides into shoulder flexion 2 x 10  Towel slides into shoulder abduction 2 x 10   Shoulder flexion to 90 deg 2 x 10 1 lbs  Shoulder abduction to 90 deg 2 x 10 1 lbs       TherEx - 30 min  Arm bike level 1, 4 min forward, 4 min backward  Towel slides into shoulder flexion 2 x 10   Posterior capsule stretch 3 x 30 sec   Towel slide rainbows shoulder abduction 2 x 10  Shoulder flexion to 90 deg 2 x 10  Shoulder abduction to 90 deg x 10    Cold pack for 5 minutes to right shoulder Grade 3 posterior and inferior mobs to right shoulder  Grade 3 posterior and inferior mobs to right shoulder 4 mins Grade 3 posterior and inferior mobs to right shoulder 5 mins  Cold pack for 8 min to right shoulder Hot pack to right shoulder for 5 min Manual Therapy 10 min  Grade III-IV posterior and inferior mobs to right shoulder   Behind the back IR with inferior glide x 10     Cold pack for 7 min to right shoulder Cold pack for 7 min to right shoulder   Hot pack to right shoulder 5 min             HEP: Access Code: [email or web address]  URL: [email or web address]  Date: 03/14/2024  Prepared by: Delio Wood    Program Notes  Coleman Gastelum    Exercises  - Supine Shoulder Flexion Extension AAROM with Dowel  - 2 x daily - 7 x weekly - 2 sets - 10 reps - 3 hold  - Doorway Pec Stretch at 60 Degrees  Abduction with Arm Straight  - 2 x daily - 7 x weekly - 2 sets - 5 reps - 10 hold  - Standing Shoulder Posterior Capsule Stretch  - 2 x daily - 7 x weekly - 3 sets - 15 hold  - Standing Shoulder Flexion to 90 Degrees  - 2 x daily - 7 x weekly - 2 sets - 10 reps  - Shoulder Abduction - Thumbs Up  - 2 x daily - 7 x weekly - 2 sets - 10 reps  - Standing Shoulder Row with Anchored Resistance  - 2 x daily - 7 x weekly - 2 sets - 8 reps  - Shoulder extension with resistance - Neutral  - 2 x daily - 7 x weekly - 2 sets - 10 reps  - Shoulder Internal Rotation with Resistance  - 2 x daily - 7 x weekly - 2 sets - 10 reps  - Shoulder External Rotation with Anchored Resistance  - 2 x daily - 7 x weekly - 2 sets - 10 reps  - Standing Shoulder Abduction Wall Slide with Thumb Out  - 2 x daily - 7 x weekly - 2 sets - 10 reps    Charges: 2 TherEx   1 MT    Total Timed Treatment: 45 min  Total Treatment Time: 45 min

## 2024-04-15 ENCOUNTER — OFFICE VISIT (OUTPATIENT)
Dept: PHYSICAL THERAPY | Age: 49
End: 2024-04-15
Attending: NURSE PRACTITIONER
Payer: COMMERCIAL

## 2024-04-15 PROCEDURE — 97140 MANUAL THERAPY 1/> REGIONS: CPT

## 2024-04-15 PROCEDURE — 97110 THERAPEUTIC EXERCISES: CPT

## 2024-04-15 NOTE — PROGRESS NOTES
Diagnosis:   Acute pain of right shoulder (M25.511)       Referring Provider: Matthew  Date of Evaluation:    2/21/2024    Precautions:  None Next MD visit:   none scheduled  Date of Surgery: n/a   Insurance Primary/Secondary: [employer] [place] / N/A     # Auth Visits: 5             Subjective: the tension has come down a lot since last time. I'm not having any pain today, just having some pain with sleeping    Pain: 3/10 posterior shoulder    Objective: Right Shoulder AROM  Flexion: 155 (following arm bike, no pain) 162( after stretches, no pain)   Abduction: 180 (following arm bike, no pain )   IR: 8 inches from C7 (following arm bike, slight pain at end range), 8 inches from C7 (after stretches/MT, decreased pain)      Assessment: Patient tolerated session well. Patient presents to session with mild complaints of right posterior shoulder pain. Patient demonstrated improvement with right shoulder range of motion, following completion of arm bike warm up. Provided education of the rotator cuff muscles, anatomy and actions. Patient verbalized understanding of how certain muscles could be causing her limitations. Focus of session was on IR stretches and manual therapy to improve functional mobility. Patient will complete liver biopsy at the end of the week, and then will be able to return to her normal medications. Discussion about plan of care. Patient is in agreement.        Goals: (to be met in 10 visits)   Pt will report improved ability to sleep without waking due to shoulder pain   Pt will improve shoulder flexion AROM to 160 degrees pain free to be able to reach into overhead cabinets without pain or restriction MET  Pt will improve shoulder abduction AROM to >175 degrees painfree to improve ability to don deodorant, don/doff shirts, and wash hair MET  Pt will increase shoulder AROM ER to 85 to be able to reach and fasten seatbelt MET  Pt will increase shoulder AROM IR to T7 to be able to reach in back pocket, tuck in  shirt, and turn steering wheel without pain  Pt will improve shoulder strength throughout to 5/5 to improve function with lifting overhead   Pt will demonstrate increased mid/low trap strength to 4+/5 to promote improved shoulder mechanics and stabilization with lifting and reaching   Pt will be independent and compliant with comprehensive HEP to maintain progress achieved in PT MET    Plan: Continue with strengthening of the right shoulder and AROM. Manual therapy, mobilizations as needed  Date: 2/23/2024  TX#: 2/5 Date:2/28/2024                 TX#: 3/5 Date:3/1/2024                 TX#: 4/5 Date:3/5/2024                 TX#: 5/5 progress note Date:3/14/2024   Tx#: 6/10 Date:4/1/2024  Tx#:7/10 Date:4/8/2024  Tx#: 8/10 Date:4/15/2024  Tx#:9/10   TherEx- 40 min  Arm bike 3 min forward 3 min backward  Sidelying ER with towel 2 x 8  Seated IR with towel 2 x 10  Banded rows RTB 2 x 10  Towel slides into shoulder flexion 2 x 10  Towel slides into shoulder abduction 2 x 10  Behind the back IR AAROM* discontinued d/t increased pain  TherEx   Arm bike level 1, 3 min forward, 3 min backward  Towel slides into shoulder flexion 2 x 10  Towel slides into shoulder abduction 2 x 10  Banded rows GTB 2 x 10  Banded scapular retractions RTB 2 x 10   Posterior capsule stretch, discontinued d/t anterior shoulder pinching TherEx   Arm Bike level 1, 4 min forward, 4 min backward  Seated shoulder flexion to 90 degrees 2 x 10  Seated shoulder abduction to 90 degrees 2 x 10  Posterior capsule stretch 3 x 15 sec  Banded rows GTB 2 x 10  Banded scapular retractions RTB 2 x 10  Banded ER with YTB 2 x 10   TherEx   Arm Bike level 1, 4 min forward, 4 min backward  Seated shoulder flexion to 90 degrees 2 x 10  Seated shoulder abduction to 90 degrees 2 x 10   Posterior capsule stretch 3 x 15 sec holds  Banded rows GTB 2 x 10  Banded scapular retractions RTB 2 x 10  Banded ER with YTB 2 x 10    TherEx - 45 min  Arm bike level 1, 4 min forward, 4  min backward  Towel slides into shoulder flexion 2 x 10  Towel slides into shoulder abduction 2 x 10   Banded rows GTB 2 x 10  Banded scapular retractions RTB 2 x 10  Banded ER with RTB 2 x 10  Seated shoulder abduction to 90 deg 2 x 10   Seated shoulder flexion to 90 deg 2 x 10 1 lbs  Wall pushups with arms wide 2 x 10  TherEx  Arm bike level 1, 4 min forward, 4 min backward  Gentle upper trap stretch 3 x 30 sec ea side  Gentle levator scapulae stretch 3 x 30 sec ea side  Towel slides into shoulder flexion 2 x 10  Towel slides into shoulder abduction 2 x 10   Shoulder flexion to 90 deg 2 x 10 1 lbs  Shoulder abduction to 90 deg 2 x 10 1 lbs       TherEx - 30 min  Arm bike level 1, 4 min forward, 4 min backward  Towel slides into shoulder flexion 2 x 10   Posterior capsule stretch 3 x 30 sec   Towel slide rainbows shoulder abduction 2 x 10  Shoulder flexion to 90 deg 2 x 10  Shoulder abduction to 90 deg x 10  TherEx - 30 min  Arm bike level 1, 4 min forward, 4 min backward  Posterior capsule stretch 6 x 10 sec holds  Sleeper stretch 4 x 20 sec holds   Shoulder flexion to 90 deg 2 x 10  Shoulder abduction to 90 deg x 10   Behind the back IR with strap x 10, 5 sec holds  Education about anatomy of shoulder, rotator cuff muscles and actions.     Cold pack for 5 minutes to right shoulder Grade 3 posterior and inferior mobs to right shoulder  Grade 3 posterior and inferior mobs to right shoulder 4 mins Grade 3 posterior and inferior mobs to right shoulder 5 mins  Cold pack for 8 min to right shoulder Hot pack to right shoulder for 5 min Manual Therapy 10 min  Grade III-IV posterior and inferior mobs to right shoulder   Behind the back IR with inferior glide x 10 Manual Therapy 15 min  Grade III-IV posterior and inferior mobs to right shoulder   Behind the back IR with inferior glide x 10  STM to infraspinatus      Cold pack for 7 min to right shoulder Cold pack for 7 min to right shoulder   Hot pack to right shoulder 5  min  Hot pack to right shoulder 8 min              HEP: Access Code: [email or web address]  URL: [email or web address]  Date: 04/15/2024  Prepared by: Delio Wood    Program Notes  Coleman Gastelum    Exercises  - Sleeper Stretch  - 2 x daily - 7 x weekly - 4 sets - 25 hold  - Doorway Pec Stretch at 60 Degrees Abduction with Arm Straight  - 2 x daily - 7 x weekly - 2 sets - 5 reps - 10 hold  - Standing Shoulder Posterior Capsule Stretch  - 2 x daily - 7 x weekly - 3 sets - 15 hold  - Standing Shoulder Flexion to 90 Degrees  - 2 x daily - 7 x weekly - 2 sets - 10 reps  - Shoulder Abduction - Thumbs Up  - 2 x daily - 7 x weekly - 2 sets - 10 reps  - Standing Shoulder Row with Anchored Resistance  - 2 x daily - 7 x weekly - 2 sets - 8 reps  - Shoulder extension with resistance - Neutral  - 2 x daily - 7 x weekly - 2 sets - 10 reps  - Shoulder Internal Rotation with Resistance  - 2 x daily - 7 x weekly - 2 sets - 10 reps  - Shoulder External Rotation with Anchored Resistance  - 2 x daily - 7 x weekly - 2 sets - 10 reps  - Supine Shoulder Flexion Extension AAROM with Dowel  - 2 x daily - 7 x weekly - 2 sets - 10 reps - 3 hold  - Standing Shoulder Abduction Wall Slide with Thumb Out  - 2 x daily - 7 x weekly - 2 sets - 10 reps  - Seated Gentle Upper Trapezius Stretch  - 2 x daily - 7 x weekly - 3 sets - 30 hold  - Gentle Levator Scapulae Stretch  - 2 x daily - 7 x weekly - 3 sets - 30 hold    Charges: 2 TherEx   1 MT    Total Timed Treatment: 45 + 8 min  Total Treatment Time: 53 min

## 2024-04-19 ENCOUNTER — LAB ENCOUNTER (OUTPATIENT)
Dept: LAB | Age: 49
End: 2024-04-19
Attending: INTERNAL MEDICINE
Payer: COMMERCIAL

## 2024-04-19 DIAGNOSIS — R79.89 ABNORMAL LIVER FUNCTION TESTS: Primary | ICD-10-CM

## 2024-04-19 LAB
ALBUMIN SERPL-MCNC: 4.1 G/DL (ref 3.4–5)
ALP LIVER SERPL-CCNC: 111 U/L
ALT SERPL-CCNC: 75 U/L
AST SERPL-CCNC: 27 U/L (ref 15–37)
BASOPHILS # BLD AUTO: 0.05 X10(3) UL (ref 0–0.2)
BASOPHILS NFR BLD AUTO: 1.1 %
BILIRUB DIRECT SERPL-MCNC: 0.2 MG/DL (ref 0–0.2)
BILIRUB SERPL-MCNC: 0.9 MG/DL (ref 0.1–2)
CK SERPL-CCNC: 89 U/L
EOSINOPHIL # BLD AUTO: 0.12 X10(3) UL (ref 0–0.7)
EOSINOPHIL NFR BLD AUTO: 2.5 %
ERYTHROCYTE [DISTWIDTH] IN BLOOD BY AUTOMATED COUNT: 13 %
GGT SERPL-CCNC: 47 U/L
HCT VFR BLD AUTO: 43.1 %
HGB BLD-MCNC: 13.9 G/DL
IMM GRANULOCYTES # BLD AUTO: 0.01 X10(3) UL (ref 0–1)
IMM GRANULOCYTES NFR BLD: 0.2 %
INR BLD: 0.99 (ref 0.8–1.2)
LYMPHOCYTES # BLD AUTO: 1.39 X10(3) UL (ref 1–4)
LYMPHOCYTES NFR BLD AUTO: 29.3 %
MCH RBC QN AUTO: 26.3 PG (ref 26–34)
MCHC RBC AUTO-ENTMCNC: 32.3 G/DL (ref 31–37)
MCV RBC AUTO: 81.5 FL
MONOCYTES # BLD AUTO: 0.33 X10(3) UL (ref 0.1–1)
MONOCYTES NFR BLD AUTO: 7 %
NEUTROPHILS # BLD AUTO: 2.84 X10 (3) UL (ref 1.5–7.7)
NEUTROPHILS # BLD AUTO: 2.84 X10(3) UL (ref 1.5–7.7)
NEUTROPHILS NFR BLD AUTO: 59.9 %
PLATELET # BLD AUTO: 175 10(3)UL (ref 150–450)
PROT SERPL-MCNC: 7.4 G/DL (ref 6.4–8.2)
PROTHROMBIN TIME: 13.1 SECONDS (ref 11.6–14.8)
RBC # BLD AUTO: 5.29 X10(6)UL
WBC # BLD AUTO: 4.7 X10(3) UL (ref 4–11)

## 2024-04-19 PROCEDURE — 80076 HEPATIC FUNCTION PANEL: CPT

## 2024-04-19 PROCEDURE — 82550 ASSAY OF CK (CPK): CPT

## 2024-04-19 PROCEDURE — 85610 PROTHROMBIN TIME: CPT

## 2024-04-19 PROCEDURE — 82977 ASSAY OF GGT: CPT

## 2024-04-19 PROCEDURE — 85025 COMPLETE CBC W/AUTO DIFF WBC: CPT

## 2024-04-19 PROCEDURE — 84075 ASSAY ALKALINE PHOSPHATASE: CPT

## 2024-04-19 PROCEDURE — 36415 COLL VENOUS BLD VENIPUNCTURE: CPT

## 2024-04-19 PROCEDURE — 84080 ASSAY ALKALINE PHOSPHATASES: CPT

## 2024-04-22 ENCOUNTER — ORDER TRANSCRIPTION (OUTPATIENT)
Dept: ADMINISTRATIVE | Facility: HOSPITAL | Age: 49
End: 2024-04-22

## 2024-04-22 ENCOUNTER — OFFICE VISIT (OUTPATIENT)
Dept: PHYSICAL THERAPY | Age: 49
End: 2024-04-22
Attending: NURSE PRACTITIONER
Payer: COMMERCIAL

## 2024-04-22 DIAGNOSIS — R79.89 ELEVATED LIVER FUNCTION TESTS: Primary | ICD-10-CM

## 2024-04-22 LAB
ALK PHOSPHATASE: 114 IU/L
BONE FRAC: 68 %
INTESTINAL FRAC: 0 %
LIVER FRAC: 32 %

## 2024-04-22 PROCEDURE — 97110 THERAPEUTIC EXERCISES: CPT

## 2024-04-22 PROCEDURE — 97140 MANUAL THERAPY 1/> REGIONS: CPT

## 2024-04-22 NOTE — PROGRESS NOTES
Diagnosis:   Acute pain of right shoulder (M25.511)       Referring Provider: Matthew  Date of Evaluation:    2/21/2024    Precautions:  None Next MD visit:   none scheduled  Date of Surgery: n/a   Insurance Primary/Secondary: [employer] / N/A     # Auth Visits: 5            Progress Summary  Pt has attended 10 visits in Physical Therapy.     Subjective: I feel good during the day at rest and during my work day. I'm still have difficulty with sleeping. My doctor wants to do a liver biopsy, so I am still unable to take any medications. I plan to get that done as soon as I can.     Pain: 0/10 posterior shoulder at rest    Objective: Right Shoulder AROM  Flexion: 160 (at start of session, 3/10 on way up) 174( after stretches, mild pain)   Abduction: 180 (at start of session, slight pinch)   ER: 85 slight pull at end range on posterior aspect of shoulder   IR in supine position: 70 ( slight pull at end range on posterior aspect of shoulder   IR: 9 inches from C7 (at start of session, 10/10 strong pain once on posterior aspect of right hip), 8 inches from C7 (after stretches/MT, decreased pain)        Assessment: Patient tolerated session well. Patient presents to session with no complaints of right posterior shoulder pain at rest and at the beginning of the session. Patient demonstrated improvement with flexion of the right shoulder and continues to demonstrate full AROM of right abduction. Patient continues to be limited by severe pain with right IR, both functionally behind the back and in a supine testing position. Patient demonstrated improvement of shoulder range following completion of MT. Therapist and patient discussed future plan of care, and patient would like to hold off on therapy until after liver biopsy in hopes that she will be able to return to normal medication regimen. Therapist is in agreement, and instructed patient to continued with current HEP, focusing on stretching and to reach out if anything  comes up.     Goals: (to be met in 10 visits)   Pt will report improved ability to sleep without waking due to shoulder pain   Pt will improve shoulder flexion AROM to 160 degrees pain free to be able to reach into overhead cabinets without pain or restriction MET  Pt will improve shoulder abduction AROM to >175 degrees painfree to improve ability to don deodorant, don/doff shirts, and wash hair MET  Pt will increase shoulder AROM ER to 85 to be able to reach and fasten seatbelt MET  Pt will increase shoulder AROM IR to T7 to be able to reach in back pocket, tuck in shirt, and turn steering wheel without pain  Pt will improve shoulder strength throughout to 5/5 to improve function with lifting overhead   Pt will demonstrate increased mid/low trap strength to 4+/5 to promote improved shoulder mechanics and stabilization with lifting and reaching   Pt will be independent and compliant with comprehensive HEP to maintain progress achieved in PT MET  QuickDASH Outcome Score  Score: 40.91 % (4/22/2024  9:55 AM)      Plan: Continue skilled Physical Therapy 1 x/week or a total of 5 visits over a 90 day period. Treatment will include: Scapular, rotator cuff and posterior muscle strengthening, stretching, MT for mobility, and modalities.       Patient/Family/Caregiver was advised of these findings, precautions, and treatment options and has agreed to actively participate in planning and for this course of care.    Thank you for your referral. If you have any questions, please contact me at Dept: [phone].    Sincerely,  Electronically signed by therapist: Delio Wood, PT     Physician's certification required:  Yes  Please co-sign or sign and return this letter via fax as soon as possible to [phone].   I certify the need for these services furnished under this plan of treatment and while under my care.    X___________________________________________________ Date____________________    Certification From:  4/22/2024  To:7/21/2024     Plan: Continue with strengthening of the right shoulder and AROM. Manual therapy, mobilizations as needed.   Date: 2/23/2024  TX#: 2/5 Date:2/28/2024                 TX#: 3/5 Date:3/1/2024                 TX#: 4/5 Date:3/5/2024                 TX#: 5/5 progress note Date:3/14/2024   Tx#: 6/10 Date:4/1/2024  Tx#:7/10 Date:4/8/2024  Tx#: 8/10 Date:4/15/2024  Tx#:9/10 Date:4/22/2024  Tx#:10/10 Progress Note   TherEx- 40 min  Arm bike 3 min forward 3 min backward  Sidelying ER with towel 2 x 8  Seated IR with towel 2 x 10  Banded rows RTB 2 x 10  Towel slides into shoulder flexion 2 x 10  Towel slides into shoulder abduction 2 x 10  Behind the back IR AAROM* discontinued d/t increased pain  TherEx   Arm bike level 1, 3 min forward, 3 min backward  Towel slides into shoulder flexion 2 x 10  Towel slides into shoulder abduction 2 x 10  Banded rows GTB 2 x 10  Banded scapular retractions RTB 2 x 10   Posterior capsule stretch, discontinued d/t anterior shoulder pinching TherEx   Arm Bike level 1, 4 min forward, 4 min backward  Seated shoulder flexion to 90 degrees 2 x 10  Seated shoulder abduction to 90 degrees 2 x 10  Posterior capsule stretch 3 x 15 sec  Banded rows GTB 2 x 10  Banded scapular retractions RTB 2 x 10  Banded ER with YTB 2 x 10   TherEx   Arm Bike level 1, 4 min forward, 4 min backward  Seated shoulder flexion to 90 degrees 2 x 10  Seated shoulder abduction to 90 degrees 2 x 10   Posterior capsule stretch 3 x 15 sec holds  Banded rows GTB 2 x 10  Banded scapular retractions RTB 2 x 10  Banded ER with YTB 2 x 10    TherEx - 45 min  Arm bike level 1, 4 min forward, 4 min backward  Towel slides into shoulder flexion 2 x 10  Towel slides into shoulder abduction 2 x 10   Banded rows GTB 2 x 10  Banded scapular retractions RTB 2 x 10  Banded ER with RTB 2 x 10  Seated shoulder abduction to 90 deg 2 x 10   Seated shoulder flexion to 90 deg 2 x 10 1 lbs  Wall pushups with arms wide 2 x  10  TherEx  Arm bike level 1, 4 min forward, 4 min backward  Gentle upper trap stretch 3 x 30 sec ea side  Gentle levator scapulae stretch 3 x 30 sec ea side  Towel slides into shoulder flexion 2 x 10  Towel slides into shoulder abduction 2 x 10   Shoulder flexion to 90 deg 2 x 10 1 lbs  Shoulder abduction to 90 deg 2 x 10 1 lbs       TherEx - 30 min  Arm bike level 1, 4 min forward, 4 min backward  Towel slides into shoulder flexion 2 x 10   Posterior capsule stretch 3 x 30 sec   Towel slide rainbows shoulder abduction 2 x 10  Shoulder flexion to 90 deg 2 x 10  Shoulder abduction to 90 deg x 10  TherEx - 30 min  Arm bike level 1, 4 min forward, 4 min backward  Posterior capsule stretch 6 x 10 sec holds  Sleeper stretch 4 x 20 sec holds   Shoulder flexion to 90 deg 2 x 10  Shoulder abduction to 90 deg x 10   Behind the back IR with strap x 10, 5 sec holds  Education about anatomy of shoulder, rotator cuff muscles and actions.   TherEx 30 min  Arm bike level 1, 2 min forward, 2 min backward  Posterior capsule stretch 6 x 10 sec holds  Sleeper stretch 4 x 20  Towel slides into shoulder flexion 2 x 10  Education and answered questions about plan of care, continued education on anatomy, medication.     Cold pack for 5 minutes to right shoulder Grade 3 posterior and inferior mobs to right shoulder  Grade 3 posterior and inferior mobs to right shoulder 4 mins Grade 3 posterior and inferior mobs to right shoulder 5 mins  Cold pack for 8 min to right shoulder Hot pack to right shoulder for 5 min Manual Therapy 10 min  Grade III-IV posterior and inferior mobs to right shoulder   Behind the back IR with inferior glide x 10 Manual Therapy 15 min  Grade III-IV posterior and inferior mobs to right shoulder   Behind the back IR with inferior glide x 10  STM to infraspinatus  Manual Therapy 15 min  Grade III-IV posterior and inferior mobs to right shoulder   Behind the back IR with inferior glide x 10  STM to infraspinatus       Cold pack for 7 min to right shoulder Cold pack for 7 min to right shoulder   Hot pack to right shoulder 5 min  Hot pack to right shoulder 8 min  Cold pack to right shoulder 10 min              HEP: Access Code: [email or web address]  URL: [email or web address]  Date: 04/15/2024  Prepared by: Delio Wood    Program Notes  Coleman Gastelum    Exercises  - Sleeper Stretch  - 2 x daily - 7 x weekly - 4 sets - 25 hold  - Doorway Pec Stretch at 60 Degrees Abduction with Arm Straight  - 2 x daily - 7 x weekly - 2 sets - 5 reps - 10 hold  - Standing Shoulder Posterior Capsule Stretch  - 2 x daily - 7 x weekly - 3 sets - 15 hold  - Standing Shoulder Flexion to 90 Degrees  - 2 x daily - 7 x weekly - 2 sets - 10 reps  - Shoulder Abduction - Thumbs Up  - 2 x daily - 7 x weekly - 2 sets - 10 reps  - Standing Shoulder Row with Anchored Resistance  - 2 x daily - 7 x weekly - 2 sets - 8 reps  - Shoulder extension with resistance - Neutral  - 2 x daily - 7 x weekly - 2 sets - 10 reps  - Shoulder Internal Rotation with Resistance  - 2 x daily - 7 x weekly - 2 sets - 10 reps  - Shoulder External Rotation with Anchored Resistance  - 2 x daily - 7 x weekly - 2 sets - 10 reps  - Supine Shoulder Flexion Extension AAROM with Dowel  - 2 x daily - 7 x weekly - 2 sets - 10 reps - 3 hold  - Standing Shoulder Abduction Wall Slide with Thumb Out  - 2 x daily - 7 x weekly - 2 sets - 10 reps  - Seated Gentle Upper Trapezius Stretch  - 2 x daily - 7 x weekly - 3 sets - 30 hold  - Gentle Levator Scapulae Stretch  - 2 x daily - 7 x weekly - 3 sets - 30 hold    Charges: 2 TherEx   1 MT    Total Timed Treatment: 45 min  Total Treatment Time: 45 min

## 2024-05-09 ENCOUNTER — TELEPHONE (OUTPATIENT)
Dept: FAMILY MEDICINE CLINIC | Facility: CLINIC | Age: 49
End: 2024-05-09

## 2024-05-09 NOTE — TELEPHONE ENCOUNTER
Request for History & Physical for Radiology Procedure at [facility]     The above patient is scheduled to have a procedure performed in Radiology.  In order for the procedure to be performed safely, a comprehensive History & Physical, to include the Review of Systems, is required within 30 days of the scheduled appointment.       Procedure:    Date Scheduled:                 Ordered by (Ordering Physician):  Dr Abdalla     Please FAX the completed H&P to [facility] at [phone].  For Questions: Call [facility] [phone]     for pt paperwork in back tickler

## 2024-05-10 ENCOUNTER — OFFICE VISIT (OUTPATIENT)
Dept: FAMILY MEDICINE CLINIC | Facility: CLINIC | Age: 49
End: 2024-05-10
Payer: COMMERCIAL

## 2024-05-10 VITALS
BODY MASS INDEX: 26.7 KG/M2 | RESPIRATION RATE: 16 BRPM | WEIGHT: 166.13 LBS | SYSTOLIC BLOOD PRESSURE: 117 MMHG | DIASTOLIC BLOOD PRESSURE: 74 MMHG | HEART RATE: 56 BPM | HEIGHT: 66 IN

## 2024-05-10 DIAGNOSIS — Z01.810 PREOP CARDIOVASCULAR EXAM: Primary | ICD-10-CM

## 2024-05-10 LAB
ATRIAL RATE: 56 BPM
P AXIS: 11 DEGREES
P-R INTERVAL: 174 MS
Q-T INTERVAL: 430 MS
QRS DURATION: 100 MS
QTC CALCULATION (BEZET): 414 MS
R AXIS: 71 DEGREES
T AXIS: 48 DEGREES
VENTRICULAR RATE: 56 BPM

## 2024-05-10 PROCEDURE — 93000 ELECTROCARDIOGRAM COMPLETE: CPT | Performed by: STUDENT IN AN ORGANIZED HEALTH CARE EDUCATION/TRAINING PROGRAM

## 2024-05-10 PROCEDURE — 3074F SYST BP LT 130 MM HG: CPT | Performed by: STUDENT IN AN ORGANIZED HEALTH CARE EDUCATION/TRAINING PROGRAM

## 2024-05-10 PROCEDURE — 3078F DIAST BP <80 MM HG: CPT | Performed by: STUDENT IN AN ORGANIZED HEALTH CARE EDUCATION/TRAINING PROGRAM

## 2024-05-10 PROCEDURE — 99214 OFFICE O/P EST MOD 30 MIN: CPT | Performed by: STUDENT IN AN ORGANIZED HEALTH CARE EDUCATION/TRAINING PROGRAM

## 2024-05-10 PROCEDURE — 3008F BODY MASS INDEX DOCD: CPT | Performed by: STUDENT IN AN ORGANIZED HEALTH CARE EDUCATION/TRAINING PROGRAM

## 2024-05-10 NOTE — PROGRESS NOTES
Coleman Gastelum is a 48 year old female who presents for a pre-operative physical exam. Patient is to have ultrasound-guided biopsy of the liver, to be done by Dr. Abdalla at [facility] on 5/14/24.    Pt has had previous anesthesia:  yes.  Previous complications:  No  Hx of DVT/PE:  No    Patient presents at the request of their surgeon and the notes/results will be faxed after the visit.    HPI:   Patient's history includes:    1) High Risk Cardiac Conditions   1) Recent MI - no   2) Decompensated Heart Failure - no   3) Unstable angina - no   4) Symptomatic arrythmia - no   5) Sx Valvular Disease - no    Does have hx of asthma, very rarely needs albuterol, last used it about 5 years ago.    2) Intermediate Risk Factors - GFR 55     2) Functional Status - > 4 mets    3) Surgery Specific Risk - low risk    4) Further Noninvasive evaluation    1) EKG - ordered d/t age >40, sinus bradycardia without Tw changes    5) Need for medical therapy - Beta Blocker, Statins indicated ? no    Current Outpatient Medications   Medication Sig Dispense Refill    naproxen 500 MG Oral Tab Take 1 tablet (500 mg total) by mouth 2 (two) times daily with meals. 10 tablet 0    albuterol 108 (90 Base) MCG/ACT Inhalation Aero Soln Inhale 2 puffs into the lungs every 6 (six) hours as needed for Wheezing (cough). 18 g 0    OMEPRAZOLE 20 MG Oral Capsule Delayed Release TAKE ONE CAPSULE BY MOUTH EVERY DAY (Patient taking differently: Taking as needed) 30 capsule 0      Allergies: No Known Allergies   Past Medical History:    Abdominal pain    Amenorrhea    IUD    Asthma (HCC)    Back pain    Bloating    Body piercing    Calculus of kidney    Constipation    Dysmenorrhea    Flatulence/gas pain/belching    GERD (gastroesophageal reflux disease)    Headache disorder    Heartburn    Irregular bowel habits    Visual impairment    glasses    Wears glasses      Past Surgical History:   Procedure Laterality Date    Colonoscopy      Pain  management Left 2021    LEFT LUMBAR 5 TRANSFORAMINAL EPIDURAL STEROID INJECTION SINGLE LEVEL      Family History   Problem Relation Age of Onset    Hypertension Mother         On high blood pressure medication    Hypertension Father         On high blood pressure medication    Breast Cancer Maternal Aunt         She  in the ’s      Social History:   Social History     Socioeconomic History    Marital status:    Occupational History    Occupation: supervisor   Tobacco Use    Smoking status: Never    Smokeless tobacco: Never   Vaping Use    Vaping status: Never Used   Substance and Sexual Activity    Alcohol use: Not Currently     Alcohol/week: 1.0 standard drink of alcohol     Types: 1 Glasses of wine per week     Comment: I rarely drink, maybe 1-2 a month a glass of wine    Drug use: Never    Sexual activity: Yes     Partners: Male   Other Topics Concern    Caffeine Concern Yes     Comment: 1 cup coffee daily    Sleep Concern No    Exercise Yes     Comment: 3-5 days a week    Seat Belt Yes    Self-Exams Yes     Comment: self breast exam monthly         REVIEW OF SYSTEMS:   GENERAL: feels well otherwise  LUNGS: denies shortness of breath  CARDIOVASCULAR: denies chest pain  GI: denies abdominal pain  : denies dysuria  NEURO: denies headaches    EXAM:   /74   Pulse 56   Resp 16   Ht 5' 6\" (1.676 m)   Wt 166 lb 2 oz (75.4 kg)   BMI 26.81 kg/m²   GENERAL: well developed, well nourished,in no apparent distress  HEENT: atraumatic, normocephalic, O/P clear  NECK: supple,no adenopathy  LUNGS: clear to auscultation  CARDIO: RRR without murmur  GI: good BS's,no masses, HSM or tenderness  EXTREMITIES: no edema  NEURO: Alert    Labs:  Hgb: reviewed, nl last month  WBC: 4.7, done on 2024.  HGB: 13.9, done on 2024.  PLT: 175, done on 2024.      Glucose: 90, done on 2023.  Cr: 1.22, done on 2023.  GFR(AA): 78, done on 2021.  GFR (non-AA): 68, done on  11/11/2021.  CA: 9.8, done on 12/20/2023.  Na: 141, done on 12/20/2023.  K: 4.5, done on 12/20/2023.  Cl: 110, done on 12/20/2023.  CO2: 28, done on 12/20/2023.  Last ALB was 4.1% done on 4/19/2024.        ASSESSMENT AND PLAN:   Coleman Gastelum is a 48 year old female who presents for a pre-operative physical exam.     1. Preop Evaluation. I have independently evaluated patient.  Patient is a 48yoF  who is low risk for a low risk surgery.  There are no modifiable risk factors (smoking, etc). EKG sinus emmanuel.    I spent a total of 30 minutes in both face-to-face and non-face-to-face activities for this visit on the date of this encounter.

## 2024-05-14 ENCOUNTER — NURSE ONLY (OUTPATIENT)
Dept: LAB | Facility: HOSPITAL | Age: 49
End: 2024-05-14
Attending: INTERNAL MEDICINE
Payer: COMMERCIAL

## 2024-05-14 ENCOUNTER — HOSPITAL ENCOUNTER (OUTPATIENT)
Dept: ULTRASOUND IMAGING | Facility: HOSPITAL | Age: 49
Discharge: HOME | End: 2024-05-14
Attending: INTERNAL MEDICINE
Payer: COMMERCIAL

## 2024-05-14 VITALS
DIASTOLIC BLOOD PRESSURE: 90 MMHG | BODY MASS INDEX: 26.36 KG/M2 | RESPIRATION RATE: 16 BRPM | HEIGHT: 66 IN | TEMPERATURE: 97 F | OXYGEN SATURATION: 100 % | HEART RATE: 111 BPM | WEIGHT: 164 LBS | SYSTOLIC BLOOD PRESSURE: 114 MMHG

## 2024-05-14 DIAGNOSIS — R79.89 ELEVATED LFTS: Primary | ICD-10-CM

## 2024-05-14 DIAGNOSIS — R79.89 ELEVATED LIVER FUNCTION TESTS: ICD-10-CM

## 2024-05-14 DIAGNOSIS — R79.89 ELEVATED LFTS: ICD-10-CM

## 2024-05-14 LAB
HCG UR QL: NEGATIVE
INR BLD: 0.93 (ref 0.8–1.2)
PROTHROMBIN TIME: 12.5 SECONDS (ref 11.6–14.8)

## 2024-05-14 PROCEDURE — 76942 ECHO GUIDE FOR BIOPSY: CPT | Performed by: INTERNAL MEDICINE

## 2024-05-14 PROCEDURE — 36415 COLL VENOUS BLD VENIPUNCTURE: CPT

## 2024-05-14 PROCEDURE — 85610 PROTHROMBIN TIME: CPT

## 2024-05-14 PROCEDURE — 81025 URINE PREGNANCY TEST: CPT | Performed by: RADIOLOGY

## 2024-05-14 PROCEDURE — 47000 NEEDLE BIOPSY OF LIVER PERQ: CPT | Performed by: INTERNAL MEDICINE

## 2024-05-14 PROCEDURE — 88307 TISSUE EXAM BY PATHOLOGIST: CPT | Performed by: INTERNAL MEDICINE

## 2024-05-14 PROCEDURE — 88313 SPECIAL STAINS GROUP 2: CPT | Performed by: INTERNAL MEDICINE

## 2024-05-14 PROCEDURE — 99152 MOD SED SAME PHYS/QHP 5/>YRS: CPT | Performed by: INTERNAL MEDICINE

## 2024-05-14 RX ORDER — FLUMAZENIL 0.1 MG/ML
0.2 INJECTION INTRAVENOUS AS NEEDED
Status: DISCONTINUED | OUTPATIENT
Start: 2024-05-14 | End: 2024-05-16

## 2024-05-14 RX ORDER — NALOXONE HYDROCHLORIDE 0.4 MG/ML
INJECTION, SOLUTION INTRAMUSCULAR; INTRAVENOUS; SUBCUTANEOUS
Status: DISPENSED
Start: 2024-05-14 | End: 2024-05-14

## 2024-05-14 RX ORDER — MIDAZOLAM HYDROCHLORIDE 1 MG/ML
INJECTION INTRAMUSCULAR; INTRAVENOUS
Status: COMPLETED
Start: 2024-05-14 | End: 2024-05-14

## 2024-05-14 RX ORDER — MIDAZOLAM HYDROCHLORIDE 1 MG/ML
1 INJECTION INTRAMUSCULAR; INTRAVENOUS EVERY 5 MIN PRN
Status: DISCONTINUED | OUTPATIENT
Start: 2024-05-14 | End: 2024-05-16

## 2024-05-14 RX ORDER — SODIUM CHLORIDE 9 MG/ML
INJECTION, SOLUTION INTRAVENOUS CONTINUOUS
Status: DISCONTINUED | OUTPATIENT
Start: 2024-05-14 | End: 2024-05-16

## 2024-05-14 RX ORDER — FLUMAZENIL 0.1 MG/ML
INJECTION INTRAVENOUS
Status: DISPENSED
Start: 2024-05-14 | End: 2024-05-14

## 2024-05-14 RX ORDER — NALOXONE HYDROCHLORIDE 0.4 MG/ML
80 INJECTION, SOLUTION INTRAMUSCULAR; INTRAVENOUS; SUBCUTANEOUS AS NEEDED
Status: DISCONTINUED | OUTPATIENT
Start: 2024-05-14 | End: 2024-05-16

## 2024-05-14 RX ADMIN — MIDAZOLAM HYDROCHLORIDE 1 MG: 1 INJECTION INTRAMUSCULAR; INTRAVENOUS at 08:21:00

## 2024-05-14 RX ADMIN — SODIUM CHLORIDE: 9 INJECTION, SOLUTION INTRAVENOUS at 08:16:00

## 2024-05-14 NOTE — PROCEDURES
[facility]   part of [facility]  Procedure Note    Coleman Gastelum Patient Status:  Outpatient    1975 MRN [id number]   Location [facility] ULTRASOUND Attending Ren Abdalla MD   Hosp Day # 0 PCP Violetta Rosario DO     Procedure: Liver biopsy    Pre-Procedure Diagnosis:  Elevated liver enzymes    Post-Procedure Diagnosis: Same    Anesthesia:  Sedation    Findings:  No complication    Specimens: 3 18 gauge samples    Blood Loss:  < 5 cc    Tourniquet Time: None  Complications:  None  Drains:  None    Secondary Diagnosis:  N/A    Jordi Madera MD  2024

## 2024-05-14 NOTE — DISCHARGE INSTRUCTIONS
Home Care Instructions  [facility] Department of Radiology    Biopsy of any type    Dr. Madera    Have someone drive you home after your procedure.  You may not drive yourself home    AFTER YOU ARRIVE HOME    Take things easy for the rest of the day after your biopsy.  You may be sore in the biopsy area for one to five days  DO drink plenty of fluids  DO resume your regular diet  DO keep a bandage on the biopsy site for at least 24 hours  DO NOT take a hot bath or shower for at least 12 hours  DO NOT drive or operative machinery for at least 24 hours  DO NOT smoke for at least 24 hours  DO NOT do any strenuous exercise or lifting for at least 48 hours  DO call your doctor immediately if  You start bleeding  There is any change in color or temperature of the area where the biopsy was performed  You develop increasing pain in shortness of breath

## 2024-05-14 NOTE — PROGRESS NOTES
Pt arrived to room US room 4. Working with Shanita Blair, US Tech. 22 gauge angiocath to the right AC. Image guided liver biopsy completed by Dr. Madera.   Obtained biopsy. Specimen sent to Pathology.    Presently denies pain. Tolerated procedure well.   Transported pt to Rm 2253 accompanied by Thierry Del Castillo RN. Bedside report given to BRENDA Pettit.

## 2024-07-12 ENCOUNTER — TELEPHONE (OUTPATIENT)
Dept: FAMILY MEDICINE CLINIC | Facility: CLINIC | Age: 49
End: 2024-07-12

## 2024-07-12 ENCOUNTER — TELEPHONE (OUTPATIENT)
Dept: PHYSICAL THERAPY | Facility: HOSPITAL | Age: 49
End: 2024-07-12

## 2024-07-12 NOTE — TELEPHONE ENCOUNTER
Pt is calling she wants to know how long she has with her IUD if it was inserted on 10/08/2021. Please call to advise

## 2024-07-20 ENCOUNTER — HOSPITAL ENCOUNTER (OUTPATIENT)
Age: 49
Discharge: HOME | End: 2024-07-20
Payer: COMMERCIAL

## 2024-07-20 ENCOUNTER — APPOINTMENT (OUTPATIENT)
Dept: CT IMAGING | Facility: HOSPITAL | Age: 49
End: 2024-07-20
Attending: NURSE PRACTITIONER
Payer: COMMERCIAL

## 2024-07-20 VITALS
DIASTOLIC BLOOD PRESSURE: 88 MMHG | TEMPERATURE: 98 F | RESPIRATION RATE: 16 BRPM | HEART RATE: 65 BPM | OXYGEN SATURATION: 99 % | WEIGHT: 165 LBS | SYSTOLIC BLOOD PRESSURE: 131 MMHG | HEIGHT: 66 IN | BODY MASS INDEX: 26.52 KG/M2

## 2024-07-20 DIAGNOSIS — S09.90XA INJURY OF HEAD, INITIAL ENCOUNTER: Primary | ICD-10-CM

## 2024-07-20 PROCEDURE — 99213 OFFICE O/P EST LOW 20 MIN: CPT | Performed by: NURSE PRACTITIONER

## 2024-07-20 PROCEDURE — 70450 CT HEAD/BRAIN W/O DYE: CPT | Performed by: NURSE PRACTITIONER

## 2024-07-20 NOTE — ED INITIAL ASSESSMENT (HPI)
Pt was at a 28msec baseball game last night when a ball came off the [place] and hit her in the back of the head.  She did not lose consciousness, she is not nauseated or vomiting  just has a  headache and feels tired.  No visual changes , but a little dizzy

## 2024-07-20 NOTE — ED PROVIDER NOTES
Patient Seen in: Immediate Care [facility]      History     Chief Complaint   Patient presents with    Head Neck Injury     Stated Complaint: Head injury    Subjective:   HPI  48-year-old female who is not on aspirin or blood thinner who has asthma, kidney stones, GERD, and headaches presents complaining of dizziness after getting struck in the occipital portion of her head with a ball while at a baseball game last night.  She denies any loss of consciousness.  She denies any nausea or vomiting.  She denies any visual changes.  She did not take any medications for pain.    Objective:   Past Medical History:    Abdominal pain    Amenorrhea    IUD    Asthma (HCC)    Back pain    Bloating    Body piercing    Calculus of kidney    Constipation    Dysmenorrhea    Flatulence/gas pain/belching    GERD (gastroesophageal reflux disease)    Headache disorder    Heartburn    Irregular bowel habits    Visual impairment    glasses    Wears glasses              Past Surgical History:   Procedure Laterality Date    Colonoscopy      Pain management Left 06/01/2021    LEFT LUMBAR 5 TRANSFORAMINAL EPIDURAL STEROID INJECTION SINGLE LEVEL                Social History     Socioeconomic History    Marital status:    Occupational History    Occupation: supervisor   Tobacco Use    Smoking status: Never    Smokeless tobacco: Never   Vaping Use    Vaping status: Never Used   Substance and Sexual Activity    Alcohol use: Not Currently     Alcohol/week: 1.0 standard drink of alcohol     Types: 1 Glasses of wine per week     Comment: I rarely drink, maybe 1-2 a month a glass of wine    Drug use: Never    Sexual activity: Yes     Partners: Male   Other Topics Concern    Caffeine Concern Yes     Comment: 1 cup coffee daily    Sleep Concern No    Exercise Yes     Comment: 3-5 days a week    Seat Belt Yes    Self-Exams Yes     Comment: self breast exam monthly              Review of Systems   All other systems reviewed and are  negative.      Positive for stated Chief Complaint: Head Neck Injury    Other systems are as noted in HPI.  Constitutional and vital signs reviewed.      All other systems reviewed and negative except as noted above.    Physical Exam     ED Triage Vitals [07/20/24 0929]   /88   Pulse 65   Resp 16   Temp 98.3 °F (36.8 °C)   Temp src Temporal   SpO2 99 %   O2 Device None (Room air)       Current Vitals:   Vital Signs  BP: 131/88  Pulse: 65  Resp: 16  Temp: 98.3 °F (36.8 °C)  Temp src: Temporal    Oxygen Therapy  SpO2: 99 %  O2 Device: None (Room air)            Physical Exam  Vitals and nursing note reviewed.   Constitutional:       General: She is not in acute distress.     Appearance: She is well-developed. She is not ill-appearing or toxic-appearing.   HENT:      Head: Atraumatic.        Right Ear: Tympanic membrane, ear canal and external ear normal.      Left Ear: Tympanic membrane, ear canal and external ear normal.   Cardiovascular:      Rate and Rhythm: Normal rate and regular rhythm.      Heart sounds: Normal heart sounds.   Pulmonary:      Effort: Pulmonary effort is normal.      Breath sounds: Normal breath sounds.   Musculoskeletal:      Cervical back: Normal range of motion. No tenderness.   Skin:     General: Skin is warm and dry.   Neurological:      Mental Status: She is alert and oriented to person, place, and time.               ED Course   Labs Reviewed - No data to display        CT BRAIN OR HEAD (74896)    Result Date: 7/20/2024  PROCEDURE:  CT BRAIN OR HEAD (90268)  COMPARISON:  None.  INDICATIONS:  hit in back of head with baseball last night. dizziness today. no loc  TECHNIQUE:  Noncontrast CT scanning is performed through the brain. Dose reduction techniques were used. Dose information is transmitted to the ACR (American College of Radiology) NRDR (National Radiology Data Registry) which includes the Dose Index Registry.  PATIENT STATED HISTORY: (As transcribed by Technologist)      FINDINGS:  VENTRICLES/SULCI:  Ventricles and sulci are normal in size.  INTRACRANIAL:  There are no abnormal extraaxial fluid collections.  There is no midline shift.  There are no intraparenchymal brain abnormalities.  There is nothing specific for acute infarct.  There is no hemorrhage or mass lesion.  SINUSES:           No sign of acute sinusitis.  MASTOIDS:          No sign of acute inflammation. SKULL:             No evidence for fracture or osseous abnormality. OTHER:             None.            CONCLUSION:  No acute intracranial abnormality.    LOCATION:  [facility]   Dictated by (CST): Bryan Dutton MD on 7/20/2024 at 11:29 AM     Finalized by (CST): Bryan Dutton MD on 7/20/2024 at 11:30 AM             [facility]       Medical Decision Making  48-year-old female who is not on aspirin or blood thinner who has asthma, kidney stones, GERD, and headaches presents complaining of dizziness after getting struck in the occipital portion of her head with a ball while at a baseball game last night.  She denies any loss of consciousness.  She denies any nausea or vomiting.  She denies any visual changes.  She did not take any medications for pain.    Pertinent Imaging studies reviewed. (See chart for details).  Patient coming in with head injury.   Differential diagnosis includes but not limited to head injury, intracranial hemorrhage, subdural hematoma, concussion  Radiology CT brain with no acute intracranial abnormality.  Will treat for head injury.  Will discharge on Tylenol and Motrin as needed with ER precautions. Patient is comfortable with this plan.    Overall Pt looks good. Non-toxic, well-hydrated and in no respiratory distress. Vital signs are reassuring. Exam is reassuring. I do not believe pt requires and additional diagnostic studies or intervention. I believe pt can be discharged home to continue evaluation as an outpatient. Follow-up provider given. Discharge instructions given and reviewed. Return for any  problems. All understand and agree with the plan.        Problems Addressed:  Injury of head, initial encounter: acute illness or injury        Disposition and Plan     Clinical Impression:  1. Injury of head, initial encounter         Disposition:  Discharge  7/20/2024  9:46 am    Follow-up:  No follow-up provider specified.        Medications Prescribed:  Discharge Medication List as of 7/20/2024 10:03 AM

## 2024-08-06 ENCOUNTER — OFFICE VISIT (OUTPATIENT)
Dept: FAMILY MEDICINE CLINIC | Facility: CLINIC | Age: 49
End: 2024-08-06
Payer: COMMERCIAL

## 2024-08-06 ENCOUNTER — TELEPHONE (OUTPATIENT)
Dept: INTERNAL MEDICINE CLINIC | Facility: CLINIC | Age: 49
End: 2024-08-06

## 2024-08-06 VITALS
HEART RATE: 76 BPM | TEMPERATURE: 97 F | WEIGHT: 172.81 LBS | SYSTOLIC BLOOD PRESSURE: 110 MMHG | OXYGEN SATURATION: 98 % | BODY MASS INDEX: 28 KG/M2 | DIASTOLIC BLOOD PRESSURE: 70 MMHG | RESPIRATION RATE: 16 BRPM

## 2024-08-06 DIAGNOSIS — E66.3 OVERWEIGHT (BMI 25.0-29.9): Primary | ICD-10-CM

## 2024-08-06 DIAGNOSIS — M25.512 ACUTE PAIN OF LEFT SHOULDER: ICD-10-CM

## 2024-08-06 DIAGNOSIS — M25.511 CHRONIC RIGHT SHOULDER PAIN: ICD-10-CM

## 2024-08-06 DIAGNOSIS — J45.20 MILD INTERMITTENT ASTHMA WITHOUT COMPLICATION (HCC): ICD-10-CM

## 2024-08-06 DIAGNOSIS — G89.29 CHRONIC RIGHT SHOULDER PAIN: ICD-10-CM

## 2024-08-06 PROCEDURE — 3074F SYST BP LT 130 MM HG: CPT | Performed by: NURSE PRACTITIONER

## 2024-08-06 PROCEDURE — 99213 OFFICE O/P EST LOW 20 MIN: CPT | Performed by: NURSE PRACTITIONER

## 2024-08-06 PROCEDURE — 3078F DIAST BP <80 MM HG: CPT | Performed by: NURSE PRACTITIONER

## 2024-08-06 RX ORDER — PREDNISONE 20 MG/1
40 TABLET ORAL DAILY
Qty: 10 TABLET | Refills: 0 | Status: SHIPPED | OUTPATIENT
Start: 2024-08-06

## 2024-08-06 NOTE — PROGRESS NOTES
Chief Complaint   Patient presents with    Referral     For right shoulder pain        HPI:  Presents with approx 1 year history of right shoulder pain, worse with lifting arm or trying to reach behind herself. Was seen for this by me in January and managed with anti-inflammatories and physical therapy. Did have some improvement but now is having worsening of symptoms. Ain is limiting ROM. Also, notes approx 8 month history of left shoulder, much less severe than right shoulder symptoms. Denies injury to shoulders, redness/swelling of shoulders. Denies neck pain, N/T to arms.     Reports difficulty managing weight, even though she continues to work out routinely and eat a healthy diet. Wondering if there are other options, aside from GLP's for weight management.     Has prior diagnosis of asthma. Is not on daily controller medication for this. Feels asthma is well controlled. Denies cough, wheezing, SOB.     Past Medical History:    Abdominal pain    Amenorrhea    IUD    Asthma (HCC)    Back pain    Bloating    Body piercing    Calculus of kidney    Constipation    Dysmenorrhea    Flatulence/gas pain/belching    GERD (gastroesophageal reflux disease)    Headache disorder    Heartburn    Irregular bowel habits    Visual impairment    glasses    Wears glasses       Patient Active Problem List   Diagnosis    Kidney stone    Lumbar radiculopathy    HNP (herniated nucleus pulposus), lumbar    IUD (intrauterine device) in place    Special screening for malignant neoplasm of colon       Current Outpatient Medications   Medication Sig Dispense Refill    predniSONE 20 MG Oral Tab Take 2 tablets (40 mg total) by mouth daily. 10 tablet 0    naproxen 500 MG Oral Tab Take 1 tablet (500 mg total) by mouth 2 (two) times daily with meals. 10 tablet 0    albuterol 108 (90 Base) MCG/ACT Inhalation Aero Soln Inhale 2 puffs into the lungs every 6 (six) hours as needed for Wheezing (cough). 18 g 0    OMEPRAZOLE 20 MG Oral Capsule  Delayed Release TAKE ONE CAPSULE BY MOUTH EVERY DAY (Patient taking differently: Taking as needed) 30 capsule 0       Physical Exam  /70   Pulse 76   Temp 97.1 °F (36.2 °C)   Resp 16   Wt 172 lb 12.8 oz (78.4 kg)   LMP  (LMP Unknown)   SpO2 98%   BMI 27.89 kg/m²   Constitutional: well developed, well nourished, in no apparent distress  HEENT: Normocephalic and atraumatic.   Neck: Normal range of motion. Neck supple, w/o masses, deformity, TTP.  Cardiovascular: Normal rate.   Pulmonary/Chest: No respiratory distress. Effort normal.  MS: bilat shoulders w/o edema, erythema, masses, deformity or TTP. Muscle strength bilat shoulders 5/5. Negative drop test. (+) internal rotation test bilaterally, right worse than left. Pain reproduced with lateral and anterior elevation of both arms to level of shoulder, again right greater than left. Cross body reach normal bilaterally.   Skin: Skin is warm and dry. No rash noted. No erythema. No pallor.     A/P:    Encounter Diagnoses   Name Primary?    Chronic right shoulder pain- check xray. Prednisone burst. Medication administration, use and side effects discussed. Referred back to PT. Also, referred to Dr. Avendaño given chronic nature of complaint. Verbalized understanding of instructions and agreeable to this plan of care.        Acute pain of left shoulder- Prednisone burst. Medication administration, use and side effects discussed. Referred to PT. Also, referred to Dr. Avendaño as above.       Overweight (BMI 25.0-29.9)- referred to weight loss clinic for assist in approaching new weight management techniques.    Yes    Mild intermittent asthma without complication (HCC)- ACT reviewed, score 25. Patient understands plan of care for Asthma control.           No orders of the defined types were placed in this encounter.      Meds & Refills for this Visit:  Requested Prescriptions     Signed Prescriptions Disp Refills    predniSONE 20 MG Oral Tab 10 tablet 0     Sig: Take  2 tablets (40 mg total) by mouth daily.       Imaging & Consults:  OP REFERRAL TO [facility]  ORTHOPEDIC - INTERNAL  OP REFERRAL TO WEIGHT LOSS CLINIC  XR SHOULDER, COMPLETE (MIN 2 VIEWS), RIGHT (CPT=73030)    No follow-ups on file.  Patient Instructions         Take prednisone, 2 tabs, at the same time, daily for 5 days. Take early in the morning.   Do not take any Motrin, Advil, ibuprofen products or Aleve while taking this medication.    It is ok to take Tylenol (acetaminophen) while taking this medication. Follow  instructions for dosing and frequency schedule.       All questions were answered and the patient understands the plan.

## 2024-08-06 NOTE — TELEPHONE ENCOUNTER
Patient left VM on [facility] RN Line asking to schedule new patient appointment with Jeannie MENDEZ [facility] Front-Desk- can you please contact patient to schedule new patient appointment with ANGELLA?  TY!

## 2024-08-22 ENCOUNTER — TELEPHONE (OUTPATIENT)
Dept: ORTHOPEDICS CLINIC | Facility: CLINIC | Age: 49
End: 2024-08-22

## 2024-08-22 DIAGNOSIS — M25.512 BILATERAL SHOULDER PAIN, UNSPECIFIED CHRONICITY: Primary | ICD-10-CM

## 2024-08-22 DIAGNOSIS — M25.511 BILATERAL SHOULDER PAIN, UNSPECIFIED CHRONICITY: Primary | ICD-10-CM

## 2024-08-22 NOTE — TELEPHONE ENCOUNTER
X-Ray ordered and scheduled with already scheduled appointment. Patient called and notified.    Future Appointments   Date Time Provider Department Center   8/26/2024  3:50 PM [facility] XR RM1 [facility] [place]   8/26/2024  4:05 PM [facility] XR RM2 [facility] [place]   9/25/2024  7:00 AM [id number]

## 2024-08-22 NOTE — TELEPHONE ENCOUNTER
Patient scheduled with Dr. Avendaño for seth shoulder. Patient getting right shoulder xray completed next Monday. No xray of left shoulder in epic. Patient was advised to arrive early for the xray.

## 2024-08-26 ENCOUNTER — HOSPITAL ENCOUNTER (OUTPATIENT)
Dept: GENERAL RADIOLOGY | Age: 49
Discharge: HOME | End: 2024-08-26
Attending: ORTHOPAEDIC SURGERY
Payer: COMMERCIAL

## 2024-08-26 ENCOUNTER — HOSPITAL ENCOUNTER (OUTPATIENT)
Dept: GENERAL RADIOLOGY | Age: 49
Discharge: HOME | End: 2024-08-26
Attending: NURSE PRACTITIONER
Payer: COMMERCIAL

## 2024-08-26 DIAGNOSIS — M25.512 BILATERAL SHOULDER PAIN, UNSPECIFIED CHRONICITY: ICD-10-CM

## 2024-08-26 DIAGNOSIS — G89.29 CHRONIC RIGHT SHOULDER PAIN: ICD-10-CM

## 2024-08-26 DIAGNOSIS — M25.511 CHRONIC RIGHT SHOULDER PAIN: ICD-10-CM

## 2024-08-26 DIAGNOSIS — M25.511 BILATERAL SHOULDER PAIN, UNSPECIFIED CHRONICITY: ICD-10-CM

## 2024-08-26 PROCEDURE — 73030 X-RAY EXAM OF SHOULDER: CPT | Performed by: ORTHOPAEDIC SURGERY

## 2024-08-26 PROCEDURE — 73030 X-RAY EXAM OF SHOULDER: CPT | Performed by: NURSE PRACTITIONER

## 2024-09-25 ENCOUNTER — OFFICE VISIT (OUTPATIENT)
Dept: ORTHOPEDICS CLINIC | Facility: CLINIC | Age: 49
End: 2024-09-25
Payer: COMMERCIAL

## 2024-09-25 VITALS — HEIGHT: 66 IN | WEIGHT: 175 LBS | BODY MASS INDEX: 28.13 KG/M2

## 2024-09-25 DIAGNOSIS — S46.001A INJURY OF RIGHT ROTATOR CUFF, INITIAL ENCOUNTER: Primary | ICD-10-CM

## 2024-09-25 PROCEDURE — 3008F BODY MASS INDEX DOCD: CPT | Performed by: ORTHOPAEDIC SURGERY

## 2024-09-25 PROCEDURE — 99204 OFFICE O/P NEW MOD 45 MIN: CPT | Performed by: ORTHOPAEDIC SURGERY

## 2024-09-25 NOTE — H&P
Orthopaedic Surgery  [address] [place] [id number]  [phone]     NEW PATIENT VISIT - HISTORY AND PHYSICAL EXAMINATION     Name: Coleman Gastelum   MRN: [id number]  Date: 2024     CC: Bilateral shoulder pain    REFERRED BY: Violetta Rosario DO    HPI:   Coleman Gastelum is a very pleasant 48 year old left-hand dominant female who presents today for evaluation of bilateral shoulder pain ongoing since 2024. Initially, pain began on the right side and radiated to the left. Pain is 6/10 with stiffness and weakness. She has to modify her activities and has difficulty sleeping. She reports a limited ROM. Physical therapy at [facility] has provided moderate relief.     She enjoys walking, working out and relaxing. 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 Works as a director.    PMH:   Past Medical History:    Abdominal pain    Amenorrhea    IUD    Asthma (HCC)    Back pain    Bloating    Body piercing    Calculus of kidney    Constipation    Dysmenorrhea    Flatulence/gas pain/belching    GERD (gastroesophageal reflux disease)    Headache disorder    Heartburn    Irregular bowel habits    Visual impairment    glasses    Wears glasses       PAST SURGICAL HX:  Past Surgical History:   Procedure Laterality Date    Colonoscopy      Pain management Left 2021    LEFT LUMBAR 5 TRANSFORAMINAL EPIDURAL STEROID INJECTION SINGLE LEVEL       FAMILY HX:  Family History   Problem Relation Age of Onset    Hypertension Mother         On high blood pressure medication    Hypertension Father         On high blood pressure medication    Breast Cancer Maternal Aunt         She  in the        ALLERGIES:  Patient has no known allergies.    MEDICATIONS:   Current Outpatient Medications   Medication Sig Dispense Refill    predniSONE 20 MG Oral Tab Take 2 tablets (40 mg total) by mouth daily. 10 tablet 0    albuterol 108 (90 Base) MCG/ACT Inhalation Aero Soln Inhale 2 puffs into the lungs every 6 (six) hours  as needed for Wheezing (cough). 18 g 0    OMEPRAZOLE 20 MG Oral Capsule Delayed Release TAKE ONE CAPSULE BY MOUTH EVERY DAY (Patient taking differently: Taking as needed) 30 capsule 0       ROS: A comprehensive 14 point review of systems was performed and was negative aside from the aforementioned per history of present illness.    SOCIAL HX:  Social History     Tobacco Use    Smoking status: Never    Smokeless tobacco: Never   Substance Use Topics    Alcohol use: Not Currently     Alcohol/week: 1.0 standard drink of alcohol     Types: 1 Glasses of wine per week     Comment: I rarely drink, maybe 1-2 a month a glass of wine       PE:   Vitals:    09/25/24 0702   Weight: 175 lb   Height: 5' 6\" (1.676 m)     Estimated body mass index is 28.25 kg/m² as calculated from the following:    Height as of this encounter: 5' 6\" (1.676 m).    Weight as of this encounter: 175 lb.    Physical Exam  Constitutional:       Appearance: Normal appearance.   HENT:      Head: Normocephalic and atraumatic.   Eyes:      Extraocular Movements: Extraocular movements intact.   Neck:      Musculoskeletal: Normal range of motion and neck supple.   Cardiovascular:      Pulses: Normal pulses.   Pulmonary:      Effort: Pulmonary effort is normal. No respiratory distress.   Abdominal:      General: There is no distension.   Skin:     General: Skin is warm.      Capillary Refill: Capillary refill takes less than 2 seconds.      Findings: No bruising.   Neurological:      General: No focal deficit present.      Mental Status: Alert.   Psychiatric:         Mood and Affect: Mood normal.     Examination of the right shoulder demonstrates:   Skin is intact, warm and dry.   Cervical:  Full ROM  Spurling's  Negative    Deformity:   none  Atrophy:   none    Scapular winging: Negative    Palpation:     AC Joint   Negative  Biceps Tendon  Negative  Greater Tuberosity Negative    ROM:   Forward Flexion:  120° - assisted to 150  Abduction:   90°  External  Rotation:  60°  Internal Rotation:  thoracolumbar junction    Rotator Cuff Strength:   Supraspinatus:   3/5  Subscapularis:   4/5  Infraspinatus/Teres: 5/5    Provocative Tests:   Velázquez:   Positive  Speed's:   Positive  Canton's:   Positive    Neurovascular Upper Extremity (Bilateral)  Motor:    5/5 EPL, Finger Abduction, , Pinch, Deltoid  Sensation:   intact to light touch median, ulnar, radial and axillary nerve  Circulation:   Normal, 2+ radial pulse    The contralateral upper extremity is without limitation in range of motion or strength, no positive provocative maneuvers.       Radiographic Examination/Diagnostics:    Shoulder XR personally viewed, independently interpreted and radiology report was reviewed.    XR SHOULDER, COMPLETE (MIN 2 VIEWS), RIGHT (CPT=73030)    Result Date: 8/26/2024  PROCEDURE:  XR SHOULDER, COMPLETE (MIN 2 VIEWS), RIGHT (CPT=73030)  TECHNIQUE:  Multiple views were obtained.  COMPARISON:  None.  INDICATIONS:  G89.29 Chronic right shoulder pain M25.511 Chronic right shoulder pain  PATIENT STATED HISTORY: (As transcribed by Technologist)  Patient is having ortho evaluation. She has bilateral shoulder pain for a few months, no known injury, worse in the right shoulder.    FINDINGS:  No acute fractures or osseous lesions are identified.  Glenohumeral relationship is within normal limits.  The visualized portion of the lungs are clear.  Osteoarthritic changes greatest in the acromioclavicular joint with osteophyte formation.  There is spurring of the inferior aspect of the humeral head.             CONCLUSION:  No acute fractures.  Mild osteoarthritic changes.   LOCATION:  [facility]   Dictated by (CST): Jordi Madera MD on 8/26/2024 at 4:14 PM     Finalized by (CST): Jordi Madera MD on 8/26/2024 at 4:15 PM       XR SHOULDER, COMPLETE (MIN 2 VIEWS), LEFT (CPT=73030)    Result Date: 8/26/2024  PROCEDURE:  XR SHOULDER, COMPLETE (MIN 2 VIEWS), LEFT (CPT=73030)  TECHNIQUE:   Multiple views were obtained.  COMPARISON:  None.  INDICATIONS:  M25.512 Bilateral shoulder pain, unspecified chronicity M25.511 Bilateral shoulder pain, unspecified chronicity  PATIENT STATED HISTORY: (As transcribed by Technologist)  Patient is having ortho evaluation. She has bilateral shoulder pain for a few months, no known injury, worse in the right shoulder.    FINDINGS:  No acute fractures or osseous lesions are identified.  Glenohumeral relationship is within normal limits.  The visualized portion of the lungs are clear.  Osteoarthritic changes at the acromioclavicular joint with osteophyte formation.             CONCLUSION:  No acute findings.  Osteoarthritic changes within the acromioclavicular joint.   LOCATION:  [facility]   Dictated by (CST): Jordi Madera MD on 8/26/2024 at 4:12 PM     Finalized by (CST): Jordi Madera MD on 8/26/2024 at 4:14 PM         IMPRESSION: Coleman Gastelum is a 48 year old Left hand dominant female with suspected right rotator cuff injury sustained February 2024. Physical therapy has provided moderate relief.    In light of physical findings, we elected to order an MRI to further characterize internal derangement.     PLAN:   We had a detailed discussion outlining the etiology, anatomy, pathophysiology, and natural history of patient's findings. Imaging was reviewed in detail and correlated to a 3-dimensional model of the shoulder.     In light of the chronicity of symptoms, loss of normal function, and  failure to progress conservatively we recommend an MRI to evaluate the integrity of the patient's findings. The patient will follow up after imaging.   Differential diagnosis includes but not limited to: rotator cuff/labral pathology, impingement, tendinopathy, cartilage injury/loose body, bone marrow edema, and osteoarthritis.     External records were also reviewed for pertinent historical findings contributing to the patients undiagnosed new problem with  uncertain prognosis.     The patient had the opportunity to ask questions, and all questions were answered appropriately.      FOLLOW-UP:   Return to clinic after MRI completion.       Charlee Avendaño MD  Knee, Shoulder, & Elbow Surgery / Sports Medicine Specialist  Orthopaedic Surgery  [address] [place] [address] [id number] [place] [address].[email or web address]  t: [phone]  o: [phone]  f: [phone]    This note was dictated using Dragon software.  While it was briefly proofread prior to completion, some grammatical, spelling, and word choice errors due to dictation may still occur.

## 2024-09-26 ENCOUNTER — HOSPITAL ENCOUNTER (OUTPATIENT)
Dept: MRI IMAGING | Age: 49
Discharge: HOME | End: 2024-09-26
Attending: ORTHOPAEDIC SURGERY
Payer: COMMERCIAL

## 2024-09-26 DIAGNOSIS — S46.001A INJURY OF RIGHT ROTATOR CUFF, INITIAL ENCOUNTER: ICD-10-CM

## 2024-09-26 PROCEDURE — 73221 MRI JOINT UPR EXTREM W/O DYE: CPT | Performed by: ORTHOPAEDIC SURGERY

## 2024-10-16 ENCOUNTER — OFFICE VISIT (OUTPATIENT)
Dept: ORTHOPEDICS CLINIC | Facility: CLINIC | Age: 49
End: 2024-10-16
Payer: COMMERCIAL

## 2024-10-16 DIAGNOSIS — M75.21 BICEPS TENDINITIS OF RIGHT UPPER EXTREMITY: ICD-10-CM

## 2024-10-16 DIAGNOSIS — M75.81 TENDINITIS OF RIGHT ROTATOR CUFF: Primary | ICD-10-CM

## 2024-10-16 DIAGNOSIS — M75.41 SUBACROMIAL IMPINGEMENT OF RIGHT SHOULDER: ICD-10-CM

## 2024-10-16 PROCEDURE — 20610 DRAIN/INJ JOINT/BURSA W/O US: CPT | Performed by: ORTHOPAEDIC SURGERY

## 2024-10-16 PROCEDURE — 99214 OFFICE O/P EST MOD 30 MIN: CPT | Performed by: ORTHOPAEDIC SURGERY

## 2024-10-16 RX ORDER — KETOROLAC TROMETHAMINE 30 MG/ML
30 INJECTION, SOLUTION INTRAMUSCULAR; INTRAVENOUS ONCE
Status: COMPLETED | OUTPATIENT
Start: 2024-10-16 | End: 2024-10-17

## 2024-10-16 RX ORDER — TRIAMCINOLONE ACETONIDE 40 MG/ML
40 INJECTION, SUSPENSION INTRA-ARTICULAR; INTRAMUSCULAR ONCE
Status: COMPLETED | OUTPATIENT
Start: 2024-10-16 | End: 2024-10-17

## 2024-10-16 NOTE — PROCEDURES
Right Shoulder Glenohumeral Joint Injection    Name: Coleman Gastelum   MRN: [id number]  Date: 10/16/2024     Clinical Indications:   Persistent Shoulder pain refractory to conservative measures.  Rotator cuff tendinitis.     After informed consent, the injection site was marked, sterilized with topical chlorhexidine antiseptic, and locally anesthetized with skin refrigerant.    The patient was seated upright and the shoulder was exposed. Using sterile technique: 1 mL of 30mg/mL of Ketorolac, 2 mL of 0.5% Bupivicaine, 2 mL of 1% Lidocaine, and 1 mg of 40mg/mL of Triamcinolone (Kenalog) was injected with a Anterior approach utilizing a 22 gauge needle.  A band-aid was applied.  The patient tolerated the procedure well.        Charlee Avendaño MD  Knee, Shoulder, & Elbow Surgery / Sports Medicine Specialist  Orthopaedic Surgery  [address] [place] [address].org  [email or web address]  t: [phone]  o: [phone]  f: [phone]

## 2024-10-16 NOTE — PROGRESS NOTES
Orthopaedic Surgery  [address] [place] [id number]  [phone]       Name: Coleman Gastelum   MRN: [id number]  Date: 10/16/2024     REASON FOR VISIT: MRI review of right shoulder pain     INTERVAL HISTORY:  Coleman Gastelum is a 48 year old left-hand dominant female who presents today for MRI review of the right shoulder.    To summarize: bilateral shoulder pain ongoing since February 2024. Initially, pain began on the right side and radiated to the left. Pain is associated with stiffness and weakness. She has to modify her activities and has difficulty sleeping. She reports a limited ROM. One year of physical therapy at [facility] has provided moderate relief.     Current pain is 6/10.      She enjoys walking, working out and relaxing. 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 Works as a director.      PE:   There were no vitals filed for this visit.  Estimated body mass index is 28.25 kg/m² as calculated from the following:    Height as of 9/25/24: 5' 6\" (1.676 m).    Weight as of 9/25/24: 175 lb.    Physical Exam  Constitutional:       Appearance: Normal appearance.   HENT:      Head: Normocephalic and atraumatic.   Eyes:      Extraocular Movements: Extraocular movements intact.   Neck:      Musculoskeletal: Normal range of motion and neck supple.   Cardiovascular:      Pulses: Normal pulses.   Pulmonary:      Effort: Pulmonary effort is normal. No respiratory distress.   Abdominal:      General: There is no distension.   Skin:     General: Skin is warm.      Capillary Refill: Capillary refill takes less than 2 seconds.      Findings: No bruising.   Neurological:      General: No focal deficit present.      Mental Status: She is alert.   Psychiatric:         Mood and Affect: Mood normal.     Examination of the right shoulder demonstrates:     Skin is intact, warm and dry.   Cervical:  Full ROM  Spurling's                           Negative     Deformity:                         none  Atrophy:                              none    Scapular winging: Negative     Palpation:                            AC Joint                                             Negative  Biceps Tendon                  Negative  Greater Tuberosity          Negative     ROM:   Forward Flexion:              120° - assisted to 150  Abduction:                         90°  External Rotation:           60°  Internal Rotation:            thoracolumbar junction     Rotator Cuff Strength:   Supraspinatus:                 3/5  Subscapularis:                  4/5  Infraspinatus/Teres:        5/5     Provocative Tests:   Velázquez:                            Positive  Speed's:                             Positive  Real's:                           Positive     Neurovascular Upper Extremity (Bilateral)  Motor:                                5/5 EPL, Finger Abduction, , Pinch, Deltoid  Sensation:                          intact to light touch median, ulnar, radial and axillary nerve  Circulation:                        Normal, 2+ radial pulse     The contralateral upper extremity is without limitation in range of motion or strength, no positive provocative maneuvers.     Radiographic Examination/Diagnostics:    Shoulder MRI personally viewed, independently interpreted and radiology report was reviewed.    MRI SHOULDER, RIGHT (CPT=73221)    Result Date: 9/26/2024  PROCEDURE:  MRI SHOULDER, RIGHT (CPT=73221)  COMPARISON:  [facility], XR, XR SHOULDER, COMPLETE (MIN 2 VIEWS), RIGHT (CPT=73030), 8/26/2024, 3:54 PM.  INDICATIONS:  Right shoulder pain and limited range of motion.  TECHNIQUE:  Multiplanar imaging of the shoulder including oblique coronal, axial and sagittal imaging was acquired including proton density fat suppression technique. Images were performed without intravenous gadolinium.  PATIENT STATED HISTORY: (As transcribed by Technologist)  Patient has throbbing pain in the right shoulder with limited range of motion.    FINDINGS:  ROTATOR CUFF:   There is moderate rotator cuff tendinopathy involving the supraspinatus and infraspinatus tendons diffusely with evidence of low-grade partial thickness intrasubstance tearing of the supraspinatus tendon with mild intrasubstance delamination into the myotendinous junction.  No high-grade or full-thickness tear.  There is rotator cuff enthesopathy at the greater tuberosity insertion of the infraspinatus tendon with subcortical cystic changes noted. MUSCULATURE:  No acute muscular strains, edema or atrophy. AC JOINT/ACROMION:  There is mild AC joint arthropathy.  The acromion is type 2.  There is mild subacromial enthesopathy and narrowing of the subacromial arch consistent with extrinsic rotator cuff impingement.  There is associated mild subacromial/subdeltoid bursitis. BICEPS/LABRAL COMPLEX:  Long head of the biceps tendon is intact with mild intra-articular biceps tendinosis.  The biceps labral anchor reveals mild increased T2 signal suggesting a subtle type 2 slap lesion.  The remainder of the glenoid labrum is unremarkable.  The glenohumeral ligaments are unremarkable. GLENOHUMERAL JOINT:  No significant arthritis or acute injury.  Normal marrow and cortical signal.  Unremarkable capsular insertions.  No effusion.             CONCLUSION:    1. Moderate rotator cuff tendinopathy involving the supraspinatus and infraspinatus tendons diffusely, with associated low-grade partial thickness intrasubstance tear with mild intrasubstance delamination extending into the myotendinous junction of supraspinatus.  Associated rotator cuff enthesopathy.   2. Type 2 acromion with subacromial enthesopathy.  Secondary narrowing of the subacromial arch and subacromial/subdeltoid bursitis, consistent with extrinsic rotator cuff impingement.   3. Mild intra-articular biceps tendinosis, with some slight increased T2 signal extending into the biceps labral anchor suggesting a type 2 slap lesion.     LOCATION:  [facility]   Dictated by  (CST): Riri Garza DO on 9/26/2024 at 3:54 PM     Finalized by (CST): Riri Garza DO on 9/26/2024 at 4:02 PM         IMPRESSION: Coleman Gastelum is a 48 year old with right rotator cuff tendinitis, subacromial impingement and biceps tendinitis sustained February 2024. Physical therapy has provided moderate relief.     We elected to maximize conservative management with intra-articular corticosteroid and ketorolac injection coupled with physical therapy.     PLAN:   We reviewed the treatment of this disease condition.  Fortunately, treatment is amenable to conservative treatment which we chose to optimize at today's visit.  After a discussion of a variety of conservative treatment options, I recommended intra-articular injection with corticosteroid and ketorolac coupled with physical therapy to aid in strengthening, range of motion, functional improvement, and return to baseline activity.       We elected to proceed with the injection procedure at today's visit. We discussed the risk and benefits of the procedure; including, but not limited to: infection, injury to blood vessels, nerve injury, prolonged pain, swelling, site soreness, failure to progress, and need for advanced treatments.  The patient voiced understanding and agreed to proceed with the treatment plan.      I spent 30 minutes in preparation to see the patient, counseling/education of relevant pathology, discussing imaging results, ordering therapy  intervention, care coordination, and documentation into the electronic medical record.      FOLLOW-UP:   Return to clinic on an as needed basis.       Charlee Avendaño MD  Knee, Shoulder, & Elbow Surgery / Sports Medicine Specialist  Orthopaedic Surgery  [address] [place] [address] [id number] [place] [address].[email or web address]  [email or web address]  t: [phone]  o: [phone]  f: [phone]    This note was dictated using Dragon software.  While it was briefly proofread prior to completion, some  grammatical, spelling, and word choice errors due to dictation may still occur.

## 2024-10-17 ENCOUNTER — TELEPHONE (OUTPATIENT)
Dept: PHYSICAL THERAPY | Facility: HOSPITAL | Age: 49
End: 2024-10-17

## 2024-10-17 RX ADMIN — KETOROLAC TROMETHAMINE 30 MG: 30 INJECTION, SOLUTION INTRAMUSCULAR; INTRAVENOUS at 07:49:00

## 2024-10-17 RX ADMIN — TRIAMCINOLONE ACETONIDE 40 MG: 40 INJECTION, SUSPENSION INTRA-ARTICULAR; INTRAMUSCULAR at 07:49:00

## 2024-10-22 ENCOUNTER — PATIENT MESSAGE (OUTPATIENT)
Dept: PHYSICAL THERAPY | Facility: HOSPITAL | Age: 49
End: 2024-10-22

## 2024-11-04 ENCOUNTER — OFFICE VISIT (OUTPATIENT)
Dept: PHYSICAL THERAPY | Facility: HOSPITAL | Age: 49
End: 2024-11-04
Attending: ORTHOPAEDIC SURGERY
Payer: COMMERCIAL

## 2024-11-04 DIAGNOSIS — M75.21 BICEPS TENDINITIS OF RIGHT UPPER EXTREMITY: ICD-10-CM

## 2024-11-04 DIAGNOSIS — M75.41 SUBACROMIAL IMPINGEMENT OF RIGHT SHOULDER: ICD-10-CM

## 2024-11-04 DIAGNOSIS — M75.81 TENDINITIS OF RIGHT ROTATOR CUFF: Primary | ICD-10-CM

## 2024-11-04 PROCEDURE — 97110 THERAPEUTIC EXERCISES: CPT

## 2024-11-04 PROCEDURE — 97161 PT EVAL LOW COMPLEX 20 MIN: CPT

## 2024-11-04 NOTE — PROGRESS NOTES
SHOULDER EVALUATION:     Diagnosis:    Tendinitis of right rotator cuff (M75.81)  Subacromial impingement of right shoulder (M75.41)  Biceps tendinitis of right upper extremity (M75.21)         Referring Provider: Charlee Avendaño  Date of Evaluation:    11/4/2024    Precautions:  None Next MD visit:   none scheduled  Date of Surgery: n/a     PATIENT SUMMARY   Coleman Gastelum is a 48 year old female who presents to therapy today with complaints of R shoulder that started in April this year-NKI. Had steroid shot R shoulder which lessened pain quite a bit but having L shoulder pain now , which feels the same as the R shoulder did prior to steroid shot. Had gone to PT in the past-had made some progress. Has stopped doing HEP.  Pt describes pain level current 5/10, at best 1/10, at worst 8/10.  Current functional limitations include reaching behind back, lifting, pulling up pants, carrying groceries, doing recreational activities  Work: sitting, desk. Works out every day-squats, lunges, planks,  shred, HIIT, weights-notices some of the exercises hurt her shoulder when she works out  Coleman describes prior level of function non regarding shoulder 1 year ago. Pt goals include get rid of pain.  Past medical history was reviewed with [facility]. Significant findings include  has a past medical history of Abdominal pain, Amenorrhea, Asthma (HCC), Back pain, Bloating, Body piercing, Calculus of kidney, Constipation, Dysmenorrhea, Flatulence/gas pain/belching, GERD (gastroesophageal reflux disease), Headache disorder, Heartburn (09/01/2016), Irregular bowel habits, Visual impairment, and Wears glasses.     ASSESSMENT  Coleman presents to physical therapy evaluation with primary c/o B shoulders. The results of the objective tests and measures show decreased range of motion , strength, fair posture, mm tightness.  Functional deficits include but are not limited to lifting, reaching.  Signs and symptoms are consistent  with diagnosis of Tendinitis of right rotator cuff (M75.81)Subacromial impingement of right shoulder (M75.41)Biceps tendinitis of right upper extremity (M75.21). Pt and PT discussed evaluation findings, pathology, POC and HEP.  Pt voiced understanding and performs HEP correctly without reported pain. Skilled Physical Therapy is medically necessary to address the above impairments and reach functional goals.     OBJECTIVE:   Observation/Posture: mm tightness visible upper trap, cervical upper trap and levator scapular, L scapula lateral winging  Palpation:  mm tightness B upper trap, mid trap, rhomboid, cervical paraspinals, tenderness R>L supraspinatus mm/tendon     Sensation: SILT  Cervical Screen: ROM wnl- mm tightness throughout the day    AROM: (* denotes performed with pain)  Shoulder  Elbow-WNL B throughout   Flexion: R 140; L 140  Abduction: R 140*; L 150  ER: R 65*; L 65*  IR: R T10*; L T8*      Accessory motion: mm guarding R GH, decreased PA B    Flexibility: muscle guarding R shoulder    Subacromial Pain Syndrome:  Empty Can test: R pain, L neg  Velázquez-Willian Impingement Sign: R some pain, L neg  Palpable Tenderness Infraspinatus and Supraspinatus: R +, L -  Rotator Cuff Tears:   ER Lag Sign: R neg L neg  Dropping Sign at 90 Deg ABD and 45 deg ER: R pain, L neg  IR Lag Sign: R pain, L pain    Strength/MMT: (* denotes performed with pain)  Shoulder Elbow Scapular   Flexion: R 4-*/5; L 4/5  Abduction: R 4-*/5; L 4/5  ER: R 5-/5; L 5/5  IR: R 5/5; L 5/5 Flexion: R 5/5; L 5/5  Extension: R 5/5; L 5/5  Supination: R 5/5; L 5/5  Pronation: R 5/5; L 5/5  Rhomboids: R 4-/5,* L 4/5  Mid trap: R 4-/5*; L 4/5   Lats: R 4-/5*, L 4/5  Low trap: R 4-/5*; L 4/5     Today’s Treatment and Response:   Pt education was provided on exam findings, treatment diagnosis, treatment plan, expectations, and prognosis. Pt was also provided recommendations for activity modifications-specifically reduction of symptom aggravating  workout activities, modalities as needed [ice/heat], postural corrections, and ergonomics. Instructed pt to have co-worker take pic of pt at work  Patient was instructed in and issued a HEP for:   Exercises  - Sleeper Stretch  - 2 x daily - 7 x weekly - 4 sets - 25 hold  - Doorway Pec Stretch at 60 Degrees Abduction with Arm Straight  - 2 x daily - 7 x weekly - 2 sets - 5 reps - 10 hold  - Standing Shoulder Posterior Capsule Stretch  - 2 x daily - 7 x weekly - 3 sets - 15 hold  - Standing Shoulder Flexion to 90 Degrees  - 2 x daily - 7 x weekly - 2 sets - 10 reps  - Shoulder Abduction - Thumbs Up  - 2 x daily - 7 x weekly - 2 sets - 10 reps  - Standing Shoulder Row with Anchored Resistance  - 2 x daily - 7 x weekly - 2 sets - 8 reps  - Shoulder extension with resistance - Neutral  - 2 x daily - 7 x weekly - 2 sets - 10 reps  - Shoulder Internal Rotation with Resistance  - 2 x daily - 7 x weekly - 2 sets - 10 reps  - Shoulder External Rotation with Anchored Resistance  - 2 x daily - 7 x weekly - 2 sets - 10 reps  - Supine Shoulder Flexion Extension AAROM with Dowel  - 2 x daily - 7 x weekly - 2 sets - 10 reps - 3 hold  - Standing Shoulder Abduction Wall Slide with Thumb Out  - 2 x daily - 7 x weekly - 2 sets - 10 reps  - Seated Gentle Upper Trapezius Stretch  - 2 x daily - 7 x weekly - 3 sets - 30 hold  - Gentle Levator Scapulae Stretch  - 2 x daily - 7 x weekly - 3 sets - 30 hold    MT:B upper/middle trap, rhomboid, cervical paraspinals       Charges: PT Eval Low Complexity, TE (included MT)     Total Timed Treatment: 20 min     Total Treatment Time: 45 min     PLAN OF CARE:    Goals: (to be met in 10 visits)   Pt will report improved ability to sleep without waking due to shoulder pain   Pt will improve shoulder flexion AROM to >/=160 degrees to be able to reach into overhead cabinets without pain or restriction   Pt will improve shoulder abduction AROM to >170 degrees to improve ability to don deodorant, don/doff  shirts, and wash hair   Pt will increase shoulder AROM ER to 85 to be able to reach and fasten seatbelt   Pt will increase shoulder AROM IR to 70 to be able to reach in back pocket, tuck in shirt, and turn steering wheel without pain  Pt will improve shoulder strength throughout to 5-/5 to improve function with lifting and reaching   Pt will demonstrate increased mid/low trap strength to 5-/5 to promote improved shoulder mechanics and stabilization with lifting and reaching   Pt will be independent and compliant with comprehensive HEP to maintain progress achieved in PT     Frequency / Duration: Patient will be seen for 2 x/week or a total of 10 visits over a 90 day period. Treatment will include: Manual Therapy, Neuromuscular Re-education, Self-Care Home Management, Therapeutic Activities, Therapeutic Exercise, Home Exercise Program instruction, and Modalities to include: CP    Education or treatment limitation: None  Rehab Potential:good    QuickDASH Outcome Score  Score: (Patient-Rptd) 29.55 % (11/4/2024  2:14 PM)      Patient/Family/Caregiver was advised of these findings, precautions, and treatment options and has agreed to actively participate in planning and for this course of care.    Thank you for your referral. Please co-sign or sign and return this letter via fax as soon as possible to [phone]. If you have any questions, please contact me at Dept: [phone]    Sincerely,  Electronically signed by therapist: Savi Stoll, PT  Physician's certification required: Yes  I certify the need for these services furnished under this plan of treatment and while under my care.    X___________________________________________________ Date____________________    Certification From: 11/4/2024  To:2/2/2025

## 2024-11-27 ENCOUNTER — HOSPITAL ENCOUNTER (OUTPATIENT)
Dept: ULTRASOUND IMAGING | Age: 49
Discharge: HOME | End: 2024-11-27
Attending: NURSE PRACTITIONER
Payer: COMMERCIAL

## 2024-11-27 DIAGNOSIS — K76.89 LIVER CYST: ICD-10-CM

## 2024-11-27 PROCEDURE — 76700 US EXAM ABDOM COMPLETE: CPT | Performed by: NURSE PRACTITIONER

## 2024-12-16 ENCOUNTER — OFFICE VISIT (OUTPATIENT)
Dept: ORTHOPEDICS CLINIC | Facility: CLINIC | Age: 49
End: 2024-12-16
Payer: COMMERCIAL

## 2024-12-16 DIAGNOSIS — M75.42 SUBACROMIAL IMPINGEMENT OF LEFT SHOULDER: Primary | ICD-10-CM

## 2024-12-16 PROCEDURE — 99214 OFFICE O/P EST MOD 30 MIN: CPT | Performed by: ORTHOPAEDIC SURGERY

## 2024-12-16 PROCEDURE — 20610 DRAIN/INJ JOINT/BURSA W/O US: CPT | Performed by: ORTHOPAEDIC SURGERY

## 2024-12-16 RX ORDER — KETOROLAC TROMETHAMINE 30 MG/ML
30 INJECTION, SOLUTION INTRAMUSCULAR; INTRAVENOUS ONCE
Status: COMPLETED | OUTPATIENT
Start: 2024-12-16 | End: 2024-12-16

## 2024-12-16 RX ORDER — TRIAMCINOLONE ACETONIDE 40 MG/ML
40 INJECTION, SUSPENSION INTRA-ARTICULAR; INTRAMUSCULAR ONCE
Status: COMPLETED | OUTPATIENT
Start: 2024-12-16 | End: 2024-12-16

## 2024-12-16 RX ADMIN — KETOROLAC TROMETHAMINE 30 MG: 30 INJECTION, SOLUTION INTRAMUSCULAR; INTRAVENOUS at 13:05:00

## 2024-12-16 RX ADMIN — TRIAMCINOLONE ACETONIDE 40 MG: 40 INJECTION, SUSPENSION INTRA-ARTICULAR; INTRAMUSCULAR at 13:05:00

## 2024-12-16 NOTE — PROGRESS NOTES
Orthopaedic Surgery  [address] [place] [id number]  [phone]       Name: Coleman Gastelum   MRN: [id number]  Date: 12/16/2024     REASON FOR VISIT:   Follow up of right shoulder rotator cuff tendinitis, subacromial impingement and biceps tendinitis.   Left shoulder pain    INTERVAL HISTORY:  Coleman Gastelum is a 49 year old left-hand dominant female who presents today for repeat evaluation of right rotator cuff tendinitis, subacromial impingement and biceps tendinitis.    To summarize: bilateral shoulder pain ongoing since February 2024. Initially, pain began on the right side and radiated to the left. Pain is associated with stiffness and weakness. She has to modify her activities and has difficulty sleeping. She reports a limited ROM. One year of physical therapy at [facility] has provided moderate relief.     We have been managing conservatively with physical therapy and a steroid injection on 10/16/24. This provided notable improvement. Since then, she reports new pain in the left shoulder pain. This is rated up to 4/10.      She enjoys walking, working out and relaxing. 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 Works as a director.      PE:   There were no vitals filed for this visit.  Estimated body mass index is 28.25 kg/m² as calculated from the following:    Height as of 9/25/24: 5' 6\" (1.676 m).    Weight as of 9/25/24: 175 lb.    Physical Exam  Constitutional:       Appearance: Normal appearance.   HENT:      Head: Normocephalic and atraumatic.   Eyes:      Extraocular Movements: Extraocular movements intact.   Neck:      Musculoskeletal: Normal range of motion and neck supple.   Cardiovascular:      Pulses: Normal pulses.   Pulmonary:      Effort: Pulmonary effort is normal. No respiratory distress.   Abdominal:      General: There is no distension.   Skin:     General: Skin is warm.      Capillary Refill: Capillary refill takes less than 2 seconds.      Findings: No bruising.   Neurological:       General: No focal deficit present.      Mental Status: She is alert.   Psychiatric:         Mood and Affect: Mood normal.     Examination of the shoulders demonstrates:     Skin is intact, warm and dry.   Cervical:  Full ROM  Spurling's  Negative    Deformity:   none  Atrophy:   none    Scapular winging: Negative    Palpation:     AC Joint   Negative  Biceps Tendon  Negative  Greater Tuberosity Negative    ROM:   Forward Flexion:  Right- slight asymmetry compared to the contralateral side  Abduction:   full and symmetric  External Rotation:  full and symmetric  Internal Rotation:  full and symmetric    Rotator Cuff Strength:   Supraspinatus:   4+/5 (right)  Subscapularis:   5/5  Infraspinatus/Teres: 5/5    Provocative Tests:   Velázquez:   Positive (left)  Speed's:   Negative  Scotia's:   Negative  Lift-off:    Negative  Apprehension:  Negative  Sulcus Sign:   Negative    Neurovascular Upper Extremity (Bilateral)  Motor:    5/5 EPL, Finger Abduction, , Pinch, Deltoid  Sensation:   intact to light touch median, ulnar, radial and axillary nerve  Circulation:   Normal, 2+ radial pulse    The contralateral upper extremity is without limitation in range of motion or strength, no positive provocative maneuvers.      Radiographic Examination/Diagnostics:    Shoulder MRI personally viewed, independently interpreted and radiology report was reviewed.     MRI SHOULDER, RIGHT (CPT=73221)     Result Date: 9/26/2024  PROCEDURE:  MRI SHOULDER, RIGHT (CPT=73221)  COMPARISON:  FABIO [facility], FABIO SHOULDER, COMPLETE (MIN 2 VIEWS), RIGHT (CPT=73030), 8/26/2024, 3:54 PM.  INDICATIONS:  Right shoulder pain and limited range of motion.  TECHNIQUE:  Multiplanar imaging of the shoulder including oblique coronal, axial and sagittal imaging was acquired including proton density fat suppression technique. Images were performed without intravenous gadolinium.  PATIENT STATED HISTORY: (As transcribed by Technologist)  Patient has  throbbing pain in the right shoulder with limited range of motion.    FINDINGS:  ROTATOR CUFF:  There is moderate rotator cuff tendinopathy involving the supraspinatus and infraspinatus tendons diffusely with evidence of low-grade partial thickness intrasubstance tearing of the supraspinatus tendon with mild intrasubstance delamination into the myotendinous junction.  No high-grade or full-thickness tear.  There is rotator cuff enthesopathy at the greater tuberosity insertion of the infraspinatus tendon with subcortical cystic changes noted. MUSCULATURE:  No acute muscular strains, edema or atrophy. AC JOINT/ACROMION:  There is mild AC joint arthropathy.  The acromion is type 2.  There is mild subacromial enthesopathy and narrowing of the subacromial arch consistent with extrinsic rotator cuff impingement.  There is associated mild subacromial/subdeltoid bursitis. BICEPS/LABRAL COMPLEX:  Long head of the biceps tendon is intact with mild intra-articular biceps tendinosis.  The biceps labral anchor reveals mild increased T2 signal suggesting a subtle type 2 slap lesion.  The remainder of the glenoid labrum is unremarkable.  The glenohumeral ligaments are unremarkable. GLENOHUMERAL JOINT:  No significant arthritis or acute injury.  Normal marrow and cortical signal.  Unremarkable capsular insertions.  No effusion.              CONCLUSION:    1. Moderate rotator cuff tendinopathy involving the supraspinatus and infraspinatus tendons diffusely, with associated low-grade partial thickness intrasubstance tear with mild intrasubstance delamination extending into the myotendinous junction of supraspinatus.  Associated rotator cuff enthesopathy.   2. Type 2 acromion with subacromial enthesopathy.  Secondary narrowing of the subacromial arch and subacromial/subdeltoid bursitis, consistent with extrinsic rotator cuff impingement.   3. Mild intra-articular biceps tendinosis, with some slight increased T2 signal extending into  the biceps labral anchor suggesting a type 2 slap lesion.     LOCATION:  [facility]   Dictated by (CST): Riri Garza DO on 9/26/2024 at 3:54 PM     Finalized by (CST): Riri Garza DO on 9/26/2024 at 4:02 PM       IMPRESSION: Coleman Gastelum is a 49 year old with left subacromial impingement. Right shoulder improved with physical therapy and a corticosteroid injection on 10/16/24.    Similarly, treatment is amenable to intra-articular corticosteroid and ketorolac injection coupled with physical therapy.     PLAN:   We reviewed the treatment of this disease condition.  Fortunately, treatment is amenable to conservative treatment which we chose to optimize at today's visit.  After a discussion of a variety of conservative treatment options, I recommended intra-articular injection with corticosteroid and ketorolac coupled with physical therapy to aid in strengthening, range of motion, functional improvement, and return to baseline activity.       We elected to proceed with the injection procedure at today's visit. We discussed the risk and benefits of the procedure; including, but not limited to: infection, injury to blood vessels, nerve injury, prolonged pain, swelling, site soreness, failure to progress, and need for advanced treatments.  The patient voiced understanding and agreed to proceed with the treatment plan.      FOLLOW-UP:   Return to clinic on an as needed basis.       Charlee Avendaño MD  Knee, Shoulder, & Elbow Surgery / Sports Medicine Specialist  Orthopaedic Surgery  [address] [place] [address] [id number] [place] [email or web address]  [email or web address]  t: [phone]  o: [phone]  f: [phone]    This note was dictated using Dragon software.  While it was briefly proofread prior to completion, some grammatical, spelling, and word choice errors due to dictation may still occur.

## 2024-12-16 NOTE — PROCEDURES
Left Shoulder Glenohumeral Joint Injection    Name: Coleman Gastelum   MRN: [id number]  Date: 12/16/2024     Clinical Indications:   Subacromial impingement with symptoms refractory to conservative measures.     After informed consent, the injection site was marked, sterilized with topical chlorhexidine antiseptic, and locally anesthetized with skin refrigerant.    The patient was seated upright and the shoulder was exposed. Using sterile technique: 1 mL of 30mg/mL of Ketorolac, 2 mL of 0.5% Bupivicaine, 2 mL of 1% Lidocaine, and 1 mg of 40mg/mL of Triamcinolone (Kenalog) was injected with a Anterior approach utilizing a 22 gauge needle.  A band-aid was applied.  The patient tolerated the procedure well.        Charlee Avendaño MD  Knee, Shoulder, & Elbow Surgery / Sports Medicine Specialist  Orthopaedic Surgery  [address] [place] [address] [id number] [place] [email or web address]  t: [phone]  o: [phone]  f: [phone]

## 2024-12-17 ENCOUNTER — OFFICE VISIT (OUTPATIENT)
Dept: PHYSICAL THERAPY | Facility: HOSPITAL | Age: 49
End: 2024-12-17
Attending: ORTHOPAEDIC SURGERY
Payer: COMMERCIAL

## 2024-12-17 PROCEDURE — 97140 MANUAL THERAPY 1/> REGIONS: CPT

## 2024-12-17 PROCEDURE — 97110 THERAPEUTIC EXERCISES: CPT

## 2024-12-17 NOTE — PROGRESS NOTES
Diagnosis:   Tendinitis of right rotator cuff (M75.81)  Subacromial impingement of right shoulder (M75.41)  Biceps tendinitis of right upper extremity (M75.21)      Referring Provider: Charlee Avendaño  Date of Evaluation:    11/4/2024    Precautions:  None    QuickDASH Outcome Score  Score: (Patient-Rptd) 29.55 % (11/4/2024  2:14 PM) Next MD visit:   none scheduled  Date of Surgery: n/a   Insurance Primary/Secondary: [facility] HMO / N/A     # Auth Visits: 5  10/16-12/31            Subjective: Doing HEP. Cortisone shot helped R shoulder. Got cortisone shot L shoulder yesterday. Had some difficulty putting on coat L shoulder this morning.    Pain: 0/10 R shoulder-currently but increases with activity   3-4/10 L shoulder-was 8/10 yesterday      Objective: Tx flow sheet       Observation/Posture: mm tightness visible upper trap, cervical upper trap and levator scapular, L scapula lateral winging  Palpation:  excessive mm tightness B upper trap, mid trap, rhomboid, cervical paraspinals, tenderness R>L supraspinatus mm/tendon    AROM: (* denotes performed with pain)  Shoulder    Flexion: B 150 (was:R 140; L 140)  Abduction: B 160 (was:R 140*; L 150)  ER: R 65*; L 65*  IR: R T10*; L T8*     Subacromial Pain Syndrome:  Empty Can test: R pain  Velázquez-Willian Impingement Sign: R some pain  Palpable Tenderness Infraspinatus and Supraspinatus: R +  Rotator Cuff Tears:   Dropping Sign at 90 Deg ABD and 45 deg ER: R pain,  IR Lag Sign: R pain, L pain    Strength/MMT: (* denotes performed with pain)  Shoulder Elbow Scapular   Flexion: R 4-*/5; L 4/5  Abduction: R 4-*/5; L 4/5  ER: R 5-/5; L 5/5  IR: R 5/5; L 5/5 Flexion: R 5/5; L 5/5  Extension: R 5/5; L 5/5  Supination: R 5/5; L 5/5  Pronation: R 5/5; L 5/5  Rhomboids: R 4-/5,* L 4/5  Mid trap: R 4-/5*; L 4/5   Lats: R 4-/5*, L 4/5  Low trap: R 4-/5*; L 4/5       Assessment: Good tolerance to progression of RC strengthening. Full, PROM B flexion and scaption. Noted R>L shoulder IR  capsule tightness. Reduction of mm tightness after Manual therapy.      Goals:   Goals: (to be met in 10 visits) -progressing  Pt will report improved ability to sleep without waking due to shoulder pain   Pt will improve shoulder flexion AROM to >/=160 degrees to be able to reach into overhead cabinets without pain or restriction   Pt will improve shoulder abduction AROM to >170 degrees to improve ability to don deodorant, don/doff shirts, and wash hair   Pt will increase shoulder AROM ER to 85 to be able to reach and fasten seatbelt   Pt will increase shoulder AROM IR to 70 to be able to reach in back pocket, tuck in shirt, and turn steering wheel without pain  Pt will improve shoulder strength throughout to 5-/5 to improve function with lifting and reaching   Pt will demonstrate increased mid/low trap strength to 5-/5 to promote improved shoulder mechanics and stabilization with lifting and reaching   Pt will be independent and compliant with comprehensive HEP to maintain progress achieved in PT     Plan: Continue plan of care as pt tolerates with focus on R shoulder rehab.   Date: 12/17/2024  TX#: 2/10 Date:                 TX#: 3/ Date:                 TX#: 4/ Date:                 TX#: 5/ Date:   Tx#: 6/   TE  Reviewed HEP  Pec stretch 30 sec x2  Sleeper's stretch 30 sec x2  PROM R/L shoulder -distraction with IR R  B shoulder flexion x5 w scap retraction and depession  IR RTB x15 R/L  ER RTB x15 R/L  Upper trap stretch 30 sec R/L  Lev scap stretch 30 sec R/L    Posture ed           MT  STM upper traps, levator scap, generalized B shoulder mm-focus on posterior teres/insertions    B GH joint AP, PA gr II-III multiple bouts     MT:B upper/middle trap, rhomboid, cervical paraspinals       HEP:   - Sleeper Stretch  - 2 x daily - 7 x weekly - 4 sets - 25 hold  - Doorway Pec Stretch at 60 Degrees Abduction with Arm Straight  - 2 x daily - 7 x weekly - 2 sets - 5 reps - 10 hold  - Standing Shoulder Posterior Capsule  Stretch  - 2 x daily - 7 x weekly - 3 sets - 15 hold  - Standing Shoulder Flexion to 90 Degrees  - 2 x daily - 7 x weekly - 2 sets - 10 reps  - Shoulder Abduction - Thumbs Up  - 2 x daily - 7 x weekly - 2 sets - 10 reps  - Standing Shoulder Row with Anchored Resistance  - 2 x daily - 7 x weekly - 2 sets - 8 reps  - Shoulder extension with resistance - Neutral  - 2 x daily - 7 x weekly - 2 sets - 10 reps  - Shoulder Internal Rotation with Resistance  - 2 x daily - 7 x weekly - 2 sets - 10 reps  - Shoulder External Rotation with Anchored Resistance  - 2 x daily - 7 x weekly - 2 sets - 10 reps  - Supine Shoulder Flexion Extension AAROM with Dowel  - 2 x daily - 7 x weekly - 2 sets - 10 reps - 3 hold  - Standing Shoulder Abduction Wall Slide with Thumb Out  - 2 x daily - 7 x weekly - 2 sets - 10 reps  - Seated Gentle Upper Trapezius Stretch  - 2 x daily - 7 x weekly - 3 sets - 30 hold  - Gentle Levator Scapulae Stretch  - 2 x daily - 7 x weekly - 3 sets - 30 hold    Charges: TE2 30 MT 15       Total Timed Treatment: 45 min  Total Treatment Time: 45 min

## 2024-12-19 ENCOUNTER — OFFICE VISIT (OUTPATIENT)
Dept: PHYSICAL THERAPY | Facility: HOSPITAL | Age: 49
End: 2024-12-19
Attending: ORTHOPAEDIC SURGERY
Payer: COMMERCIAL

## 2024-12-19 PROCEDURE — 97140 MANUAL THERAPY 1/> REGIONS: CPT

## 2024-12-19 PROCEDURE — 97110 THERAPEUTIC EXERCISES: CPT

## 2024-12-19 NOTE — PROGRESS NOTES
Diagnosis:   Tendinitis of right rotator cuff (M75.81)  Subacromial impingement of right shoulder (M75.41)  Biceps tendinitis of right upper extremity (M75.21)      Referring Provider: Charlee Avendaño  Date of Evaluation:    11/4/2024    Precautions:  None    QuickDASH Outcome Score  Score: (Patient-Rptd) 29.55 % (11/4/2024  2:14 PM) Next MD visit:   none scheduled  Date of Surgery: n/a   [facility]     # Auth Visits: 5  10/16-12/31            Subjective: Doing HEP which is helping.     Pain: 0/10 R shoulder-currently but increases with activity   3-4/10 L shoulder-was 8/10 yesterday      Objective: Tx flow sheet       Observation/Posture: mm tightness visible upper trap, cervical upper trap and levator scapular, L scapula lateral winging  Palpation:  excessive mm tightness B upper trap, mid trap, rhomboid, cervical paraspinals, tenderness R>L supraspinatus mm/tendon    AROM: (* denotes performed with pain)  Shoulder    Flexion: B 150 (was:R 140; L 140)  Abduction: B 160 (was:R 140*; L 150)  ER: R 65*; L 65*  IR: R T10*; L T8*     Subacromial Pain Syndrome:  Empty Can test: R pain  Velázquez-Willian Impingement Sign: R some pain  Palpable Tenderness Infraspinatus and Supraspinatus: R +  Rotator Cuff Tears:   Dropping Sign at 90 Deg ABD and 45 deg ER: R pain,  IR Lag Sign: R pain, L pain    Strength/MMT: (* denotes performed with pain)  Shoulder Elbow Scapular   Flexion: R 4-*/5; L 4/5  Abduction: R 4-*/5; L 4/5  ER: R 5-/5; L 5/5  IR: R 5/5; L 5/5 Flexion: R 5/5; L 5/5  Extension: R 5/5; L 5/5  Supination: R 5/5; L 5/5  Pronation: R 5/5; L 5/5  Rhomboids: R 4-/5,* L 4/5  Mid trap: R 4-/5*; L 4/5   Lats: R 4-/5*, L 4/5  Low trap: R 4-/5*; L 4/5       Assessment: Good tolerance to progression of RC strengthening with progression to Precor strengthening and wall pushups. Full, PROM B flexion and scaption. Noted R>L shoulder IR capsule tightness. Reduction of mm tightness after Manual  therapy. Pt education: progression to returning to lite weightlifting every other day.      Goals:   Goals: (to be met in 10 visits) -progressing  Pt will report improved ability to sleep without waking due to shoulder pain   Pt will improve shoulder flexion AROM to >/=160 degrees to be able to reach into overhead cabinets without pain or restriction   Pt will improve shoulder abduction AROM to >170 degrees to improve ability to don deodorant, don/doff shirts, and wash hair   Pt will increase shoulder AROM ER to 85 to be able to reach and fasten seatbelt   Pt will increase shoulder AROM IR to 70 to be able to reach in back pocket, tuck in shirt, and turn steering wheel without pain  Pt will improve shoulder strength throughout to 5-/5 to improve function with lifting and reaching   Pt will demonstrate increased mid/low trap strength to 5-/5 to promote improved shoulder mechanics and stabilization with lifting and reaching   Pt will be independent and compliant with comprehensive HEP to maintain progress achieved in PT     Plan: Continue plan of care as pt tolerates with focus on R shoulder rehab. Next visit: increase weights with lifting  Date: 12/17/2024  TX#: 2/10 Date:  12/19/2024                TX#: 3/ Date:                 TX#: 4/ Date:                 TX#: 5/ Date:   Tx#: 6/   TE  Reviewed HEP  Pec stretch 30 sec x2  Sleeper's stretch 30 sec x2  PROM R/L shoulder -distraction with IR R  B shoulder flexion x5 w scap retraction and depession  IR RTB x15 R/L  ER YTB x15 R/L  Upper trap stretch 30 sec R/L  Lev scap stretch 30 sec R/L    Posture ed     TE  Sci fit F/B 3 min ea  Precor-  Scap retraction 10# 2x10    Prceor-  Scap retraction + sh ext 10# x10    Wall wipes w ball x10  Wall push ups w ball x10  Pec stretch 30 sec x2  Sleeper's stretch 30 sec x2  PROM R/L shoulder -distraction with IR R  B shoulder flexion x5 w scap retraction and depession  IR RTB x15 R/L  ER YTB x15 R/L  Upper trap stretch 30 sec  R/L  Lev scap stretch 30 sec R/L    Posture ed      MT  STM upper traps, levator scap, generalized B shoulder mm-focus on posterior teres/insertions    B GH joint AP, PA gr II-III multiple bouts     MT:B upper/middle trap, rhomboid, cervical paraspinals MT  STM upper traps, levator scap, generalized B shoulder mm-focus on posterior teres/insertions    B GH joint AP, PA gr II-III multiple bouts     MT:B upper/middle trap, rhomboid, cervical paraspinals      HEP:   - Sleeper Stretch  - 2 x daily - 7 x weekly - 4 sets - 25 hold  - Doorway Pec Stretch at 60 Degrees Abduction with Arm Straight  - 2 x daily - 7 x weekly - 2 sets - 5 reps - 10 hold  - Standing Shoulder Posterior Capsule Stretch  - 2 x daily - 7 x weekly - 3 sets - 15 hold  - Standing Shoulder Flexion to 90 Degrees  - 2 x daily - 7 x weekly - 2 sets - 10 reps  - Shoulder Abduction - Thumbs Up  - 2 x daily - 7 x weekly - 2 sets - 10 reps  - Standing Shoulder Row with Anchored Resistance  - 2 x daily - 7 x weekly - 2 sets - 8 reps  - Shoulder extension with resistance - Neutral  - 2 x daily - 7 x weekly - 2 sets - 10 reps  - Shoulder Internal Rotation with Resistance  - 2 x daily - 7 x weekly - 2 sets - 10 reps  - Shoulder External Rotation with Anchored Resistance  - 2 x daily - 7 x weekly - 2 sets - 10 reps  - Supine Shoulder Flexion Extension AAROM with Dowel  - 2 x daily - 7 x weekly - 2 sets - 10 reps - 3 hold  - Standing Shoulder Abduction Wall Slide with Thumb Out  - 2 x daily - 7 x weekly - 2 sets - 10 reps  - Seated Gentle Upper Trapezius Stretch  - 2 x daily - 7 x weekly - 3 sets - 30 hold  - Gentle Levator Scapulae Stretch  - 2 x daily - 7 x weekly - 3 sets - 30 hold    Charges: TE2 30 MT 15       Total Timed Treatment: 45 min  Total Treatment Time: 45 min

## 2024-12-24 ENCOUNTER — HOSPITAL ENCOUNTER (OUTPATIENT)
Dept: MAMMOGRAPHY | Facility: HOSPITAL | Age: 49
Discharge: HOME | End: 2024-12-24
Attending: FAMILY MEDICINE
Payer: COMMERCIAL

## 2024-12-24 DIAGNOSIS — Z12.31 SCREENING MAMMOGRAM, ENCOUNTER FOR: ICD-10-CM

## 2024-12-24 PROCEDURE — 77067 SCR MAMMO BI INCL CAD: CPT | Performed by: FAMILY MEDICINE

## 2024-12-24 PROCEDURE — 77063 BREAST TOMOSYNTHESIS BI: CPT | Performed by: FAMILY MEDICINE

## 2024-12-31 ENCOUNTER — OFFICE VISIT (OUTPATIENT)
Dept: PHYSICAL THERAPY | Facility: HOSPITAL | Age: 49
End: 2024-12-31
Attending: ORTHOPAEDIC SURGERY
Payer: COMMERCIAL

## 2024-12-31 PROCEDURE — 97110 THERAPEUTIC EXERCISES: CPT

## 2024-12-31 PROCEDURE — 97140 MANUAL THERAPY 1/> REGIONS: CPT

## 2024-12-31 NOTE — PROGRESS NOTES
Diagnosis:   Tendinitis of right rotator cuff (M75.81)  Subacromial impingement of right shoulder (M75.41)  Biceps tendinitis of right upper extremity (M75.21)      Referring Provider: Charlee Avendaño  Date of Evaluation:    11/4/2024    Precautions:  None    QuickDASH Outcome Score  Score: (Patient-Rptd) 29.55 % (11/4/2024  2:14 PM) Next MD visit:   none scheduled  Date of Surgery: n/a   Insurance Primary/Secondary: [employer] [place] HMO / N/A     # Auth Visits: 5  10/16-12/31            Subjective: Shoulders feeling much better.  Doing HEP.     Pain: 0/10 R shoulder-currently but increases with activity   2-3/10 L shoulder      Objective: Tx flow sheet   Shoulder flexion-full ROM anti-gravity but R shoulder elevation with scapular lateral winging      Observation/Posture: mm tightness visible upper trap, cervical upper trap and levator scapular, L scapula lateral winging  Palpation:  excessive mm tightness B upper trap, mid trap, rhomboid, cervical paraspinals, tenderness R>L supraspinatus mm/tendon    AROM: (* denotes performed with pain)  Shoulder    Flexion: B 170 (was:R 140; L 140)  Abduction: B 170 (was:R 140*; L 150)  ER: B 80 (was:R 65*; L 65*)  IR: B T7 (was:R T10*; L T8*)     Subacromial Pain Syndrome:  Empty Can test: R pain  Velázquez-Willian Impingement Sign: R some pain  Palpable Tenderness Infraspinatus and Supraspinatus: R +  Rotator Cuff Tears:   Dropping Sign at 90 Deg ABD and 45 deg ER: R pain,  IR Lag Sign: R pain, L pain    Strength/MMT: (* denotes performed with pain)  Shoulder Elbow Scapular   Flexion: R 4-*/5; L 4/5  Abduction: R 4-*/5; L 4/5  ER: R 5-/5; L 5/5  IR: R 5/5; L 5/5 Flexion: R 5/5; L 5/5  Extension: R 5/5; L 5/5  Supination: R 5/5; L 5/5  Pronation: R 5/5; L 5/5  Rhomboids: R 4-/5,* L 4/5  Mid trap: R 4-/5*; L 4/5   Lats: R 4-/5*, L 4/5  Low trap: R 4-/5*; L 4/5       Assessment: Good tolerance to progression of RC strengthening with progression to light weights  and lat pulldowns. Issued  upgraded HEP. Full, PROM B flexion and scaption. Noted R>L shoulder IR capsule tightness-resolving. Reduction of mm tightness after Manual therapy. Manual and tactile cueing for maintenance of correct SH rhythm. Pt education: ergonomics with focus on avoiding subacromial impingement positions.      Goals:   Goals: (to be met in 10 visits) -progressing  Pt will report improved ability to sleep without waking due to shoulder pain   Pt will improve shoulder flexion AROM to >/=160 degrees to be able to reach into overhead cabinets without pain or restriction   Pt will improve shoulder abduction AROM to >170 degrees to improve ability to don deodorant, don/doff shirts, and wash hair   Pt will increase shoulder AROM ER to 85 to be able to reach and fasten seatbelt   Pt will increase shoulder AROM IR to 70 to be able to reach in back pocket, tuck in shirt, and turn steering wheel without pain  Pt will improve shoulder strength throughout to 5-/5 to improve function with lifting and reaching   Pt will demonstrate increased mid/low trap strength to 5-/5 to promote improved shoulder mechanics and stabilization with lifting and reaching   Pt will be independent and compliant with comprehensive HEP to maintain progress achieved in PT     Plan: Continue plan of care as pt tolerates with focus on R shoulder rehab. Next visit: discharge  Date: 12/17/2024  TX#: 2/10 Date:  12/19/2024                TX#: 3/ Date:        12/31/2024          TX#: 4/ Date:                 TX#: 5/   TE  Reviewed HEP  Pec stretch 30 sec x2  Sleeper's stretch 30 sec x2  PROM R/L shoulder -distraction with IR R  B shoulder flexion x5 w scap retraction and depession  IR RTB x15 R/L  ER YTB x15 R/L  Upper trap stretch 30 sec R/L  Lev scap stretch 30 sec R/L    Posture ed     TE  Sci fit F/B 3 min ea  Precor-  Scap retraction 10# 2x10    Prceor-  Scap retraction + sh ext 10# x10    Wall wipes w ball x10  Wall push ups w ball x10  Pec stretch 30 sec  x2  Sleeper's stretch 30 sec x2  PROM R/L shoulder -distraction with IR R  B shoulder flexion x5 w scap retraction and depession  IR RTB x15 R/L  ER YTB x15 R/L  Upper trap stretch 30 sec R/L  Lev scap stretch 30 sec R/L    Posture ed TE  Sci fit F/B 3 min ea [place] L2    Precor-  Scap retraction 15# 2x10    Precor-  Scap retraction + sh ext 10# x10    Lat pulldown grey t-band x12    B shoulder flexion 1# x10-HEP    B shoulder scaption 1# x10-HEP    SL ER 1# x10-HEP  SL scaption 1# x10-HEP    Ergonomics    Posture education TE  Progress note    MT  STM upper traps, levator scap, generalized B shoulder mm-focus on posterior teres/insertions    B GH joint AP, PA gr II-III multiple bouts     MT:B upper/middle trap, rhomboid, cervical paraspinals MT  STM upper traps, levator scap, generalized B shoulder mm-focus on posterior teres/insertions    B GH joint AP, PA gr II-III multiple bouts     MT:B upper/middle trap, rhomboid, cervical paraspinals MT  STM R upper traps, levator scap, generalized R shoulder mm-focus on anterior and posterior mm insertions    B GH joint AP, PA gr II-III multiple bouts         HEP:   - Sleeper Stretch  - 2 x daily - 7 x weekly - 4 sets - 25 hold  - Doorway Pec Stretch at 60 Degrees Abduction with Arm Straight  - 2 x daily - 7 x weekly - 2 sets - 5 reps - 10 hold  - Standing Shoulder Posterior Capsule Stretch  - 2 x daily - 7 x weekly - 3 sets - 15 hold  - Standing Shoulder Flexion to 90 Degrees  - 2 x daily - 7 x weekly - 2 sets - 10 reps  - Shoulder Abduction - Thumbs Up  - 2 x daily - 7 x weekly - 2 sets - 10 reps  - Standing Shoulder Row with Anchored Resistance  - 2 x daily - 7 x weekly - 2 sets - 8 reps  - Shoulder extension with resistance - Neutral  - 2 x daily - 7 x weekly - 2 sets - 10 reps  - Shoulder Internal Rotation with Resistance  - 2 x daily - 7 x weekly - 2 sets - 10 reps  - Shoulder External Rotation with Anchored Resistance  - 2 x daily - 7 x weekly - 2 sets - 10 reps  -  Supine Shoulder Flexion Extension AAROM with Dowel  - 2 x daily - 7 x weekly - 2 sets - 10 reps - 3 hold  - Standing Shoulder Abduction Wall Slide with Thumb Out  - 2 x daily - 7 x weekly - 2 sets - 10 reps  - Seated Gentle Upper Trapezius Stretch  - 2 x daily - 7 x weekly - 3 sets - 30 hold  - Gentle Levator Scapulae Stretch  - 2 x daily - 7 x weekly - 3 sets - 30 hold    Access Code: [email or web address]  URL: [email or web address]  Date: 12/31/2024  Prepared by: Savi Stoll    Exercises  - Standing Shoulder Flexion Full Range  - 1 x daily - 7 x weekly - 1 sets - 10 reps  - Shoulder Scaption at Wall Thumbs Up  - 1 x daily - 7 x weekly - 1 sets - 10 reps  - Sidelying Shoulder External Rotation  - 1 x daily - 7 x weekly - 1 sets - 10 reps  - Sidelying Shoulder Scaption  - 1 x daily - 7 x weekly - 1 sets - 10 reps    Charges: TE2 30 MT 15       Total Timed Treatment: 45 min  Total Treatment Time: 45 min

## 2025-01-02 ENCOUNTER — OFFICE VISIT (OUTPATIENT)
Dept: PHYSICAL THERAPY | Facility: HOSPITAL | Age: 50
End: 2025-01-02
Attending: ORTHOPAEDIC SURGERY
Payer: COMMERCIAL

## 2025-01-02 PROCEDURE — 97110 THERAPEUTIC EXERCISES: CPT

## 2025-01-02 PROCEDURE — 97140 MANUAL THERAPY 1/> REGIONS: CPT

## 2025-01-02 NOTE — PROGRESS NOTES
Diagnosis:   Tendinitis of right rotator cuff (M75.81)  Subacromial impingement of right shoulder (M75.41)  Biceps tendinitis of right upper extremity (M75.21)      Referring Provider: Charlee Avendaño  Date of Evaluation:    11/4/2024    Precautions:  None    QuickDASH Outcome Score  Score: (Patient-Rptd) 29.55 % (11/4/2024  2:14 PM) Next MD visit:   none scheduled  Date of Surgery: n/a   [facility]     # Auth Visits: 5  10/16-12/31 , 4 1/1-4/1/2025            Subjective: Shoulders feeling much better but has pinching R shoulder with rotation movements.    Pain: 0/10 R shoulder-currently but increases with activity   2-3/10 L shoulder      Objective: Tx flow sheet   Shoulder flexion-full ROM anti-gravity but R shoulder elevation with scapular lateral winging      Observation/Posture: mm tightness visible upper trap, cervical upper trap and levator scapular, L scapula lateral winging  Palpation:  excessive mm tightness B upper trap, mid trap, rhomboid, cervical paraspinals, tenderness R>L supraspinatus mm/tendon    AROM: (* denotes performed with pain)  Shoulder    Flexion: B 170 (was:R 140; L 140)  Abduction: B 170 (was:R 140*; L 150)  ER: B 80 (was:R 65*; L 65*)  IR: B T7 (was:R T10*; L T8*)     Subacromial Pain Syndrome:  Empty Can test: R pain  Velázquez-Willian Impingement Sign: R some pain  Palpable Tenderness Infraspinatus and Supraspinatus: R +  Rotator Cuff Tears:   Dropping Sign at 90 Deg ABD and 45 deg ER: R pain,  IR Lag Sign: R pain, L pain    Strength/MMT: (* denotes performed with pain)  Shoulder Elbow Scapular   Flexion: R 4-*/5; L 4/5  Abduction: R 4-*/5; L 4/5  ER: R 5-/5; L 5/5  IR: R 5/5; L 5/5 Flexion: R 5/5; L 5/5  Extension: R 5/5; L 5/5  Supination: R 5/5; L 5/5  Pronation: R 5/5; L 5/5  Rhomboids: R 4-/5,* L 4/5  Mid trap: R 4-/5*; L 4/5   Lats: R 4-/5*, L 4/5  Low trap: R 4-/5*; L 4/5       Assessment: Good tolerance to progression of RC strengthening with  progression to increased weights  and lat pulldowns with addition of rotation in sitting position. Upgraded HEP. Incr difficulty on scifit . Full, PROM B flexion and scaption. Noted R>L shoulder IR capsule tightness-resolving. Reduction of mm tightness after Manual therapy. Manual and tactile cueing for maintenance of correct SH rhythm. Pt education: ergonomics with focus on avoiding subacromial impingement positions.      Goals:   Goals: (to be met in 10 visits) -progressing  Pt will report improved ability to sleep without waking due to shoulder pain   Pt will improve shoulder flexion AROM to >/=160 degrees to be able to reach into overhead cabinets without pain or restriction   Pt will improve shoulder abduction AROM to >170 degrees to improve ability to don deodorant, don/doff shirts, and wash hair   Pt will increase shoulder AROM ER to 85 to be able to reach and fasten seatbelt   Pt will increase shoulder AROM IR to 70 to be able to reach in back pocket, tuck in shirt, and turn steering wheel without pain  Pt will improve shoulder strength throughout to 5-/5 to improve function with lifting and reaching   Pt will demonstrate increased mid/low trap strength to 5-/5 to promote improved shoulder mechanics and stabilization with lifting and reaching   Pt will be independent and compliant with comprehensive HEP to maintain progress achieved in PT     Plan: Continue plan of care as pt tolerates with focus on R shoulder rehab. Next visit:   Date: 12/17/2024  TX#: 2/10 Date:  12/19/2024                TX#: 3/ Date:        12/31/2024          TX#: 4/ Date:      1/2/2025            TX#: 5/    TE  Reviewed HEP  Pec stretch 30 sec x2  Sleeper's stretch 30 sec x2  PROM R/L shoulder -distraction with IR R  B shoulder flexion x5 w scap retraction and depession  IR RTB x15 R/L  ER YTB x15 R/L  Upper trap stretch 30 sec R/L  Lev scap stretch 30 sec R/L    Posture ed     TE  Sci fit F/B 3 min ea  Precor-  Scap retraction 10#  2x10    Prceor-  Scap retraction + sh ext 10# x10    Wall wipes w ball x10  Wall push ups w ball x10  Pec stretch 30 sec x2  Sleeper's stretch 30 sec x2  PROM R/L shoulder -distraction with IR R  B shoulder flexion x5 w scap retraction and depession  IR RTB x15 R/L  ER YTB x15 R/L  Upper trap stretch 30 sec R/L  Lev scap stretch 30 sec R/L    Posture ed TE  Sci fit F/B 3 min ea [place] L2    Precor-  Scap retraction 15# 2x10    Precor-  Scap retraction + sh ext 10# x10    Lat pulldown grey t-band x12    B shoulder flexion 1# x10-HEP    B shoulder scaption 1# x10-HEP    SL ER 1# x10-HEP  SL scaption 1# x10-HEP    Ergonomics    Posture education TE  sci fit F/B 3 min ea [place] L3    Precor-  Scap retraction 15# 2x10  ER 10# 2x5 R/L  IR 10# x10 R/L    Lat pulldown grey t-band 2x12    Supine-serratus push 1# x10    Wall pushups w +  X10    Sitting-IR/ER RTB R x10-HEP        Ergonomics-reviewed    Posture education      MT  STM upper traps, levator scap, generalized B shoulder mm-focus on posterior teres/insertions    B GH joint AP, PA gr II-III multiple bouts     MT:B upper/middle trap, rhomboid, cervical paraspinals MT  STM upper traps, levator scap, generalized B shoulder mm-focus on posterior teres/insertions    B GH joint AP, PA gr II-III multiple bouts     MT:B upper/middle trap, rhomboid, cervical paraspinals MT  STM R upper traps, levator scap, generalized R shoulder mm-focus on anterior and posterior mm insertions    B GH joint AP, PA gr II-III multiple bouts      MT  STM R upper traps, levator scap, generalized R shoulder mm-focus on anterior and posterior mm insertions, RC mm    B GH joint AP, PA gr II-III multiple bouts     HEP:   - Sleeper Stretch  - 2 x daily - 7 x weekly - 4 sets - 25 hold  - Doorway Pec Stretch at 60 Degrees Abduction with Arm Straight  - 2 x daily - 7 x weekly - 2 sets - 5 reps - 10 hold  - Standing Shoulder Posterior Capsule Stretch  - 2 x daily - 7 x weekly - 3 sets - 15 hold  - Standing  Shoulder Flexion to 90 Degrees  - 2 x daily - 7 x weekly - 2 sets - 10 reps  - Shoulder Abduction - Thumbs Up  - 2 x daily - 7 x weekly - 2 sets - 10 reps  - Standing Shoulder Row with Anchored Resistance  - 2 x daily - 7 x weekly - 2 sets - 8 reps  - Shoulder extension with resistance - Neutral  - 2 x daily - 7 x weekly - 2 sets - 10 reps  - Shoulder Internal Rotation with Resistance  - 2 x daily - 7 x weekly - 2 sets - 10 reps  - Shoulder External Rotation with Anchored Resistance  - 2 x daily - 7 x weekly - 2 sets - 10 reps  - Supine Shoulder Flexion Extension AAROM with Dowel  - 2 x daily - 7 x weekly - 2 sets - 10 reps - 3 hold  - Standing Shoulder Abduction Wall Slide with Thumb Out  - 2 x daily - 7 x weekly - 2 sets - 10 reps  - Seated Gentle Upper Trapezius Stretch  - 2 x daily - 7 x weekly - 3 sets - 30 hold  - Gentle Levator Scapulae Stretch  - 2 x daily - 7 x weekly - 3 sets - 30 hold    Access Code: [email or web address]  URL: [email or web address]  Date: 12/31/2024  Prepared by: Savi Stoll    Exercises  - Standing Shoulder Flexion Full Range  - 1 x daily - 7 x weekly - 1 sets - 10 reps  - Shoulder Scaption at Wall Thumbs Up  - 1 x daily - 7 x weekly - 1 sets - 10 reps  - Sidelying Shoulder External Rotation  - 1 x daily - 7 x weekly - 1 sets - 10 reps  - Sidelying Shoulder Scaption  - 1 x daily - 7 x weekly - 1 sets - 10 reps    Charges: TE2 30 MT 15       Total Timed Treatment: 45 min  Total Treatment Time: 45 min

## 2025-01-07 ENCOUNTER — OFFICE VISIT (OUTPATIENT)
Dept: PHYSICAL THERAPY | Facility: HOSPITAL | Age: 50
End: 2025-01-07
Attending: ORTHOPAEDIC SURGERY
Payer: COMMERCIAL

## 2025-01-07 PROCEDURE — 97140 MANUAL THERAPY 1/> REGIONS: CPT

## 2025-01-07 PROCEDURE — 97110 THERAPEUTIC EXERCISES: CPT

## 2025-01-07 NOTE — PROGRESS NOTES
Discharge Summary  Pt has attended 6 visits in Physical Therapy.      Diagnosis:   Tendinitis of right rotator cuff (M75.81)  Subacromial impingement of right shoulder (M75.41)  Biceps tendinitis of right upper extremity (M75.21)         Date of Evaluation:    11/4/2024     Next MD visit:   none scheduled  Date of Surgery: n/a   Insurance Primary/Secondary: [employer] [place] HMO / N/A     # Auth Visits: 5  10/16-12/31 , 4 1/1-4/1/2025            Subjective: Shoulders feel much better. Pt reports that she understands modifications with weight and activities of daily living to not aggravate shoulders again.  Modifying exercise program -typically was doing shoulder abduction with 8# and forward planks.  Pt reports that she feels confident to continue HEP independently.     Pain: 0/10 R/L shoulder-currently but increases with activity to 2/10      Objective: Tx flow sheet     CAPITALIZATION = PROGRESS NOTE UPDATES      Observation/Posture: mm tightness visible upper trap, cervical upper trap and levator scapular, L scapula lateral winging-NEUTRAL POSTURE   Palpation:  excessive mm tightness B upper trap, mid trap, rhomboid, cervical paraspinals, tenderness R>L supraspinatus mm/tendon-MINIMAL MM TIGHTNESS AND TENDERNESS    AROM: (* denotes performed with pain)  Shoulder    Flexion: B 170 (was:R 140; L 140)  Abduction: B 170 (was:R 140*; L 150)  ER: B 90 (was:R 65*; L 65*)  IR: B T6 (was:R T10*; L T8*)     Subacromial Pain Syndrome:  Empty Can test: R NEG (WAS:pain)  Velázquez-Willian Impingement Sign: NEG (WAS:R some pain)  Palpable Tenderness Infraspinatus and Supraspinatus: R - (WAS:+)  Rotator Cuff Tears:   Dropping Sign at 90 Deg ABD and 45 deg ER: NEG (WAS: R pain)  IR Lag Sign: B neg (WAS:R pain, L pain)    Strength/MMT: (* denotes performed with pain)  Shoulder Elbow Scapular   Flexion: R 5-/5 (WAS: 4-*/5); L 5-/5 (WAS: 4/5)  Abduction: B 5-/5 (WAS:R 4-*/5)  L 5-/5 (WAS:4/5)  ER: R 5-/5; L 5/5  IR: R 5/5; L 5/5 Flexion: R  5/5; L 5/5  Extension: R 5/5; L 5/5  Supination: R 5/5; L 5/5  Pronation: R 5/5; L 5/5  Rhomboids: B 5-/5 (WAS:R 4-/5,* L 4/5)  Mid trap: B 5-/5 (WAS:R 4-/5*; L 4/5)   Lats: B 5/5 (WAS:R 4-/5*, L 4/5)  Low trap: B 5-/5 (WAS:R 4-/5*; L 4/5)       Assessment: Pt has made significant improvement in physical therapy with increased ROM, strength, and function with reaching and lifting. Pt hasn't returned to full work out level and more careful with rotational and endrange overhead activities, but feels confident to continue HEP on her own.   Manual and tactile cueing for maintenance of correct SH rhythm and instructed pt to use mirror for visual biofeedback when doing overhead activities to ensure correct SH rhythm. Pt education: ergonomics with focus on avoiding subacromial impingement positions. All goals met.  Pt motivated to continue HEP.      Goals:   Goals: (to be met in 10 visits) -  Pt will report improved ability to sleep without waking due to shoulder pain-MET   Pt will improve shoulder flexion AROM to >/=160 degrees to be able to reach into overhead cabinets without pain or restriction -MET  Pt will improve shoulder abduction AROM to >170 degrees to improve ability to don deodorant, don/doff shirts, and wash hair -MET  Pt will increase shoulder AROM ER to 85 to be able to reach and fasten seatbelt -MET  Pt will increase shoulder AROM IR to 70 to be able to reach in back pocket, tuck in shirt, and turn steering wheel without pain-MET  Pt will improve shoulder strength throughout to 5-/5 to improve function with lifting and reaching -MET  Pt will demonstrate increased mid/low trap strength to 5-/5 to promote improved shoulder mechanics and stabilization with lifting and reaching -MET  Pt will be independent and compliant with comprehensive HEP to maintain progress achieved in PT -MET    Post QuickDASH Outcome Score  Post Score: 15.91 % (1/7/2025  9:53 AM)    13.64 % improvement      Patient/Family/Caregiver was  advised of these findings, precautions, and treatment options and has agreed to actively participate in planning and for this course of care.    Thank you for your referral. If you have any questions, please contact me at Dept: [phone].    Sincerely,  Electronically signed by therapist: Savi Stoll PT     Physician's certification required:  No  Please co-sign or sign and return this letter via fax as soon as possible to [phone].   I certify the need for these services furnished under this plan of treatment and while under my care.    X___________________________________________________ Date____________________    Certification From: 1/7/2025  To:4/7/2025       Date: 12/17/2024  TX#: 2/10 Date:  12/19/2024                TX#: 3/ Date:        12/31/2024          TX#: 4/ Date:      1/2/2025            TX#: 5/ Date:      1/7/2025           TX#: 6/   TE  Reviewed HEP  Pec stretch 30 sec x2  Sleeper's stretch 30 sec x2  PROM R/L shoulder -distraction with IR R  B shoulder flexion x5 w scap retraction and depession  IR RTB x15 R/L  ER YTB x15 R/L  Upper trap stretch 30 sec R/L  Lev scap stretch 30 sec R/L    Posture ed     TE  Sci fit F/B 3 min ea  Precor-  Scap retraction 10# 2x10    Prceor-  Scap retraction + sh ext 10# x10    Wall wipes w ball x10  Wall push ups w ball x10  Pec stretch 30 sec x2  Sleeper's stretch 30 sec x2  PROM R/L shoulder -distraction with IR R  B shoulder flexion x5 w scap retraction and depession  IR RTB x15 R/L  ER YTB x15 R/L  Upper trap stretch 30 sec R/L  Lev scap stretch 30 sec R/L    Posture ed TE  Sci fit F/B 3 min ea [place] L2    Precor-  Scap retraction 15# 2x10    Precor-  Scap retraction + sh ext 10# x10    Lat pulldown grey t-band x12    B shoulder flexion 1# x10-HEP    B shoulder scaption 1# x10-HEP    SL ER 1# x10-HEP  SL scaption 1# x10-HEP    Ergonomics    Posture education TE  sci fit F/B 3 min ea [place] L3    Precor-  Scap retraction 15# 2x10  ER 10# 2x5 R/L  IR  10# x10 R/L    Lat pulldown grey t-band 2x12    Supine-serratus push 1# x10    Wall pushups w +  X10    Sitting-IR/ER RTB R x10-HEP        Ergonomics-reviewed    Posture education   TE  Progress note     Reviewed and upgraded HEP    Shoulder flexion 1# x10 B    Modified planks x30 sec       Wall slides w RTB x10-HEP    Wall pushups w +  X10-HEP    Sitting-IR/ER RTB R 2x10-HEP    Chicken wing 1# x10    Reviewed exercise program with modifications    Ergonomics-reviewed    Posture education    Sleeper's stretch 20 sec x3    PROM R shoulder   MT  STM upper traps, levator scap, generalized B shoulder mm-focus on posterior teres/insertions    B GH joint AP, PA gr II-III multiple bouts     MT:B upper/middle trap, rhomboid, cervical paraspinals MT  STM upper traps, levator scap, generalized B shoulder mm-focus on posterior teres/insertions    B GH joint AP, PA gr II-III multiple bouts     MT:B upper/middle trap, rhomboid, cervical paraspinals MT  STM R upper traps, levator scap, generalized R shoulder mm-focus on anterior and posterior mm insertions    B GH joint AP, PA gr II-III multiple bouts      MT  STM R upper traps, levator scap, generalized R shoulder mm-focus on anterior and posterior mm insertions, RC mm    B GH joint AP, PA gr II-III multiple bouts  MT  STM R upper traps, levator scap, generalized R shoulder mm-focus on anterior and posterior mm insertions, RC mm. Biceps tendon    B GH joint AP, PA gr II-III multiple bouts    HEP:   - Sleeper Stretch  - 2 x daily - 7 x weekly - 4 sets - 25 hold  - Doorway Pec Stretch at 60 Degrees Abduction with Arm Straight  - 2 x daily - 7 x weekly - 2 sets - 5 reps - 10 hold  - Standing Shoulder Posterior Capsule Stretch  - 2 x daily - 7 x weekly - 3 sets - 15 hold  - Standing Shoulder Flexion to 90 Degrees  - 2 x daily - 7 x weekly - 2 sets - 10 reps  - Shoulder Abduction - Thumbs Up  - 2 x daily - 7 x weekly - 2 sets - 10 reps  - Standing Shoulder Row with Anchored  Resistance  - 2 x daily - 7 x weekly - 2 sets - 8 reps  - Shoulder extension with resistance - Neutral  - 2 x daily - 7 x weekly - 2 sets - 10 reps  - Shoulder Internal Rotation with Resistance  - 2 x daily - 7 x weekly - 2 sets - 10 reps  - Shoulder External Rotation with Anchored Resistance  - 2 x daily - 7 x weekly - 2 sets - 10 reps  - Supine Shoulder Flexion Extension AAROM with Dowel  - 2 x daily - 7 x weekly - 2 sets - 10 reps - 3 hold  - Standing Shoulder Abduction Wall Slide with Thumb Out  - 2 x daily - 7 x weekly - 2 sets - 10 reps  - Seated Gentle Upper Trapezius Stretch  - 2 x daily - 7 x weekly - 3 sets - 30 hold  - Gentle Levator Scapulae Stretch  - 2 x daily - 7 x weekly - 3 sets - 30 hold    Access Code: [email or web address]  URL: [email or web address]  Date: 12/31/2024  Prepared by: Savi Stoll    Exercises  - Standing Shoulder Flexion Full Range  - 1 x daily - 7 x weekly - 1 sets - 10 reps  - Shoulder Scaption at Wall Thumbs Up  - 1 x daily - 7 x weekly - 1 sets - 10 reps  - Sidelying Shoulder External Rotation  - 1 x daily - 7 x weekly - 1 sets - 10 reps  - Sidelying Shoulder Scaption  - 1 x daily - 7 x weekly - 1 sets - 10 reps    Access Code: [email or web address]  URL: [email or web address]  Date: 01/07/2025  Prepared by: Savi Stoll    Exercises  - Seated Shoulder External Rotation in Abduction Supported with Dumbbell  - 1 x daily - 7 x weekly - 1 sets - 10 reps  - Shoulder Flexion Wall Slide with Resistance Band  - 1 x daily - 7 x weekly - 1 sets - 10 reps  - Seated Shoulder Internal Rotation in Abduction Supported with Resistance  - 1 x daily - 7 x weekly - 1 sets - 10 reps  - Wall Push Up with Plus  - 1 x daily - 7 x weekly - 1 sets - 10 reps    Charges: TE2 30 MT 15       Total Timed Treatment: 45 min  Total Treatment Time: 45 min

## 2025-05-07 NOTE — PROGRESS NOTES
HISTORY OF PRESENT ILLNESS  Chief Complaint   Patient presents with    Weight Problem     Dr tejada referral. No weight loss meds in past 3 days weekly exericse  WC 36  Neck 13       Coleman Gastelum is a 49 year old female new to our office today for initiation of medical weight loss program, referred by JOSÉ ANTONIO.  Patient presents today with c/o excess weight over the past 2 years.    Reason/goal for weight loss: lose 10-15# in 6 months and 30# in 1 year.    Previous weight loss efforts in the past: diet and exercise    Past 6 months lifestyle interventions: yes, regular exercise    Reviewed [facility] patient contract. Readiness for Lifestyle change: 10/10, Interest in Medication: 10/10, Bariatric surgery interest: 3/10.    Barriers to weight loss: cravings for sweets/salty, snacking    Wt Readings from Last 6 Encounters:   05/08/25 188 lb (85.3 kg)   09/25/24 175 lb (79.4 kg)   08/06/24 172 lb 12.8 oz (78.4 kg)   07/20/24 165 lb (74.8 kg)   05/08/24 164 lb (74.4 kg)   05/10/24 166 lb 2 oz (75.4 kg)          Social hx and lifestyle reviewed:    How many meals do you eat out per week: 3  Who is the primary cook in your home: patient    Breakfast Lunch Dinner Snacks Fluids   Greek yogurt with 1/4 c. 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 Typically triggered by work.  Sleep hours and integrity: 5-6 hours/night, walking 2x/night, does not always feel rested. +snoring.    MEDICAL HISTORY  PMH reviewed:   Cardiac disorders: negative  Depression/anxiety: negative  Glaucoma: negative  Kidney  stones: yes  Eating disorder: negative  Migraines: yes  Seizures: negative  Joint-related conditions: negative  Liver disease: negative  Renal disease: negative  Diabetes: negative  Thyroid disease: negative  Constipation: negative  Other pertinent hx: n/a  Sleep Apnea hx: negative  Cancer hx: negative  Cholecystectomy and/or gallstones: negative  Family or personal history of Pancreatic issues / Medullary Thyroid Cancer/MENS 2: negative  History of bariatric surgery: negative    FMH reviewed: obesity in parent/s or sibling: yes    REVIEW OF SYSTEMS  GENERAL: feels well otherwise  SKIN: denies any rashes to skin folds  HEENT: snoring- yes  LUNGS: denies shortness of breath with exertion, no apnea  CARDIOVASCULAR: denies chest pain on exertion, denies palpitations or pedal edema  GI: denies abdominal pain.  No N/V/D/C  MUSCULOSKELETAL: denies joint pains  NEURO: denies headaches  PSYCH: denies change in behavior or mood, denies feeling sad or depressed.    EXAM    MBSAQIP Information  Patient type: Non-Surgical   Initial Body Fat %: 37.6  Today's Body Fat Female: 30.44 Change in Body   Fat %: 7.16   Date of Initial Weight: 5/8/25 Initial Weight: 188 Today's Weight: 188   Weightloss to Date: 0   Weightloss Percentage: 0 5% Goal: 9.4 10% Goal: 18.8    Initial BMI: 29.9 Today's BMI: 29.89 Change in BMI: 0.01    Neck Circumference: 13  Waist Circumference Female: 36     Female Fat Mass: 57.23 Female Lean Mass: 130.77      Have any comorbidities improved since the last visit?   Hypertension DM 2 Dyslipidemia Osteoarthritis Sleep Apnea                 /72   Pulse 68   Resp 16   Ht 5' 6.5\" (1.689 m)   Wt 188 lb (85.3 kg)   LMP  (LMP Unknown)   BMI 29.89 kg/m² ,   Percent body fat: F >32%: 37.6%. VF: 9. Muscle Mass: 16.11%, WC: 36 inches.  GENERAL: well developed, well nourished, in no apparent distress, overweight  SKIN: warm, pink, dry without rashes to exposed area  EYES: conjunctiva pink, sclera non icteric,  PERRLA  HEENT: atraumatic, normocephalic, O/p: Mallampati score- 2  NECK: supple, non tender, no adenopathy, no thyromegaly  LUNGS: CTA in all fields, breathing non labored  CARDIO: RRR without murmur, normal S1 and S2 without clicks or gallops, no pedal edema. Reviewed EKG in EMR dated 5/10/24.  GI: +BS, soft, no masses, HSM or tenderness  MUSCULOSKELETAL: grossly intact  NEURO: Oriented times three  PSYCH: pleasant, cooperative, normal mood and affect    Lab Results   Component Value Date    WBC 4.7 04/19/2024    RBC 5.29 04/19/2024    HGB 13.9 04/19/2024    HCT 43.1 04/19/2024    MCV 81.5 04/19/2024    MCH 26.3 04/19/2024    MCHC 32.3 04/19/2024    RDW 13.0 04/19/2024    .0 04/19/2024     Lab Results   Component Value Date    GLU 90 12/20/2023    BUN 23 12/20/2023    BUNCREA 19.8 03/22/2021    CREATSERUM 1.22 (H) 12/20/2023    ANIONGAP 3 12/20/2023    GFR 72 06/26/2017    GFRNAA 68 11/11/2021    GFRAA 78 11/11/2021    CA 9.8 12/20/2023    OSMOCALC 295 12/20/2023    ALKPHO 111 (H) 04/19/2024    AST 27 04/19/2024    ALT 75 (H) 04/19/2024    BILT 0.9 04/19/2024    TP 7.4 04/19/2024    ALB 4.1 04/19/2024    GLOBULIN 3.3 12/20/2023    AGRATIO 1.7 06/10/2014     12/20/2023    K 4.5 12/20/2023     12/20/2023    CO2 28.0 12/20/2023     No results found for: \"EAG\", \"A1C\"  Lab Results   Component Value Date    CHOLEST 150 10/16/2023    TRIG 33 10/16/2023    HDL 74 (H) 10/16/2023    LDL 68 10/16/2023    VLDL 5 10/16/2023    TCHDLRATIO 2.83 06/26/2018    NONHDLC 76 10/16/2023     Lab Results   Component Value Date    TSH 0.878 10/16/2023    TSHT4 0.43 06/10/2014     No results found for: \"B12\", \"VITB12\"  Lab Results   Component Value Date    VITD 33.8 02/22/2021       Medications Ordered Prior to Encounter[1]    ASSESSMENT  Initial Weight Data and Goal Weight Loss:  Weight Calculations  Initial Weight: 188 lbs  Initial Weight Date: 05/08/25  Today's Weight: 188 lbs  5% Goal: 9.4  10% Goal: 18.8  Total  Weight Loss: 0 lbs    Diagnoses and all orders for this visit:    Encounter for therapeutic drug monitoring  -     Vitamin D; Future  -     Vitamin B12; Future  -     TSH and Free T4; Future  -     Lipid Panel; Future  -     Hemoglobin A1C; Future  -     Comp Metabolic Panel (14); Future  -     CBC With Differential With Platelet; Future  -     Tirzepatide-Weight Management (ZEPBOUND) 2.5 MG/0.5ML Subcutaneous Solution Auto-injector; Inject 2.5 mg into the skin once a week.  -     Tirzepatide-Weight Management (ZEPBOUND) 5 MG/0.5ML Subcutaneous Solution Auto-injector; Inject 5 mg into the skin once a week. Start after completing full 4 weeks on Zepbound 2.5 mg weekly dose.    Overweight (BMI 25.0-29.9)  - Start Zepbound as directed.  - Reviewed balanced plate nutrition with focus on whole food, regular meals daily that include protein and produce and eliminating/reducing late night eating.  - Counseled on the 4 Pillars of health (sleep, stress, nutrition and fitness).  - Reviewed weight synopsis in EMR  - Instructed to use contraception at all times while on AOMs.  -     Vitamin D; Future  -     Vitamin B12; Future  -     TSH and Free T4; Future  -     Lipid Panel; Future  -     Hemoglobin A1C; Future  -     Comp Metabolic Panel (14); Future  -     CBC With Differential With Platelet; Future  -     OP REFERRAL TO DIETITIAN [facility] ([facility] USE ONLY)  -     Tirzepatide-Weight Management (ZEPBOUND) 2.5 MG/0.5ML Subcutaneous Solution Auto-injector; Inject 2.5 mg into the skin once a week.  -     Tirzepatide-Weight Management (ZEPBOUND) 5 MG/0.5ML Subcutaneous Solution Auto-injector; Inject 5 mg into the skin once a week. Start after completing full 4 weeks on Zepbound 2.5 mg weekly dose.    History of obesity  Comments:  Baseline BMI: 34.59 (6/11/2019)  Orders:  -     Vitamin D; Future  -     Vitamin B12; Future  -     TSH and Free T4; Future  -     Lipid Panel; Future  -     Hemoglobin A1C; Future  -     Comp Metabolic  Panel (14); Future  -     CBC With Differential With Platelet; Future  -     OP REFERRAL TO DIETITIAN [facility] ([facility] USE ONLY)  -     Tirzepatide-Weight Management (ZEPBOUND) 2.5 MG/0.5ML Subcutaneous Solution Auto-injector; Inject 2.5 mg into the skin once a week.  -     Tirzepatide-Weight Management (ZEPBOUND) 5 MG/0.5ML Subcutaneous Solution Auto-injector; Inject 5 mg into the skin once a week. Start after completing full 4 weeks on Zepbound 2.5 mg weekly dose.    Weight gain  -     Vitamin D; Future  -     Vitamin B12; Future  -     TSH and Free T4; Future  -     Lipid Panel; Future  -     Hemoglobin A1C; Future  -     Comp Metabolic Panel (14); Future  -     CBC With Differential With Platelet; Future  -     OP REFERRAL TO DIETITIAN [facility] ([facility] USE ONLY)  -     Tirzepatide-Weight Management (ZEPBOUND) 2.5 MG/0.5ML Subcutaneous Solution Auto-injector; Inject 2.5 mg into the skin once a week.  -     Tirzepatide-Weight Management (ZEPBOUND) 5 MG/0.5ML Subcutaneous Solution Auto-injector; Inject 5 mg into the skin once a week. Start after completing full 4 weeks on Zepbound 2.5 mg weekly dose.        PLAN  Medication use and side effects reviewed with patient.  Medication contraindications: none foreseen  Follow up with dietitian and psychologist as recommended.  Discussed the role of sleep and stress in weight management.  Labs orders as above.  Counseled on comprehensive weight loss plan including attention to nutrition, exercise and behavior/stress management for success. See patient instruction below for more details.  Reviewed previous labs in EMR/Care Everywhere  Weight Loss Contract reviewed and signed.    Patient Instructions   Welcome to the [facility] Program...your Lifestyle Renovation begins now!  Thank you for placing your trust in our health care team, I look forward to working with you along this journey to better health!    Next steps:     1.  Call our office at [phone] to  schedule a personal nutrition consultation with one of our registered dieticians, Terence Pinon. Bring along your food journal (3 days minimum). See journal options below.  2.  Complete fasting (10-12 hours, water only) labs at [facility] lab site prior to next office visit. Lab results will be communicated via [employer].  3.  Body composition completed today with findings of: Total body fat: 37.6% (goal < 32%), Visceral Fat: 9 (goal <10), Muscle mass: 16.11% (goal >18%), and waist circumference 36 inches (goal <35 inches).  4.  Use contraception at all times while on anti-obesity medications.  5.  Look into your employer potential requirement for [employer] program to receive covered benefit for anti-obesity medications (AOMs).  5.  Fill your prescribed medication and take as discussed and prescribed: Start Zepbound at 2.5 mg weekly. After 4 weeks increase to the next dose of 5 mg weekly. If at a weight plateau for >3 weeks, then send [employer] message with current scale weight to determine if a dose adjustment is appropriate. Otherwise plan to maintain this dose until next visit. Any further dose titrations beyond this dose will be considered at appointments only, unless otherwise discussed. Visit the website [email or web address] and click on Consumers for additional details, savings, and further dosing instructions. This medication may require a prior authorization (PA) by your insurance. A PA may take one week plus to complete and our office will be in touch during this process if needed. If cost/supply prohibitive plan: generic alternative to Qsymia (www.qsymia.com) with Phentermine and Topamax.    Tips while taking an injectable medication:    Be an intuitive eater. Listen to your hunger and fullness signals, stopping when you are full.  Consume protein and produce in your day, striving for a rainbow of color of produce.  Reduce portions to starting size of 1 cup and check in with your gut to see if you are  full. Use a sand timer to slow down your eating pace to allow for 15-20 minutes to complete a meal and use the \"2 bite rule\".  Reduce refined sugars and high fat foods, as they may contribute to greater side effects of nausea and heartburn.  Stop eating 3 hours before bedtime to allow your food to digest.  Remain hydrated with water or non caloric and non caffeine beverages.  Use over the counter jaja lozenge/supplement to help reduce nausea if needed.  If you have been off your medication for more than 2 weeks please notify our office to determine next dosing, as a return to previous dose may not be appropriate or tolerated.  Zepbound can be kept at room temperature up for 3 weeks.      Please try to work on the following dietary changes this first month:    1.  Drink water with meals and throughout the day, cut down on soda and/or juice if consumed. Consider flavored water options like Bubbly, Spindrift, Hint and Cedrick. Reduce alcohol servings to 4 per week maximum.  2.  Have protein with each meal, examples include: greek yogurt, cottage cheese, hard boiled egg, tofu, chicken, fish, or tuna. Recommended daily protein intake is 100 grams/day.  3.  Work towards reducing/eliminating refined carbohydrates and sugars which includes items such as sweets, as well as rice, pasta, and bread and make sure to choose whole grain options when having them with just 1 serving per meal about the size of your inner palm.  4.  Consume non starchy veggies daily working towards making them a good 50% of your daily food intake. Add them to lunch and dinner consistently.  5.  Start a daily probiotic: VSL#3 is recommended, (order on line at www.vsl3.com). Take 1 capsule daily with water for 30 days, then reduce to 1 every other day (this will reduce the cost). Capsules can be left out of refrigerator for 2 weeks. I recommend using a pill box weekly and keeping the bottle in the fridge.    Please download evy My Fitness Pal, LoseIt!  Or My Net Diary to monitor daily dietary intake and you will be able to see if you are eating the right amount of calories or too much or too little which would hinder weight loss. Additionally this will help to see your daily carbohydrate and protein intake. When you set the oxana up choose 1.5 lbs/week as a goal.  Keeping a paper food journal is an option as well to remain accountable for your choices- this is the start to mindful eating! A low calorie diet has been consistently shown to support weight loss.    Continue or start exercising to help establish a routine. If not already exercising begin with 1 day/week and progress as able with the goal of working towards 30 minutes 5 days a week at a minimum. A variety or cardio, strength and stretching is important. Review resources below to help support you in building this healthy routine.    Meditation daily can help manage and control stress. Chronic stress can make weight loss difficult.  Exercising is one way to help with stress, but meditation using the CALM Oxana or another comparable alternative can be done in your home or place of work with little time commitment. This Oxana can also help work on behavior change and improve sleep. Check out the segment under Calm Masterclass and listen to The 4 Pillars of Health. A great way to begin learning about the foundation of lifestyle with practical tips to use in your every day. In addition, we offer counseling services and support for individual connection and care. A referral is necessary so please let me know if this is a service you are interested.    Check out www.yourweightmatters.org blog for continued support and education along your weight loss journey to optimal health!      Patient Resources:    Personal Training/Fitness Classes/Health Coaching    [facility] in [place]: Full fitness center with group fitness and personal training located in [place].  Health Coaching with Latha Greer  Ismael Wallace, and Bala Mcgrath at our [facility]- individual coaching to work on your health goals. Call [phone] and/or email @ [email or web address]. Free 60 minute consult when client of [employer] [facility].  DainFIT [place] @ http://www.TouchOfModern. A variety of group fitness options plus various yoga classes [phone] and/or email Alexandra at [email or web address]  [facility] Fitness Centers with multiple locations: SavedPlus Inc (www.Spanlink Communications), RallyCause5 Training (www.Meilapp.com), Skimo TV Body BootPycnop (www.Advanced Manufacturing Control Systems), Yella Rewards (www.Bosse Tools), The Exercise  (www.BestTravelWebsitesach.com), Club Pilates (www.RentWiki.StartSampling)    Online Fitness  Fitness  on Landscape Mobile  Fit in 10 DVD series   www.Longevity Biotech  Chair exercises via Sit and Be Fit (www.sitandObihai Technology.Jambotech) and Yummy77 (www.OnState) or Shane Pedraza or Will Azevedo videos on YouTube.  Hip Hop Fit with Phani Jeong at www.hiphopWindPole Ventures.Prevently    Apps for on the Go Fitness  [place] 7 Minute Workout (orange box with white 7) - free on the go HIIT training oxana  Peloton Oxana @ www.onepeloton.com    Nutrition Trackers, Meal Preparation, and Other Meal Programs  LoseIT! And My Fitness Pal apps and on line for tracking nutrition  NOOM - virtual health coaching  FitFoundation (healthy meals on the go) in [place] @ www.ekwffizhxzizq2b.StartSampling  Jonathon MONIQUE @ www.bistromd.StartSampling and Fsiyta64 (calorie smart and low carb plans recommended) @ www.bkfhjf13.com, Metabolic Meals @ www.MyMetabolicMeals.com - individual prepared meals to go  Gobble, Blue Apron, Home , Every Plate, Sunbasket- on line meal delivery programs for preparation at home  Meal Village in [place] for homemade meals to go @ www.mealvillage.StartSampling  Diet Doctor @ www.dietdoctor.com - low carb swaps  ReciMe and Mealime oxana (grocery and meal planning)    Stress, Anxiety, Depression, Trauma  CALM meditation oxana  (www.calm.com)  Headspace  Don't let anxiety run your life. Using the science of emotion regulation and mindfulness to overcome fear and worry by Victor Manuel Khan PsyD and Tyler Villar MA.  The Fashionchick Podcast (September 27, 2023): 6 Magic Words That Stop Anxiety  What Happened to You?- a look at the impact trauma has on behavior written by Sheyla Macedo and Dr. Niels Patel  Whole Again by Dong Templeton - discovering your true self after trauma    Mindful Eating/The Hungry Brain  Am I Hungry? Mindful eating virtual  evy (www.amihungry.com)  The Hungry Brain by Tara Guadalupe, PhD  Mindless Eating by Marques Jones  Weight Loss Surgery Will Not Treat Food Addiction by Ifeoma Quezada Ph.D    Metabolic Dysfunction, Hormones and Cravings  Why We Get Sick? By Mello Newman (insulin resistance)  Your Body in Balance: The New Science of Food, Hormones, and Health by Dr. Yfn Romero  The Complete Guide to fasting by Dr. Man  Fast Like a Girl by Dr. Maddy Bruno  The M Factor (documentary on PBS about Menopause)  Sugar, Salt & Fat by Sonja Gramajo, Ph.D, R.D.  The Truth About Sugar - documentary on sugar (Free on [employer], https://youRenewDatau.be/2F4ezdjXI9n)  Presentation on SUGAR called Sugar: The Bitter Truth by Dr. Sanjay Kuo ([employer]und[employer]els) https://youtu.be/dBnniua6-oM?si=[email or web address]  Reverse Visceral Fat: #1 Way to Increase Your Lifespan & End Inflammation with Dr. Ren Sin on Utube @ https://youRenewDatau.be/nupPRnvUpJY?si=yz5xazWfBQY5SsdJ    Nutrition Support  You Are What You Eat - Netfix series on twin study looking at impact of nutrition changes on health  The End of Dieting: How to Live for Life by Dr. Del Hall M.D. or listen to The xLander.ru Podcast Episode 63: Understanding \"Nutritarian\" Eating w/Dr. Del Hall  The Game Changers- Netflix Documentary on plant based nutrition  The Dr. Julian T5 Wellness Plan by Dr. Scotty Julian MD  The Complete Guide to fasting by Dr. Man  @[facility]  ([facility] Dietician with support surrounding nutrition and meal prep/planning)    Education, Motivation and Support Resources  Live to 100: Secrets of the Blue Zones - Netflix series on the secrets to communities living over 100 years old  Atomic Habits by Jan Nunez (a book about taking small steps to promote greater behavior change)   Motivation evy (black box with white \")- daily supportive messages sent to your phone  Can't Hurt Me by Victor Manuel Gutierrez (a book exploring the power of discipline in achieving your goals)  Fed Up - documentary about obesity (Free on Utube)  Www.yourweightmatters.org - Obesity Action Coalition sponsored Blog posts  Obesity Action Coalition Resources on topics specific to weight management (www.obesityaction.org)  Fitlosophy Fitspiration - journal to better health (journal book found at [employer] in fitness aisle)  Prashant Easton talk titled: The Call to Courage (Netflix)  The Exam Room by the Physician's Committee (Podcast)  Nutrition Facts by Dr. Gurerier (Podcast)    Perimenopause/Menopause and Obesity    The prevalence of obesity, which is closely associated with cardiovascular risk, increases significantly in American women after they reach age 40; the prevalence reaches 65% between 40 and 59 years, and 73.8% in women over age 60.    According to the Healthy Women Study, the average weight gain in perimenopausal women was about five pounds; however, 20% of the population they studied gained 10 pounds or more. Not only is the weight increase from a drop in estrogen but it’s also because of a decrease in energy expenditure. Some women may notice an overall weight gain, whereas others may not see a difference on the scale but may notice that their pants aren’t buttoning as easily. Both are surprising to many women because they may not notice a difference in their dietary intake or activity.    The reasons for increasing obesity in menopausal women are not clear. Some researchers argue that  the absence of estrogens may be an important obesity-triggering factor. Estrogen deficiency enhances metabolic dysfunction predisposing to type 2 diabetes mellitus, the metabolic syndrome, and cardiovascular diseases. Estrogen plays a vital role in fat storage and distribution. Before perimenopause, estrogen deposits fat in your thighs, hips, and buttocks. During and after menopause, the drop in estrogen leads to an overall increase in total body fat but now more so in your midsection. Studies have consistently shown that this waistline increase is different from when you were younger. An increase in visceral (abdominal) fat is linked to an increase in insulin resistance, diabetes, and inflammatory diseases.    Another factor contributing to weight gain in perimenopause may be the increased appetite and calorie intake that occurs in response to hormone changes. In one study, levels of the “hunger hormone” ghrelin were found to be significantly higher in perimenopausal women compared with premenopausal and postmenopausal women.    The low estrogen levels in the late stages of menopause may also impair the function of leptin and neuropeptide Y, hormones that control fullness and appetite. Therefore, women in the late stages of perimenopause who have low estrogen levels may be driven to eat more calories and store fat.    Make weight gain a modifiable risk factor  So, you may be thinking--I’m destined for failure! But this isn’t true. Although the risk of weight gain as a middle-aged woman is higher, this does not mean that it is required. It does mean that we may have to work a little harder to prevent this from happening. It is important to keep in mind that many of the health risks found in the menopause transition are also affected by weight. If we are able to keep a healthy weight, or at least minimize any weight gain, then we are likely to minimize these additional health risks. Now that you know the risks, here  are some ways to stay healthy during this midlife transition and avoid a midlife crisis:    Reduce calories. During menopause, our energy expenditure decreases even if our activity level and nutrient intake stays the same. This is secondary to the hormone changes with menopause as well as the natural muscle loss that is occurring. We need about 200 fewer calories in our 50s than we did in our 30s and 40s. This means that we’ve got to move more and eat less to keep our healthy weight. To help decrease portion sizes, try splitting your meals with a friend, ordering the lighter portion when available, or put half in the takeout box right away. Swap out dessert for fruit or yogurt.   Reduce carbs. Cut back on carbs in order to reduce the increase in belly fat, which drives metabolic problems   Add fiber. Eat a high-fiber diet that includes flaxseeds, which may improve insulin sensitivity.   Work out. Engage in strength training to improve body composition, increase strength, and build and maintain lean muscle. The American Heart Association recommends 150 minutes of moderate exercise per week. Add ANY activity to your day. Strength and resistance training help maintain bone mass. This will help to prevent osteoporosis, which is bone loss that can lead to easy fractures.   Rest and relax. Try to relax before bed and get enough sleep in order to keep your hormones and appetite under control.   Get support and learn to cope without food. Many women (and men) admit to eating under stress. And, let’s face it, middle age can bring some tough times. Children are often departing from the home, and some are returning. Your parents now need more help and guidance. This can be disruptive to our everyday lives. Focus on using nonfood stress relievers. Try going for a walk, deep breathing, or scheduling some “me” time with your favorite book to unwind. Seek out support from friends and loved ones who may have gone through a similar  situation.    Taken from The [employer]  Alesha Mckeon MD, [facility] [place], [facility]      What are proteins?  Proteins are one of three primary macronutrients that provide energy to the human body, along with fats and carbohydrates. Proteins are also responsible for a large portion of the work that is done in cells; they are necessary for proper structure and function of tissues and organs, and also act to regulate them. They are comprised of a number of amino acids that are essential to proper body function, and serve as the building blocks of body tissue.  There are 20 different amino acids in total, and the sequence of amino acids determines a protein's structure and function. While some amino acids can be synthesized in the body, there are 9 amino acids that humans can only obtain from dietary sources (insufficient amounts of which may sometimes result in death), termed essential amino acids. Foods that provide all of the essential amino acids are called complete protein sources, and include both animal (meat, dairy, eggs, fish) as well as plant-based sources (soy, quinoa, buckwheat).    Proteins can be categorized based on the function they provide to the body. Below is a list of some types of proteins:  Antibody--proteins that protect the body from foreign particles, such as viruses and bacteria, by binding to them  Enzyme--proteins that help form new molecules as well as perform the many chemical reactions that occur throughout the body  Messenger--proteins that transmit signals throughout the body to maintain body processes  Structural component--proteins that act as building blocks for cells that ultimately allow the body to move  Transport/storage--proteins that move molecules throughout the body  As can be seen, proteins have many important roles throughout the body, and as such, it is important to provide sufficient nutrition to the body to maintain healthy protein levels.    How much  protein do I need?  The amount of protein that the human body requires daily is dependent on many conditions, including overall energy intake, growth of the individual, and physical activity level. It is often estimated based on body weight, as a percentage of total caloric intake (10-35%), or based on age alone. 0.8g/kg of body weight is a commonly cited recommended dietary allowance (RDA). This value is the minimum recommended value to maintain basic nutritional requirements, but consuming more protein, up to a certain point, maybe beneficial, depending on the sources of the protein.  The recommended range of protein intake is between 0.8 g/kg and 1.8 g/kg of body weight, dependent on the many factors listed above. People who are highly active, or who wish to build more muscle should generally consume more protein. Some sources suggest consuming between 1.8 to 2 g/kg for those who are highly active. The amount of protein a person should consume, to date, is not an exact science, and each individual should consult a specialist, be it a dietitian, doctor, or , to help determine their individual needs. I recommend your daily protein intake to be 100 grams/day.    Foods high in protein  There are many different combinations of food that a person can eat to meet their protein intake requirements. For many people, a large portion of protein intake comes from meat and dairy, though it is possible to get enough protein while meeting certain dietary restrictions you might have. Generally, it is easier to meet your RDA of protein by consuming meat and dairy, but an excess of either can have a negative health impact. There are plenty of plant-based protein options, but they generally contain less protein in a given serving. Ideally, a person should consume a mixture of meat, dairy, and plant-based foods in order to meet their RDA and have a balanced diet replete with nutrients.    If possible, consuming a  variety of complete proteins is recommended. A complete protein is a protein that contains a good amount of each of the nine essential amino acids required in the human diet. Examples of complete protein foods or meals include:    Meat/Dairy examples  Eggs  Chicken breast  Cottage cheese  Greek yogurt  Milk  Lean beef  Tuna  Turkey breast  Fish  Shrimp    Vegan/plant-based examples  Buckwheat  Hummus and phi  Soy products (tofu, tempeh, edamame beans)  Peanut butter on toast or some other bread  Beans and rice  Quinoa  Hemp and yrn seeds  Spirulina  Generally, meat, poultry, fish, eggs, and dairy products are complete protein sources. Nuts and seeds, legumes, grains, and vegetables, among other things, are usually incomplete proteins. There is nothing wrong with incomplete proteins however, and there are many healthy, high protein foods that are incomplete proteins. As long as you consume a sufficient variety of incomplete proteins to get all the required amino acids, it is not necessary to specifically eat complete protein foods. In fact, certain high fat red meats for example, a common source of complete proteins, can be unhealthy.   Below are some examples of high protein foods that are not complete proteins:  Almonds  Oats   Broccoli  Lentils  Akin bread  Yrn seeds  Pumpkin seeds  Peanuts  [place] sprouts  Grapefruit  Green peas  Avocados  Mushrooms  As can be seen, there are many different foods a person can consume to meet their RDA of protein. The examples provided above do not constitute an exhaustive list of high protein or complete protein foods. As with everything else, balance is important, and the examples provided above are an attempt at providing a list of healthier protein options (when consumed in moderation).    Amount of protein in common food      Protein Amount  Milk (1 cup/8 oz)  8 g  Egg (1 large/50 g)  6 g  Meat (1 slice / 2 oz)  14 g  Seafood (2 oz)  16 g  Bread (1 slice/64 g)   8  g  Corn (1 cup/166 g)  16 g  Rice (1 cup/195 g)  5 g  Dry Bean (1 cup/92 g)   16 g  Nuts (1 cup/92 g)    20 g  Fruits and Veggie (1 cup)  1 g    Data above taken from www.calculator.net    The Power of Protein:    High Protein Foods:  FISH  (3-6 ounces/meal)  All types of fish  Seafood (shrimp, scallops, clams, mussels, lobster)  EGGS     2-3 eggs/meal  DAIRY (2/3 to 1 ½ cup)  Cottage cheese   Greek yogurt  PLANTS (½-3/4 cup/meal)  Legumes: Dried beans and peas (black beans, forbes beans, garbanzo beans, kidney, cannellini, navy, split peas, black eyed peas)  Lentils  Quinoa  Soy (edamame, tofu)  PORK   (3-6 oz/meal)  Tenderloin  Pork chop  Top loin roast, boneless  Sirloin roast, boneless  [place] tejada (nitrate free)  Boiled deli ham (nitrate free)    Additional Protein Sources:  BEEF    (3-6 oz/meal)  Flank steak       Skirt steak  Bottom round(rump roast), select    Ground beef, 90% lean (ground sirloin)  Aniceto eye steak, choice  Eye of round roast, choice   POULTRY  (3-6 oz/meal)  Ground chicken or turkey  Chicken, no skin  Turkey, no skin  PROTEIN SHAKES: OWYN, Koia, and Rebbl (plant based and dairy free), Corepower by Lemonwise, Satish (plant based option available), Premier Protein, JuicePlus Complete (www.juiceplus.com)  PROTEIN BARS: RXBAR, PowerCrunch, Quest, Barebells, Mosh  SNACK/ON-the-GO OPTIONS: Chomps Meatstick, Oats Overnight (www.oatsovernStrong Arm Technologies.[employer] Network), [place] brand protein granola      Comfort Foods - Why do they make us happy?    by Kristy Bell, PhD, HSPP    OAC @ www.obesityaction.org Summer 2013 Resources    “There, there. Just let me bake you some cookies.” “Don’t cry. We’ll stop and get some ice cream, and it will all be better.”    Do these sound familiar? Maybe statements that you heard as a young child? They were innocent words and very genuine actions on the part of our caregivers to express love and concern to us when we were hurting. The way they knew to do this was through food - and  not just any food. Usually, the foods offered were foods rich in fats and carbohydrates - the foods that we have come to term “comfort foods.” In this way, food has come to be used as a special type of medicine, as an anti-depressant of types, to cure the mood that ails us. However, such patterns can become very problematic, especially if it is a habitual pattern causing excessive weight gain.    Scientists continue to research the effect that the chemical composition of foods may have on our moods. While such research is very valuable, I want to focus on the psychology, not the biology, of comfort food.    Food and Customs  All cultures have customs around food. In my childhood, money was scarce and when there was some kind of special occasion, it meant that we would use limited resources to buy special foods. It meant that we were being treated in a special way. Birthdays meant choosing a special meal and a type of cake and ice cream (within a budget). Funerals involved having food brought to the bereaved. The  ritual is one of special interest to this topic. The message is quite obvious: “I hope this food makes you happier.” Again, nothing is ill-intended and the gift is given with much love and care. However, it is another reinforcement of the use of food to make us “feel better.”    We are given messages early in our lives and then reinforced throughout our lives about how food can make us feel different, to feel better. Because we equate food with happiness, we continue to turn to food for such comfort. And we do feel happy or better, albeit temporarily.    Changing Patterns  1. The key to changing this lifelong pattern of equating food with happiness is to first be aware. Take some time to reflect on how food was used through your life and its connection to emotional states for you.    2. Next, take some time to reflect on your own emotional states. You may keep a feeling journal and write down how you  felt each day. In reflecting, you will be more aware of the connection of food to your feelings in the past and more aware of your feelings in the present.    3. Then, the work begins. Take each emotion connected to food and create a list of other things you may do to tend to that emotion. For instance, you may have “sadness” as one emotion that has been connected to eating. Alternative ways to get comfort when sad may be:    Talk to a friend  Cry  Journal your feelings  Listen to music  Write a song or a poem    4. By creating alternatives, you begin to see how you can break the cycle of comfort eating.    5. Post this list of alternatives in a place that you are likely to see it regularly. Consult it. Add to it as needed. Or blanca things off that you have tried that maybe didn’t work.    Food and Behavioral Conditioning  One important means of understanding the connection between food and behavior is understanding how we are conditioned to have a certain response when we are exposed repeatedly to a stimuli. In this case, when we have been told repeatedly that we can feel better with food (the stimuli), we believe that we do indeed feel better (the response) when we eat cakes and cookies and such. However, what we don’t think about is the other stimulus, the care, the concern, the love that came with the food and how that made us feel. In other words, we may have attributed our response (feeling better) to the wrong stimulus (food) rather than the one that actually did make us feel better, which was the love we felt.    So maybe it is not the food that makes us feel happy. Maybe it is the memory of these people expressing their love and care to us. Maybe that is what really makes us happy. Patients often tell me that they eat when sad, lonely or bored. They are seeking comfort.    They want to feel “full” or “satisfied” and the food does offer that physical release. However, the true comfort that they seek cannot be  found in carbohydrates or fat, but it can be found in the feeling of belonging, of connecting with others, of being creative and inspired.    Conclusion  Remember, you have had a lifetime of creating a pattern of using comfort foods, so it is not likely to change quickly. Make sure to give yourself some time to make these changes. When you are able to change the relationship with food, you are able to change your relationships with others, and you just might find more satisfying and healthier relationships.      Return in about 4 months (around 9/8/2025) for weight management via clinic or Telemedicine Visit and in clinic in December.    Patient verbalizes understanding.    Jeannie Tyesha, APRN  5/7/2025    DOCUMENTATION OF TIME SPENT: Code selection for this visit was based on time spent : 60 minutes on date of service in preparing to see the patient, obtaining and/or reviewing separately obtained history, performing a medically appropriate examination, counseling and educating the patient/family/caregiver, ordering medications or testing, referring and communicating with other healthcare providers, documenting clinical information in the electronic medical record, independently interpreting results and communicating results to the patient/family/caregiver and care coordination with the patient's other providers.         [1]   No current outpatient medications on file prior to visit.     No current facility-administered medications on file prior to visit.

## 2025-05-08 ENCOUNTER — OFFICE VISIT (OUTPATIENT)
Dept: INTERNAL MEDICINE CLINIC | Facility: CLINIC | Age: 50
End: 2025-05-08
Payer: COMMERCIAL

## 2025-05-08 VITALS
SYSTOLIC BLOOD PRESSURE: 126 MMHG | DIASTOLIC BLOOD PRESSURE: 72 MMHG | RESPIRATION RATE: 16 BRPM | HEART RATE: 68 BPM | BODY MASS INDEX: 29.86 KG/M2 | WEIGHT: 188 LBS | HEIGHT: 66.5 IN

## 2025-05-08 DIAGNOSIS — E66.3 OVERWEIGHT (BMI 25.0-29.9): ICD-10-CM

## 2025-05-08 DIAGNOSIS — Z86.39 HISTORY OF OBESITY: ICD-10-CM

## 2025-05-08 DIAGNOSIS — Z51.81 ENCOUNTER FOR THERAPEUTIC DRUG MONITORING: Primary | ICD-10-CM

## 2025-05-08 DIAGNOSIS — R63.5 WEIGHT GAIN: ICD-10-CM

## 2025-05-08 RX ORDER — TIRZEPATIDE 2.5 MG/.5ML
2.5 INJECTION, SOLUTION SUBCUTANEOUS WEEKLY
Qty: 2 ML | Refills: 0 | Status: SHIPPED | OUTPATIENT
Start: 2025-05-08

## 2025-05-08 RX ORDER — TIRZEPATIDE 5 MG/.5ML
5 INJECTION, SOLUTION SUBCUTANEOUS WEEKLY
Qty: 2 ML | Refills: 3 | Status: SHIPPED | OUTPATIENT
Start: 2025-05-08

## 2025-05-10 NOTE — PATIENT INSTRUCTIONS
Welcome to the [facility]...your Lifestyle Renovation begins now!  Thank you for placing your trust in our health care team, I look forward to working with you along this journey to better health!    Next steps:     1.  Call our office at [phone] to schedule a personal nutrition consultation with one of our registered dieticians, Terence Pinon. Bring along your food journal (3 days minimum). See journal options below.  2.  Complete fasting (10-12 hours, water only) labs at [facility] lab site prior to next office visit. Lab results will be communicated via [facility].  3.  Body composition completed today with findings of: Total body fat: 37.6% (goal < 32%), Visceral Fat: 9 (goal <10), Muscle mass: 16.11% (goal >18%), and waist circumference 36 inches (goal <35 inches).  4.  Use contraception at all times while on anti-obesity medications.  5.  Look into your employer potential requirement for [facility] program to receive covered benefit for anti-obesity medications (AOMs).  5.  Fill your prescribed medication and take as discussed and prescribed: Start Zepbound at 2.5 mg weekly. After 4 weeks increase to the next dose of 5 mg weekly. If at a weight plateau for >3 weeks, then send [facility] message with current scale weight to determine if a dose adjustment is appropriate. Otherwise plan to maintain this dose until next visit. Any further dose titrations beyond this dose will be considered at appointments only, unless otherwise discussed. Visit the website [email or web address] Technology and click on Consumers for additional details, savings, and further dosing instructions. This medication may require a prior authorization (PA) by your insurance. A PA may take one week plus to complete and our office will be in touch during this process if needed. If cost/supply prohibitive plan: generic alternative to Qsymia (www.qsymia.com) with Phentermine and Topamax.    Tips while taking an injectable  medication:    Be an intuitive eater. Listen to your hunger and fullness signals, stopping when you are full.  Consume protein and produce in your day, striving for a rainbow of color of produce.  Reduce portions to starting size of 1 cup and check in with your gut to see if you are full. Use a sand timer to slow down your eating pace to allow for 15-20 minutes to complete a meal and use the \"2 bite rule\".  Reduce refined sugars and high fat foods, as they may contribute to greater side effects of nausea and heartburn.  Stop eating 3 hours before bedtime to allow your food to digest.  Remain hydrated with water or non caloric and non caffeine beverages.  Use over the counter jaja lozenge/supplement to help reduce nausea if needed.  If you have been off your medication for more than 2 weeks please notify our office to determine next dosing, as a return to previous dose may not be appropriate or tolerated.  Zepbound can be kept at room temperature up for 3 weeks.      Please try to work on the following dietary changes this first month:    1.  Drink water with meals and throughout the day, cut down on soda and/or juice if consumed. Consider flavored water options like Bubbly, Spindrift, Hint and Cedrick. Reduce alcohol servings to 4 per week maximum.  2.  Have protein with each meal, examples include: greek yogurt, cottage cheese, hard boiled egg, tofu, chicken, fish, or tuna. Recommended daily protein intake is 100 grams/day.  3.  Work towards reducing/eliminating refined carbohydrates and sugars which includes items such as sweets, as well as rice, pasta, and bread and make sure to choose whole grain options when having them with just 1 serving per meal about the size of your inner palm.  4.  Consume non starchy veggies daily working towards making them a good 50% of your daily food intake. Add them to lunch and dinner consistently.  5.  Start a daily probiotic: VSL#3 is recommended, (order on line at  www.Snipd.com). Take 1 capsule daily with water for 30 days, then reduce to 1 every other day (this will reduce the cost). Capsules can be left out of refrigerator for 2 weeks. I recommend using a pill box weekly and keeping the bottle in the fridge.    Please download oxana My Fitness Pal, LoseIt! Or My Net Diary to monitor daily dietary intake and you will be able to see if you are eating the right amount of calories or too much or too little which would hinder weight loss. Additionally this will help to see your daily carbohydrate and protein intake. When you set the oxana up choose 1.5 lbs/week as a goal.  Keeping a paper food journal is an option as well to remain accountable for your choices- this is the start to mindful eating! A low calorie diet has been consistently shown to support weight loss.    Continue or start exercising to help establish a routine. If not already exercising begin with 1 day/week and progress as able with the goal of working towards 30 minutes 5 days a week at a minimum. A variety or cardio, strength and stretching is important. Review resources below to help support you in building this healthy routine.    Meditation daily can help manage and control stress. Chronic stress can make weight loss difficult.  Exercising is one way to help with stress, but meditation using the CALM Oxana or another comparable alternative can be done in your home or place of work with little time commitment. This Oxana can also help work on behavior change and improve sleep. Check out the segment under Calm Masterclass and listen to The 4 Pillars of Health. A great way to begin learning about the foundation of lifestyle with practical tips to use in your every day. In addition, we offer counseling services and support for individual connection and care. A referral is necessary so please let me know if this is a service you are interested.    Check out www.yourweightmatters.org blog for continued support and  education along your weight loss journey to optimal health!      Patient Resources:    Personal Training/Fitness Classes/Health Coaching    [facility] in [place]: Full fitness center with group fitness and personal training located in [place].  Health Coaching with Latha Greer, Ismael Wallace, and Bala Mcgrath at our [facility]- individual coaching to work on your health goals. Call [phone] and/or email @ [email or web address]. Free 60 minute consult when client of [facility].  360FIT [place] @ http://www.Notizza. A variety of group fitness options plus various yoga classes [phone] and/or email Alexandra at [email or web address] Twitter Dashboard  [facility] Fitness Centers with multiple locations: Molecular Imprints (www.Thompson Aerospace), Appconomy5 Training (www.QA on Request), BioTrove Body Bootcamp (www.Descargas Online.[employer]iers [employer]), The Industry's Alternative (www.Ivera Medical), The Exercise  (www.iCIMScoach.com), Club PilBrain Sentry (www.SensioLabs.[employer])    Online Fitness  Fitness  on Lozo  Fit in 10 DVD series   www.Giftly  Chair exercises via Sit and Be Fit (www.sitandbefit.org) and Molecular Biometrics (www.Aconex.com) or Shane Pedraza or Will Azevedo videos on YouTube.  Hip Hop Fit with Phani Jeong at www.hiphopfit.Admetric    Apps for on the Go Fitness  Ethel 7 Minute Workout (orange box with white 7) - free on the go HIIT training oxana  Peloton Oxana @ www.onepeloton.com    Nutrition Trackers, Meal Preparation, and Other Meal Programs  LoseIT! And My Fitness Pal apps and on line for tracking nutrition  NOOM - virtual health coaching  FitFoundation (healthy meals on the go) in [place] @ www.hrioxhjaaqyws8j.[employer]iers Corp  Jonathon MONIQUE @ www.bistromd.[employer] and Hlguot09 (calorie smart and low carb plans recommended) @ www.wnzovz00.com, Metabolic Meals @ www.MyMetabolicMeals.com - individual prepared meals to go  Gobble, Blue Apron, Home , Every Plate,  Chelo- on line meal delivery programs for preparation at home  Meal Village in [place] for homemade meals to go @ www.mealAntriaBio.VC4Africa  Diet Doctor @ www.dietdoctor.com - low carb swaps  ReciMe and Mealime evy (grocery and meal planning)    Stress, Anxiety, Depression, Trauma  CALM meditation evy (www.calm.com)  Headspace  Don't let anxiety run your life. Using the science of emotion regulation and mindfulness to overcome fear and worry by Victor Manuel Khan PsyD and Tyler Villar MA.  The Flat World Education Podcast (September 27, 2023): 6 Magic Words That Stop Anxiety  What Happened to You?- a look at the impact trauma has on behavior written by Sheyla Macedo and Dr. Niels Patel  Whole Again by Dong Templeton - discovering your true self after trauma    Mindful Eating/The Hungry Brain  Am I Hungry? Mindful eating virtual  evy (www.amihungry.com)  The Hungry Brain by Tara Guadalupe, PhD  Mindless Eating by Marques Jones  Weight Loss Surgery Will Not Treat Food Addiction by Ifeoma Quezada Ph.D    Metabolic Dysfunction, Hormones and Cravings  Why We Get Sick? By Mello Newman (insulin resistance)  Your Body in Balance: The New Science of Food, Hormones, and Health by Dr. Yfn Romero  The Complete Guide to fasting by Dr. Man  Fast Like a Girl by Dr. Maddy Bruno  The M Factor (documentary on PBS about Menopause)  Sugar, Salt & Fat by Sonja Gramajo, Ph.D, R.D.  The Truth About Sugar - documentary on sugar (Free on [employer], https://youtu.be/8V0rltpHB4q)  Presentation on SUGAR called Sugar: The Bitter Truth by Dr. Sanjay Kuo ([employer]) https://youtu.be/dBnniua6-oM?[email or web address]  Reverse Visceral Fat: #1 Way to Increase Your Lifespan & End Inflammation with Dr. Ren Sin on Utube @ https://youtu.be/nupPRnvUpJY?si=[email or web address]    Nutrition Support  You Are What You Eat - Netfix series on twin study looking at impact of nutrition changes on health  The End of Dieting: How to Live for Life by Dr. Mathis  NICHOLE Hall. or listen to The PeopleGoal Podcast Episode 63: Understanding \"Nutritarian\" Eating w/Dr. Del Hall  The Game Changers- Netflix Documentary on plant based nutrition  The Dr. Julian T5 Wellness Plan by Dr. Scotty Julian MD  The Complete Guide to fasting by Dr. Man  @[facility] ([facility] Dietician with support surrounding nutrition and meal prep/planning)    Education, Motivation and Support Resources  Live to 100: Secrets of the Blue Zones - Netflix series on the secrets to communities living over 100 years old  Atomic Habits by Jan Nunez (a book about taking small steps to promote greater behavior change)   Motivation evy (black box with white \")- daily supportive messages sent to your phone  Can't Hurt Me by Victor Manuel Gutierrez (a book exploring the power of discipline in achieving your goals)  Fed Up - documentary about obesity (Free on Utube)  Www.yourweightmatters.org - Obesity Action Coalition sponsored Blog posts  Obesity Action Coalition Resources on topics specific to weight management (www.obesityaction.org)  Fitlosophy Fitspiration - journal to better health (journal book found at [employer] in fitness aisle)  Prashant Easton talk titled: The Call to Courage (Netflix)  The Exam Room by the Physician's Committee (Podcast)  Nutrition Facts by Dr. Guerrier (Podcast)    Perimenopause/Menopause and Obesity    The prevalence of obesity, which is closely associated with cardiovascular risk, increases significantly in American women after they reach age 40; the prevalence reaches 65% between 40 and 59 years, and 73.8% in women over age 60.    According to the Healthy Women Study, the average weight gain in perimenopausal women was about five pounds; however, 20% of the population they studied gained 10 pounds or more. Not only is the weight increase from a drop in estrogen but it’s also because of a decrease in energy expenditure. Some women may notice an overall weight gain, whereas others may not see a  difference on the scale but may notice that their pants aren’t buttoning as easily. Both are surprising to many women because they may not notice a difference in their dietary intake or activity.    The reasons for increasing obesity in menopausal women are not clear. Some researchers argue that the absence of estrogens may be an important obesity-triggering factor. Estrogen deficiency enhances metabolic dysfunction predisposing to type 2 diabetes mellitus, the metabolic syndrome, and cardiovascular diseases. Estrogen plays a vital role in fat storage and distribution. Before perimenopause, estrogen deposits fat in your thighs, hips, and buttocks. During and after menopause, the drop in estrogen leads to an overall increase in total body fat but now more so in your midsection. Studies have consistently shown that this waistline increase is different from when you were younger. An increase in visceral (abdominal) fat is linked to an increase in insulin resistance, diabetes, and inflammatory diseases.    Another factor contributing to weight gain in perimenopause may be the increased appetite and calorie intake that occurs in response to hormone changes. In one study, levels of the “hunger hormone” ghrelin were found to be significantly higher in perimenopausal women compared with premenopausal and postmenopausal women.    The low estrogen levels in the late stages of menopause may also impair the function of leptin and neuropeptide Y, hormones that control fullness and appetite. Therefore, women in the late stages of perimenopause who have low estrogen levels may be driven to eat more calories and store fat.    Make weight gain a modifiable risk factor  So, you may be thinking--I’m destined for failure! But this isn’t true. Although the risk of weight gain as a middle-aged woman is higher, this does not mean that it is required. It does mean that we may have to work a little harder to prevent this from happening. It  is important to keep in mind that many of the health risks found in the menopause transition are also affected by weight. If we are able to keep a healthy weight, or at least minimize any weight gain, then we are likely to minimize these additional health risks. Now that you know the risks, here are some ways to stay healthy during this midlife transition and avoid a midlife crisis:    Reduce calories. During menopause, our energy expenditure decreases even if our activity level and nutrient intake stays the same. This is secondary to the hormone changes with menopause as well as the natural muscle loss that is occurring. We need about 200 fewer calories in our 50s than we did in our 30s and 40s. This means that we’ve got to move more and eat less to keep our healthy weight. To help decrease portion sizes, try splitting your meals with a friend, ordering the lighter portion when available, or put half in the takeout box right away. Swap out dessert for fruit or yogurt.   Reduce carbs. Cut back on carbs in order to reduce the increase in belly fat, which drives metabolic problems   Add fiber. Eat a high-fiber diet that includes flaxseeds, which may improve insulin sensitivity.   Work out. Engage in strength training to improve body composition, increase strength, and build and maintain lean muscle. The American Heart Association recommends 150 minutes of moderate exercise per week. Add ANY activity to your day. Strength and resistance training help maintain bone mass. This will help to prevent osteoporosis, which is bone loss that can lead to easy fractures.   Rest and relax. Try to relax before bed and get enough sleep in order to keep your hormones and appetite under control.   Get support and learn to cope without food. Many women (and men) admit to eating under stress. And, let’s face it, middle age can bring some tough times. Children are often departing from the home, and some are returning. Your parents now  need more help and guidance. This can be disruptive to our everyday lives. Focus on using nonfood stress relievers. Try going for a walk, deep breathing, or scheduling some “me” time with your favorite book to unwind. Seek out support from friends and loved ones who may have gone through a similar situation.    Taken from The North American Menopause Society  Alesha Mckeon MD, [facility], [facility]      What are proteins?  Proteins are one of three primary macronutrients that provide energy to the human body, along with fats and carbohydrates. Proteins are also responsible for a large portion of the work that is done in cells; they are necessary for proper structure and function of tissues and organs, and also act to regulate them. They are comprised of a number of amino acids that are essential to proper body function, and serve as the building blocks of body tissue.  There are 20 different amino acids in total, and the sequence of amino acids determines a protein's structure and function. While some amino acids can be synthesized in the body, there are 9 amino acids that humans can only obtain from dietary sources (insufficient amounts of which may sometimes result in death), termed essential amino acids. Foods that provide all of the essential amino acids are called complete protein sources, and include both animal (meat, dairy, eggs, fish) as well as plant-based sources (soy, quinoa, buckwheat).    Proteins can be categorized based on the function they provide to the body. Below is a list of some types of proteins:  Antibody--proteins that protect the body from foreign particles, such as viruses and bacteria, by binding to them  Enzyme--proteins that help form new molecules as well as perform the many chemical reactions that occur throughout the body  Messenger--proteins that transmit signals throughout the body to maintain body processes  Structural component--proteins that act as building blocks for cells  that ultimately allow the body to move  Transport/storage--proteins that move molecules throughout the body  As can be seen, proteins have many important roles throughout the body, and as such, it is important to provide sufficient nutrition to the body to maintain healthy protein levels.    How much protein do I need?  The amount of protein that the human body requires daily is dependent on many conditions, including overall energy intake, growth of the individual, and physical activity level. It is often estimated based on body weight, as a percentage of total caloric intake (10-35%), or based on age alone. 0.8g/kg of body weight is a commonly cited recommended dietary allowance (RDA). This value is the minimum recommended value to maintain basic nutritional requirements, but consuming more protein, up to a certain point, maybe beneficial, depending on the sources of the protein.  The recommended range of protein intake is between 0.8 g/kg and 1.8 g/kg of body weight, dependent on the many factors listed above. People who are highly active, or who wish to build more muscle should generally consume more protein. Some sources suggest consuming between 1.8 to 2 g/kg for those who are highly active. The amount of protein a person should consume, to date, is not an exact science, and each individual should consult a specialist, be it a dietitian, doctor, or , to help determine their individual needs. I recommend your daily protein intake to be 100 grams/day.    Foods high in protein  There are many different combinations of food that a person can eat to meet their protein intake requirements. For many people, a large portion of protein intake comes from meat and dairy, though it is possible to get enough protein while meeting certain dietary restrictions you might have. Generally, it is easier to meet your RDA of protein by consuming meat and dairy, but an excess of either can have a negative health  impact. There are plenty of plant-based protein options, but they generally contain less protein in a given serving. Ideally, a person should consume a mixture of meat, dairy, and plant-based foods in order to meet their RDA and have a balanced diet replete with nutrients.    If possible, consuming a variety of complete proteins is recommended. A complete protein is a protein that contains a good amount of each of the nine essential amino acids required in the human diet. Examples of complete protein foods or meals include:    Meat/Dairy examples  Eggs  Chicken breast  Cottage cheese  Greek yogurt  Milk  Lean beef  Tuna  Turkey breast  Fish  Shrimp    Vegan/plant-based examples  Buckwheat  Hummus and phi  Soy products (tofu, tempeh, edamame beans)  Peanut butter on toast or some other bread  Beans and rice  Quinoa  Hemp and yrn seeds  Spirulina  Generally, meat, poultry, fish, eggs, and dairy products are complete protein sources. Nuts and seeds, legumes, grains, and vegetables, among other things, are usually incomplete proteins. There is nothing wrong with incomplete proteins however, and there are many healthy, high protein foods that are incomplete proteins. As long as you consume a sufficient variety of incomplete proteins to get all the required amino acids, it is not necessary to specifically eat complete protein foods. In fact, certain high fat red meats for example, a common source of complete proteins, can be unhealthy.   Below are some examples of high protein foods that are not complete proteins:  Almonds  Oats   Broccoli  Lentils  Akin bread  Yrn seeds  Pumpkin seeds  Peanuts  [place] sprouts  Grapefruit  Green peas  Avocados  Mushrooms  As can be seen, there are many different foods a person can consume to meet their RDA of protein. The examples provided above do not constitute an exhaustive list of high protein or complete protein foods. As with everything else, balance is important, and the  examples provided above are an attempt at providing a list of healthier protein options (when consumed in moderation).    Amount of protein in common food      Protein Amount  Milk (1 cup/8 oz)  8 g  Egg (1 large/50 g)  6 g  Meat (1 slice / 2 oz)  14 g  Seafood (2 oz)  16 g  Bread (1 slice/64 g)   8 g  Corn (1 cup/166 g)  16 g  Rice (1 cup/195 g)  5 g  Dry Bean (1 cup/92 g)   16 g  Nuts (1 cup/92 g)    20 g  Fruits and Veggie (1 cup)  1 g    Data above taken from www.calculator.net    The Power of Protein:    High Protein Foods:  FISH  (3-6 ounces/meal)  All types of fish  Seafood (shrimp, scallops, clams, mussels, lobster)  EGGS     2-3 eggs/meal  DAIRY (2/3 to 1 ½ cup)  Cottage cheese   Greek yogurt  PLANTS (½-3/4 cup/meal)  Legumes: Dried beans and peas (black beans, forbes beans, garbanzo beans, kidney, cannellini, navy, split peas, black eyed peas)  Lentils  Quinoa  Soy (edamame, tofu)  PORK   (3-6 oz/meal)  Tenderloin  Pork chop  Top loin roast, boneless  Sirloin roast, boneless  Kent tejada (nitrate free)  Boiled deli ham (nitrate free)    Additional Protein Sources:  BEEF    (3-6 oz/meal)  Flank steak       Skirt steak  Bottom round(rump roast), select    Ground beef, 90% lean (ground sirloin)  Aniceto eye steak, choice  Eye of round roast, choice   POULTRY  (3-6 oz/meal)  Ground chicken or turkey  Chicken, no skin  Turkey, no skin  PROTEIN SHAKES: OWYN, Koia, and Rebbl (plant based and dairy free), Corepower by Fairlife, Orgain (plant based option available), Premier Protein, JuicePlus Complete (www.juiceplus.com)  PROTEIN BARS: RXBAR, PowerCrunch, Quest, Barebells, Mosh  SNACK/ON-the-GO OPTIONS: Chomps Meatstick, Oats Overnight (www.oatsovernight.NellOne Therapeutics), Jenks brand protein granola      Comfort Foods - Why do they make us happy?    by Kristy Bell, PhD, HSPP    OAC @ www.obesityaction.org Summer 2013 Resources    “There, there. Just let me bake you some cookies.” “Don’t cry. We’ll stop and get some ice  cream, and it will all be better.”    Do these sound familiar? Maybe statements that you heard as a young child? They were innocent words and very genuine actions on the part of our caregivers to express love and concern to us when we were hurting. The way they knew to do this was through food - and not just any food. Usually, the foods offered were foods rich in fats and carbohydrates - the foods that we have come to term “comfort foods.” In this way, food has come to be used as a special type of medicine, as an anti-depressant of types, to cure the mood that ails us. However, such patterns can become very problematic, especially if it is a habitual pattern causing excessive weight gain.    Scientists continue to research the effect that the chemical composition of foods may have on our moods. While such research is very valuable, I want to focus on the psychology, not the biology, of comfort food.    Food and Customs  All cultures have customs around food. In my childhood, money was scarce and when there was some kind of special occasion, it meant that we would use limited resources to buy special foods. It meant that we were being treated in a special way. Birthdays meant choosing a special meal and a type of cake and ice cream (within a budget). Funerals involved having food brought to the bereaved. The  ritual is one of special interest to this topic. The message is quite obvious: “I hope this food makes you happier.” Again, nothing is ill-intended and the gift is given with much love and care. However, it is another reinforcement of the use of food to make us “feel better.”    We are given messages early in our lives and then reinforced throughout our lives about how food can make us feel different, to feel better. Because we equate food with happiness, we continue to turn to food for such comfort. And we do feel happy or better, albeit temporarily.    Changing Patterns  1. The key to changing this  lifelong pattern of equating food with happiness is to first be aware. Take some time to reflect on how food was used through your life and its connection to emotional states for you.    2. Next, take some time to reflect on your own emotional states. You may keep a feeling journal and write down how you felt each day. In reflecting, you will be more aware of the connection of food to your feelings in the past and more aware of your feelings in the present.    3. Then, the work begins. Take each emotion connected to food and create a list of other things you may do to tend to that emotion. For instance, you may have “sadness” as one emotion that has been connected to eating. Alternative ways to get comfort when sad may be:    Talk to a friend  Cry  Journal your feelings  Listen to music  Write a song or a poem    4. By creating alternatives, you begin to see how you can break the cycle of comfort eating.    5. Post this list of alternatives in a place that you are likely to see it regularly. Consult it. Add to it as needed. Or blanca things off that you have tried that maybe didn’t work.    Food and Behavioral Conditioning  One important means of understanding the connection between food and behavior is understanding how we are conditioned to have a certain response when we are exposed repeatedly to a stimuli. In this case, when we have been told repeatedly that we can feel better with food (the stimuli), we believe that we do indeed feel better (the response) when we eat cakes and cookies and such. However, what we don’t think about is the other stimulus, the care, the concern, the love that came with the food and how that made us feel. In other words, we may have attributed our response (feeling better) to the wrong stimulus (food) rather than the one that actually did make us feel better, which was the love we felt.    So maybe it is not the food that makes us feel happy. Maybe it is the memory of these people  expressing their love and care to us. Maybe that is what really makes us happy. Patients often tell me that they eat when sad, lonely or bored. They are seeking comfort.    They want to feel “full” or “satisfied” and the food does offer that physical release. However, the true comfort that they seek cannot be found in carbohydrates or fat, but it can be found in the feeling of belonging, of connecting with others, of being creative and inspired.    Conclusion  Remember, you have had a lifetime of creating a pattern of using comfort foods, so it is not likely to change quickly. Make sure to give yourself some time to make these changes. When you are able to change the relationship with food, you are able to change your relationships with others, and you just might find more satisfying and healthier relationships.

## 2025-05-16 ENCOUNTER — OFFICE VISIT (OUTPATIENT)
Dept: INTERNAL MEDICINE CLINIC | Facility: CLINIC | Age: 50
End: 2025-05-16
Payer: COMMERCIAL

## 2025-05-16 VITALS — BODY MASS INDEX: 30.52 KG/M2 | HEIGHT: 66.5 IN | WEIGHT: 192.19 LBS

## 2025-05-16 DIAGNOSIS — Z86.39 HISTORY OF OBESITY: ICD-10-CM

## 2025-05-16 DIAGNOSIS — E66.3 OVERWEIGHT (BMI 25.0-29.9): Primary | ICD-10-CM

## 2025-05-16 PROCEDURE — 3008F BODY MASS INDEX DOCD: CPT

## 2025-05-16 PROCEDURE — 97802 MEDICAL NUTRITION INDIV IN: CPT

## 2025-05-16 NOTE — PATIENT INSTRUCTIONS
Goals:     1.  Keep a food record, My Net Diary, select the macros dashboard.  2.  Strive to consume at least 4-6 meals/snacks per day.  Include protein and produce when you eat.  Aim  for  grams of protein per day.  Try to keep the carbohydrates at 100-120 grams per day or less.    3.  Practice some mindful eating strategies, chew food 20-30 times before swallowing, eat food slowly, use smaller plates/bowls/utensils.  Make the meals last 30 minutes.  4.  Aim for 64 oz per day of water.  (Try Protein 2 O water, adding True Lemon, Crystal Light, use a measured container).  5.  Taper caffeine and carbonation.  6.  Exercise with a goal of 30 minutes per day for exercise (for example,walking). (I burn 6.4 calories per minute of walking),  7.  Strength training 10 minutes 3 days per week.  (7 minute workout song on You Tube) or (Gym Rat no more-18 at home exercises)

## 2025-05-16 NOTE — PROGRESS NOTES
INITIAL OUTPATIENT NUTRITION CONSULTATION        Nutrition Assessment    Medical Diagnosis: Overweight    Physical Findings: fatigue, joint pain, and gets 4-5 hours of sleep per night due to inflammatory pain in shoulders    Client Age and Gender: 49 year old female    Marital Status and Occupation: , lives with 3 children.  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      Labs:   No components found for: \"HGBA1C\"  TRIGLYCERIDES   Date Value Ref Range Status   06/10/2014 51 <150 mg/dL Final     Triglycerides   Date Value Ref Range Status   10/16/2023 33 30 - 149 mg/dL Final     Comment:     Reference interval for fasting triglycerides  Desirable: <150 mg/dL  Borderline: 150-199 mg/dL  High: 200-499 mg/dL  Very High: >=500 mg/dL           LDL Cholesterol   Date Value Ref Range Status   10/16/2023 68 <100 mg/dL Final     Comment:     Desirable <100 mg/dL   Borderline 100-129 mg/dL   High     >=130mg/dL         LDL-CHOLESTEROL   Date Value Ref Range Status   06/10/2014 74 <130 mg/dL (calc) Final     Comment:        Desirable range <100 mg/dL for patients with CHD or  diabetes and <70 mg/dL for diabetic patients with  known heart disease.          HDL Cholesterol   Date Value Ref Range Status   10/16/2023 74 (H) 40 - 59 mg/dL Final     Comment:     Interpretive Information:   An HDL cholesterol <40 mg/dL is low and constitutes a coronary heart disease risk factor. An HDL cholesterol >60 mg/dL is a negative risk factor for coronary heart disease.         HDL CHOLESTEROL   Date Value Ref Range Status   06/10/2014 48 > OR = 46 mg/dL Final     AST   Date Value Ref Range Status   04/19/2024 27 15 - 37 U/L Final   06/10/2014 13 10 - 30 U/L Final     ALT   Date Value Ref Range Status   04/19/2024 75 (H) 13 - 56 U/L Final   06/10/2014 19 6 - 29 U/L Final         Height:  Ht Readings from Last 1 Encounters:   05/08/25 5' 6.5\" (1.689 m)       Weight:   Wt Readings from Last 2 Encounters:    05/08/25 188 lb   09/25/24 175 lb       BMI Readings from Last 1 Encounters:   05/08/25 29.89 kg/m²       Weight change: Increase of 4.2 lbs in the past week    Goal weight/Health status:150 lbs    Diet/Weight History: Per pt with excess weight beginning last year was 157 lbs, was increasing over time, was trying hard to lose the excess weight. \" The more I think about losing,  I gain\".  Per pt has tried low carb diet and enjoys salads but gets bored with them, has tried healthy eating, dislikes fried foods.    Current Diet: calorie reduction    Food/Beverage Intake:     Breakfast: 4 eggs with butter scrambled  Snack: greek yogurt 1 cup and walnuts honey and flax seed grd vanilla and sometimes fruit  Lunch: 1 pm- salad sometimes with meat-turkey on spring mix salad with cucumber with vinegrette or tuna on mixed greens   Snack:4 pm-  chips handful  Dinner: 4-6 pm- Torres salad   Snack: skips  Beverages: water 24 oz/day and regular black coffee 16 oz with half and half  Calories:500-1700 calories per day  Protein:69 gm/day  Carbs:113 gm/day    Vitamins/mineral supplements:mvi , iron    Meal pattern: 3 meals/d, 2 snacks/d    Number of meals/week eaten at restaurants: 1-2 x/week        Alcohol Intake: no ozs/wk    Estimated current caloric intake: 1880 cals/d    Estimated caloric needs for weight loss: 1880-842=3597 cals/d for 1 pounds/week weight loss    Physical Activity: [employer] membership cycle on Tuesdays, Monday shred class, Wednesday arms and Thursday is core, Friday is HIT (180-240 minutes/week) (4-12K steps per day)    Food Journal: yes, MFP for past month    Spent 65 minutes in consultation with the patient.      Nutrition Intervention/Education:  Comprehensive nutrition education and evaluation provided for weight loss. Met with pt for initial visit. Pt is being followed by [facility] for medical weight management.  Pt is not yet taking any medication for weight loss.  Per pt with excess weight  beginning last year was 157 lbs, was increasing over time, was trying hard to lose the excess weight. \" The more I think about losing,  I gain\".  Per pt has tried low carb diet and enjoys salads but gets bored with them, has tried healthy eating, dislikes fried foods..  Per diet recall, suspect current diet is low in protein and higher in carbs.   Discussed benefits of including protein and produce with meals and snacks.  Provided guidance, ideas and recipe for including protein with produce in diet.  Pt verbalized understanding.  Per pt is drinking ~ 24 oz of water per day. Discussed ideas for increasing hydration in diet.  Pt verbalized understanding.  Per pt is exercising regularly.  Discussed ideas to increase exercise in small increments.  Pt verbalized understanding.  Patient agreed to goals below.    Goals:     1.  Keep a food record, My Net Diary, select the macros dashboard.  2.  Strive to consume at least 4-6 meals/snacks per day.  Include protein and produce when you eat.  Aim  for  grams of protein per day.  Try to keep the carbohydrates at 100-120 grams per day or less.    3.  Practice some mindful eating strategies, chew food 20-30 times before swallowing, eat food slowly, use smaller plates/bowls/utensils.  Make the meals last 30 minutes.  4.  Aim for 64 oz per day of water.  (Try Protein 2 O water, adding True Lemon, Crystal Light, use a measured container).  5.  Taper caffeine and carbonation.  6.  Exercise with a goal of 30 minutes per day for exercise (for example,walking). (I burn 6.4 calories per minute of walking),  7.  Strength training 10 minutes 3 days per week.  (7 minute workout song on You Tube) or (Gym Rat no more-18 at home exercises)     Monitoring/Evaluation:  Follow up appt scheduled with dietitian          Kathrin Sumner RD, LDN

## 2025-05-30 ENCOUNTER — LAB ENCOUNTER (OUTPATIENT)
Dept: LAB | Age: 50
End: 2025-05-30
Attending: NURSE PRACTITIONER
Payer: COMMERCIAL

## 2025-05-30 DIAGNOSIS — R63.5 WEIGHT GAIN: ICD-10-CM

## 2025-05-30 DIAGNOSIS — Z51.81 ENCOUNTER FOR THERAPEUTIC DRUG MONITORING: ICD-10-CM

## 2025-05-30 DIAGNOSIS — E66.3 OVERWEIGHT (BMI 25.0-29.9): ICD-10-CM

## 2025-05-30 DIAGNOSIS — Z86.39 HISTORY OF OBESITY: ICD-10-CM

## 2025-05-30 LAB
ALBUMIN SERPL-MCNC: 4.8 G/DL (ref 3.2–4.8)
ALBUMIN/GLOB SERPL: 1.8 {RATIO} (ref 1–2)
ALP LIVER SERPL-CCNC: 77 U/L (ref 39–100)
ALT SERPL-CCNC: 37 U/L (ref 10–49)
ANION GAP SERPL CALC-SCNC: 10 MMOL/L (ref 0–18)
AST SERPL-CCNC: 29 U/L (ref ?–34)
BASOPHILS # BLD AUTO: 0.06 X10(3) UL (ref 0–0.2)
BASOPHILS NFR BLD AUTO: 1.2 %
BILIRUB SERPL-MCNC: 1.2 MG/DL (ref 0.3–1.2)
BUN BLD-MCNC: 22 MG/DL (ref 9–23)
CALCIUM BLD-MCNC: 10.1 MG/DL (ref 8.7–10.6)
CHLORIDE SERPL-SCNC: 105 MMOL/L (ref 98–112)
CHOLEST SERPL-MCNC: 171 MG/DL (ref ?–200)
CO2 SERPL-SCNC: 27 MMOL/L (ref 21–32)
CREAT BLD-MCNC: 1.11 MG/DL (ref 0.55–1.02)
EGFRCR SERPLBLD CKD-EPI 2021: 61 ML/MIN/1.73M2 (ref 60–?)
EOSINOPHIL # BLD AUTO: 0.14 X10(3) UL (ref 0–0.7)
EOSINOPHIL NFR BLD AUTO: 2.9 %
ERYTHROCYTE [DISTWIDTH] IN BLOOD BY AUTOMATED COUNT: 13.5 %
EST. AVERAGE GLUCOSE BLD GHB EST-MCNC: 123 MG/DL (ref 68–126)
FASTING PATIENT LIPID ANSWER: YES
FASTING STATUS PATIENT QL REPORTED: YES
GLOBULIN PLAS-MCNC: 2.6 G/DL (ref 2–3.5)
GLUCOSE BLD-MCNC: 80 MG/DL (ref 70–99)
HBA1C MFR BLD: 5.9 % (ref ?–5.7)
HCT VFR BLD AUTO: 43.2 % (ref 35–48)
HDLC SERPL-MCNC: 60 MG/DL (ref 40–59)
HGB BLD-MCNC: 13.8 G/DL (ref 12–16)
IMM GRANULOCYTES # BLD AUTO: 0.01 X10(3) UL (ref 0–1)
IMM GRANULOCYTES NFR BLD: 0.2 %
LDLC SERPL CALC-MCNC: 100 MG/DL (ref ?–100)
LYMPHOCYTES # BLD AUTO: 1.46 X10(3) UL (ref 1–4)
LYMPHOCYTES NFR BLD AUTO: 30.4 %
MCH RBC QN AUTO: 26.5 PG (ref 26–34)
MCHC RBC AUTO-ENTMCNC: 31.9 G/DL (ref 31–37)
MCV RBC AUTO: 83.1 FL (ref 80–100)
MONOCYTES # BLD AUTO: 0.36 X10(3) UL (ref 0.1–1)
MONOCYTES NFR BLD AUTO: 7.5 %
NEUTROPHILS # BLD AUTO: 2.78 X10 (3) UL (ref 1.5–7.7)
NEUTROPHILS # BLD AUTO: 2.78 X10(3) UL (ref 1.5–7.7)
NEUTROPHILS NFR BLD AUTO: 57.8 %
NONHDLC SERPL-MCNC: 111 MG/DL (ref ?–130)
OSMOLALITY SERPL CALC.SUM OF ELEC: 296 MOSM/KG (ref 275–295)
PLATELET # BLD AUTO: 200 10(3)UL (ref 150–450)
POTASSIUM SERPL-SCNC: 4.1 MMOL/L (ref 3.5–5.1)
PROT SERPL-MCNC: 7.4 G/DL (ref 5.7–8.2)
RBC # BLD AUTO: 5.2 X10(6)UL (ref 3.8–5.3)
SODIUM SERPL-SCNC: 142 MMOL/L (ref 136–145)
T4 FREE SERPL-MCNC: 1.2 NG/DL (ref 0.8–1.7)
TRIGL SERPL-MCNC: 53 MG/DL (ref 30–149)
TSI SER-ACNC: 0.77 UIU/ML (ref 0.55–4.78)
VIT B12 SERPL-MCNC: >2000 PG/ML (ref 211–911)
VIT D+METAB SERPL-MCNC: 30.7 NG/ML (ref 30–100)
VLDLC SERPL CALC-MCNC: 9 MG/DL (ref 0–30)
WBC # BLD AUTO: 4.8 X10(3) UL (ref 4–11)

## 2025-05-30 PROCEDURE — 85025 COMPLETE CBC W/AUTO DIFF WBC: CPT

## 2025-05-30 PROCEDURE — 82607 VITAMIN B-12: CPT

## 2025-05-30 PROCEDURE — 84443 ASSAY THYROID STIM HORMONE: CPT

## 2025-05-30 PROCEDURE — 84439 ASSAY OF FREE THYROXINE: CPT

## 2025-05-30 PROCEDURE — 80053 COMPREHEN METABOLIC PANEL: CPT

## 2025-05-30 PROCEDURE — 83036 HEMOGLOBIN GLYCOSYLATED A1C: CPT

## 2025-05-30 PROCEDURE — 80061 LIPID PANEL: CPT

## 2025-05-30 PROCEDURE — 36415 COLL VENOUS BLD VENIPUNCTURE: CPT

## 2025-05-30 PROCEDURE — 82306 VITAMIN D 25 HYDROXY: CPT

## 2025-08-15 ENCOUNTER — OFFICE VISIT (OUTPATIENT)
Dept: INTERNAL MEDICINE CLINIC | Facility: CLINIC | Age: 50
End: 2025-08-15

## 2025-08-15 VITALS — HEIGHT: 66.5 IN | WEIGHT: 180.63 LBS | BODY MASS INDEX: 28.69 KG/M2

## 2025-08-15 DIAGNOSIS — E66.3 OVERWEIGHT (BMI 25.0-29.9): Primary | ICD-10-CM

## 2025-08-19 DIAGNOSIS — E66.3 OVERWEIGHT (BMI 25.0-29.9): ICD-10-CM

## 2025-08-19 DIAGNOSIS — Z86.39 HISTORY OF OBESITY: ICD-10-CM

## 2025-08-19 DIAGNOSIS — Z51.81 ENCOUNTER FOR THERAPEUTIC DRUG MONITORING: ICD-10-CM

## 2025-08-19 DIAGNOSIS — R63.5 WEIGHT GAIN: ICD-10-CM

## 2025-08-21 RX ORDER — TIRZEPATIDE 5 MG/.5ML
5 INJECTION, SOLUTION SUBCUTANEOUS WEEKLY
Qty: 2 ML | Refills: 3 | OUTPATIENT
Start: 2025-08-21

## 2025-08-25 ENCOUNTER — PATIENT MESSAGE (OUTPATIENT)
Dept: INTERNAL MEDICINE CLINIC | Facility: CLINIC | Age: 50
End: 2025-08-25

## (undated) DEVICE — GLOVE SURG SENSICARE SZ 7

## (undated) DEVICE — GLOVE SURG SENSICARE SZ 7-1/2

## (undated) DEVICE — MARKER SKIN 2 TIP

## (undated) DEVICE — NEEDLE SPINAL 22X5 405148

## (undated) DEVICE — BANDAID COVERLET 1X3

## (undated) DEVICE — PAIN TRAY: Brand: MEDLINE INDUSTRIES, INC.

## (undated) DEVICE — REMOVER DURAPREP 3M

## (undated) NOTE — LETTER
To: Dr Rosario  Patient Name: Coleman Gastelum  -Age / Sex: 1975-A: 48 y  female   Medical Records: [id number] [facility]: [id number]    Request for History & Physical for Radiology Procedure at [facility]    The above patient is scheduled to have a procedure performed in Radiology.  In order for the procedure to be performed safely, a comprehensive History & Physical, to include the Review of Systems, is required within 30 days of the scheduled appointment.      Procedure:    Date Scheduled:   Ordered by (Ordering Physician):  Dr Abdalla    Please FAX the completed H&P to [facility] at [phone].  For Questions: Call [facility] [phone]    If you cannot provide us with a comprehensive History & Physical prior to this appointment, you will need to cancel and reschedule the appointment by calling Central Scheduling [phone].  Thank you.

## (undated) NOTE — LETTER
Patient Name: Coleman Gastelum        : 1975       Medical Record #: [id number]    CONSENT FOR PROCEDURES/SEDATION    Date: 2024       Time: 7:20 AM        1. I authorize the performance upon Coleman Gastelum the following:    __Image guided liver biopsy_______________________________________________    2. I authorize Dr. Jordi Madera (and whomever is designated as the doctor’s assistant), to perform the above mentioned procedures.    3. If any unforeseen conditions arise during this procedure calling for additional procedures, operations, or medications (including anesthesia and blood transfusion), I  further request and authorize the doctor to do whatever he/she deems advisable in my interest.    4. I consent to the taking and reproduction of any photographs in the course of this procedure for professional purposes.    5. I consent to the administration of such sedation as may be considered necessary or advisable by the physician responsible for this service, with the exception of  ___None______________________.    6. I have been informed by my doctor of the nature and purpose of this procedure/sedation, possible alternative methods of treatment, risk involved and possible complications.      Signature of Patient:  ___________________________    Signature of person authorized to consent for patient: Relationship to patient:  ___________________________    ___________________    Witness: ____________________     Date: ______________    Provider: ____________________     Date: ______________

## (undated) NOTE — LETTER
Date: 3/12/2019    Patient Name: Eyal Gastelum          To Whom it may concern: This letter has been written at the patient's request. The above patient was seen at the [facility] for treatment of a medical condition.     This pa

## (undated) NOTE — LETTER
Patient Name: Coleman Gastelum  YOB: 1975          MRN :  [id number]  Date:  1/7/2025  Referring Physician:  Charlee Avendaño    Discharge Summary  Pt has attended 6 visits in Physical Therapy.      Diagnosis:   Tendinitis of right rotator cuff (M75.81)  Subacromial impingement of right shoulder (M75.41)  Biceps tendinitis of right upper extremity (M75.21)         Date of Evaluation:    11/4/2024     Next MD visit:   none scheduled  Date of Surgery: n/a   Insurance Primary/Secondary: [employer] [place] / N/A     # Auth Visits: 5  10/16-12/31 , 4 1/1-4/1/2025            Subjective: Shoulders feel much better. Pt reports that she understands modifications with weight and activities of daily living to not aggravate shoulders again.  Modifying exercise program -typically was doing shoulder abduction with 8# and forward planks.  Pt reports that she feels confident to continue HEP independently.     Pain: 0/10 R/L shoulder-currently but increases with activity to 2/10      Objective: Tx flow sheet     CAPITALIZATION = PROGRESS NOTE UPDATES      Observation/Posture: mm tightness visible upper trap, cervical upper trap and levator scapular, L scapula lateral winging-NEUTRAL POSTURE   Palpation:  excessive mm tightness B upper trap, mid trap, rhomboid, cervical paraspinals, tenderness R>L supraspinatus mm/tendon-MINIMAL MM TIGHTNESS AND TENDERNESS    AROM: (* denotes performed with pain)  Shoulder    Flexion: B 170 (was:R 140; L 140)  Abduction: B 170 (was:R 140*; L 150)  ER: B 90 (was:R 65*; L 65*)  IR: B T6 (was:R T10*; L T8*)     Subacromial Pain Syndrome:  Empty Can test: R NEG (WAS:pain)  Velázquez-Willian Impingement Sign: NEG (WAS:R some pain)  Palpable Tenderness Infraspinatus and Supraspinatus: R - (WAS:+)  Rotator Cuff Tears:   Dropping Sign at 90 Deg ABD and 45 deg ER: NEG (WAS: R pain)  IR Lag Sign: B neg (WAS:R pain, L pain)    Strength/MMT: (* denotes performed with pain)  Shoulder Elbow Scapular    Flexion: R 5-/5 (WAS: 4-*/5); L 5-/5 (WAS: 4/5)  Abduction: B 5-/5 (WAS:R 4-*/5)  L 5-/5 (WAS:4/5)  ER: R 5-/5; L 5/5  IR: R 5/5; L 5/5 Flexion: R 5/5; L 5/5  Extension: R 5/5; L 5/5  Supination: R 5/5; L 5/5  Pronation: R 5/5; L 5/5  Rhomboids: B 5-/5 (WAS:R 4-/5,* L 4/5)  Mid trap: B 5-/5 (WAS:R 4-/5*; L 4/5)   Lats: B 5/5 (WAS:R 4-/5*, L 4/5)  Low trap: B 5-/5 (WAS:R 4-/5*; L 4/5)       Assessment: Pt has made significant improvement in physical therapy with increased ROM, strength, and function with reaching and lifting. Pt hasn't returned to full work out level and more careful with rotational and endrange overhead activities, but feels confident to continue HEP on her own.   Manual and tactile cueing for maintenance of correct SH rhythm and instructed pt to use mirror for visual biofeedback when doing overhead activities to ensure correct SH rhythm. Pt education: ergonomics with focus on avoiding subacromial impingement positions. All goals met.  Pt motivated to continue HEP.      Goals:   Goals: (to be met in 10 visits) -  Pt will report improved ability to sleep without waking due to shoulder pain-MET   Pt will improve shoulder flexion AROM to >/=160 degrees to be able to reach into overhead cabinets without pain or restriction -MET  Pt will improve shoulder abduction AROM to >170 degrees to improve ability to don deodorant, don/doff shirts, and wash hair -MET  Pt will increase shoulder AROM ER to 85 to be able to reach and fasten seatbelt -MET  Pt will increase shoulder AROM IR to 70 to be able to reach in back pocket, tuck in shirt, and turn steering wheel without pain-MET  Pt will improve shoulder strength throughout to 5-/5 to improve function with lifting and reaching -MET  Pt will demonstrate increased mid/low trap strength to 5-/5 to promote improved shoulder mechanics and stabilization with lifting and reaching -MET  Pt will be independent and compliant with comprehensive HEP to maintain progress  achieved in PT -MET    Post QuickDASH Outcome Score  Post Score: 15.91 % (1/7/2025  9:53 AM)    13.64 % improvement      Patient/Family/Caregiver was advised of these findings, precautions, and treatment options and has agreed to actively participate in planning and for this course of care.    Thank you for your referral. If you have any questions, please contact me at Dept: [phone].    Sincerely,  Electronically signed by therapist: Savi Stoll PT     Physician's certification required:  No  Please co-sign or sign and return this letter via fax as soon as possible to [phone].   I certify the need for these services furnished under this plan of treatment and while under my care.    X___________________________________________________ Date____________________    Certification From: 1/7/2025  To:4/7/2025 21st Century Cures Act Notice to Patient: Medical documents like this are made available to patients in the interest of transparency. However, be advised this is a medical document and it is intended as [id number] communication between your medical providers. This medical document may contain abbreviations, assessments, medical data, and results or other terms that are unfamiliar. Medical documents are intended to carry relevant information, facts as evident, and the clinical opinion of the practitioner. As such, this medical document may be written in language that appears blunt or direct. You are encouraged to contact your medical provider and/or [facility] if you have any questions about this medical document.

## (undated) NOTE — LETTER
[address] [facility], [address] [facility], [address] [address] [address]  [address] [facility] [address] [facility] [address]            [address]     [place]   1975     [address]       Dear Tali HE

## (undated) NOTE — MR AVS SNAPSHOT
[facility]  [place],  [address]  [address] [place] [id number] [phone]               Thank you for choosing us for your [facility].   We are glad to serve you and happy to provide you with this Imaging:  US PELVIS EV (TRNS ABD AND ENDOVAG) ([id number])    Instructions: To schedule an appointment for your radiology test please call [facility] Sched[facility] at [phone].          Reason for Today's Visit     Physical           M Tips for making healthy food choices  -   Enjoy your food, but eat less. Fully enjoy your food when eating. Don’t eat while distracted and slow down. Avoid over sized portions. Don’t eat while when you’re bored.      EAT THESE FOODS MORE OFTEN: EAT T

## (undated) NOTE — Clinical Note
Thank you for referring Coleman to the [facility]. Consult was completed today via clinic.  I have ordered labs, referred for a nutrition consultation with our dietician.  I counseled on the importance of lifestyle intervention in adjunct with medication and started Zepbound for treatment with follow-up advised in about 4 months.

## (undated) NOTE — Clinical Note
Hi team, are you able to get this patient in for PT. Severe rotator cuff tendinitis that I am giving her an injection for today. Thanks!